# Patient Record
Sex: MALE | Race: WHITE | NOT HISPANIC OR LATINO | Employment: FULL TIME | URBAN - METROPOLITAN AREA
[De-identification: names, ages, dates, MRNs, and addresses within clinical notes are randomized per-mention and may not be internally consistent; named-entity substitution may affect disease eponyms.]

---

## 2017-04-06 ENCOUNTER — ALLSCRIPTS OFFICE VISIT (OUTPATIENT)
Dept: OTHER | Facility: OTHER | Age: 57
End: 2017-04-06

## 2017-04-06 DIAGNOSIS — Z00.00 ENCOUNTER FOR GENERAL ADULT MEDICAL EXAMINATION WITHOUT ABNORMAL FINDINGS: ICD-10-CM

## 2017-04-06 DIAGNOSIS — R53.83 OTHER FATIGUE: ICD-10-CM

## 2017-04-06 DIAGNOSIS — Z12.5 ENCOUNTER FOR SCREENING FOR MALIGNANT NEOPLASM OF PROSTATE: ICD-10-CM

## 2018-01-14 VITALS
DIASTOLIC BLOOD PRESSURE: 80 MMHG | BODY MASS INDEX: 27.99 KG/M2 | OXYGEN SATURATION: 99 % | RESPIRATION RATE: 16 BRPM | HEIGHT: 70 IN | TEMPERATURE: 99.9 F | SYSTOLIC BLOOD PRESSURE: 122 MMHG | WEIGHT: 195.5 LBS | HEART RATE: 100 BPM

## 2018-06-23 ENCOUNTER — TRANSCRIBE ORDERS (OUTPATIENT)
Dept: LAB | Facility: CLINIC | Age: 58
End: 2018-06-23

## 2018-06-26 ENCOUNTER — OFFICE VISIT (OUTPATIENT)
Dept: INTERNAL MEDICINE CLINIC | Facility: CLINIC | Age: 58
End: 2018-06-26
Payer: COMMERCIAL

## 2018-06-26 VITALS
HEART RATE: 79 BPM | BODY MASS INDEX: 27.58 KG/M2 | SYSTOLIC BLOOD PRESSURE: 102 MMHG | TEMPERATURE: 97.9 F | WEIGHT: 197 LBS | OXYGEN SATURATION: 98 % | HEIGHT: 71 IN | DIASTOLIC BLOOD PRESSURE: 70 MMHG

## 2018-06-26 DIAGNOSIS — E78.01 FAMILIAL HYPERCHOLESTEROLEMIA: ICD-10-CM

## 2018-06-26 DIAGNOSIS — R53.83 FATIGUE, UNSPECIFIED TYPE: ICD-10-CM

## 2018-06-26 DIAGNOSIS — Z12.11 COLON CANCER SCREENING: Primary | ICD-10-CM

## 2018-06-26 DIAGNOSIS — Z12.5 PROSTATE CANCER SCREENING: ICD-10-CM

## 2018-06-26 PROBLEM — Z00.00 HEALTHCARE MAINTENANCE: Status: ACTIVE | Noted: 2018-06-26

## 2018-06-26 PROBLEM — R19.09 LEFT GROIN MASS: Status: ACTIVE | Noted: 2018-06-26

## 2018-06-26 PROCEDURE — 3008F BODY MASS INDEX DOCD: CPT | Performed by: INTERNAL MEDICINE

## 2018-06-26 PROCEDURE — 1036F TOBACCO NON-USER: CPT | Performed by: INTERNAL MEDICINE

## 2018-06-26 PROCEDURE — 99212 OFFICE O/P EST SF 10 MIN: CPT | Performed by: INTERNAL MEDICINE

## 2018-06-26 NOTE — ASSESSMENT & PLAN NOTE
Healthcare maintenance  Patient is not up-to-date with any parameters  He has not had cholesterol checked, colon rectal screening, prostate screening and routine labs performed  I did give the patient another slip to have these performed hopefully in the near future and he was told to make an appointment for physical when that is completed  Patient understands and hopefully is agreeable    He states 1 of the limiting factors is his insurance is extremely expensive and he does have a high deductible

## 2018-06-26 NOTE — ASSESSMENT & PLAN NOTE
Left groin mass  Patient is here today for evaluation of swelling to the left groin area  He states he has just noticed this over the past few months  He states the area for is not painful and there is no accident or injury  On evaluation he is found to have some fatty tissue but no solid mass and no evidence of hernia  Patient was reassured that this seems to be benign and that he should call the office if there is any changes  I told the patient he does not need any further workup or evaluation

## 2018-06-26 NOTE — PROGRESS NOTES
Assessment/Plan:      Diagnoses and all orders for this visit:    Colon cancer screening  -     Occult Bloood,Fecal Immunochemical; Future    Familial hypercholesterolemia  -     Comprehensive metabolic panel; Future  -     CBC and differential; Future  -     Lipid panel; Future  -     TSH, 3rd generation with Free T4 reflex; Future    Fatigue, unspecified type  -     Comprehensive metabolic panel; Future  -     CBC and differential; Future  -     Lipid panel; Future  -     TSH, 3rd generation with Free T4 reflex; Future    Prostate cancer screening  -     PSA, Total Screen; Future          Subjective:     Patient ID: Roland Rico is a 65 y o  male  Patient is a 56-year-old male with a history of no major medical problems who is here today for evaluation of the development of the swelling to the left groin area  Patient denies any pain and no tenderness to palpation  He denies any pain with movement or with cough  He has had no accident or injury  Review of Systems   Constitutional: Negative  HENT: Negative  Eyes: Negative  Respiratory: Negative  Cardiovascular: Negative  Gastrointestinal: Negative for abdominal distention, abdominal pain, anal bleeding, blood in stool, constipation, diarrhea, nausea, rectal pain and vomiting  Swelling to the left groin area   Endocrine: Negative  Genitourinary: Negative  Skin: Negative  Allergic/Immunologic: Negative  Neurological: Negative  Hematological: Negative  Psychiatric/Behavioral: Negative  Objective:     Physical Exam   Constitutional: He is oriented to person, place, and time  He appears well-developed and well-nourished  No distress  Pleasant, healthy-appearing slightly anxious 56-year-old male who is awake alert no acute distress   HENT:   Head: Normocephalic and atraumatic     Right Ear: External ear normal    Left Ear: External ear normal    Nose: Nose normal    Mouth/Throat: Oropharynx is clear and moist    Eyes: Conjunctivae and EOM are normal    Neck: Normal range of motion  Neck supple  Cardiovascular: Normal rate, regular rhythm, normal heart sounds and intact distal pulses  Pulmonary/Chest: Effort normal and breath sounds normal    Abdominal: Soft  Bowel sounds are normal  He exhibits no distension and no mass  There is no tenderness  There is no rebound and no guarding  On evaluation today patient has no gross abnormalities evident with inspection to the abdomen  Patient does have some fatty tissue and development in the left lower and right lower abdomen but no tenderness to palpation and no masses were felt  Patient has no evidence of hernia or bulging  Musculoskeletal: Normal range of motion  Neurological: He is alert and oriented to person, place, and time  He has normal reflexes  Skin: Skin is warm and dry  He is not diaphoretic  Erythema: reassured  Psychiatric: His behavior is normal  Judgment and thought content normal    Mildly anxious   Nursing note and vitals reviewed

## 2018-07-17 LAB
ALBUMIN SERPL-MCNC: 4.3 G/DL (ref 3.6–5.1)
ALBUMIN/GLOB SERPL: 2 (CALC) (ref 1–2.5)
ALP SERPL-CCNC: 55 U/L (ref 40–115)
ALT SERPL-CCNC: 20 U/L (ref 9–46)
AST SERPL-CCNC: 18 U/L (ref 10–35)
BASOPHILS # BLD AUTO: 38 CELLS/UL (ref 0–200)
BASOPHILS NFR BLD AUTO: 0.6 %
BILIRUB SERPL-MCNC: 0.3 MG/DL (ref 0.2–1.2)
BUN SERPL-MCNC: 18 MG/DL (ref 7–25)
BUN/CREAT SERPL: NORMAL (CALC) (ref 6–22)
CALCIUM SERPL-MCNC: 9.4 MG/DL (ref 8.6–10.3)
CHLORIDE SERPL-SCNC: 103 MMOL/L (ref 98–110)
CHOLEST SERPL-MCNC: 252 MG/DL
CHOLEST/HDLC SERPL: 5.1 (CALC)
CO2 SERPL-SCNC: 29 MMOL/L (ref 20–31)
CREAT SERPL-MCNC: 0.93 MG/DL (ref 0.7–1.33)
EOSINOPHIL # BLD AUTO: 189 CELLS/UL (ref 15–500)
EOSINOPHIL NFR BLD AUTO: 3 %
ERYTHROCYTE [DISTWIDTH] IN BLOOD BY AUTOMATED COUNT: 12.7 % (ref 11–15)
GLOBULIN SER CALC-MCNC: 2.2 G/DL (CALC) (ref 1.9–3.7)
GLUCOSE SERPL-MCNC: 88 MG/DL (ref 65–99)
HCT VFR BLD AUTO: 43.6 % (ref 38.5–50)
HDLC SERPL-MCNC: 49 MG/DL
HGB BLD-MCNC: 14.4 G/DL (ref 13.2–17.1)
LDLC SERPL CALC-MCNC: 179 MG/DL (CALC)
LYMPHOCYTES # BLD AUTO: 1279 CELLS/UL (ref 850–3900)
LYMPHOCYTES NFR BLD AUTO: 20.3 %
MCH RBC QN AUTO: 29.6 PG (ref 27–33)
MCHC RBC AUTO-ENTMCNC: 33 G/DL (ref 32–36)
MCV RBC AUTO: 89.7 FL (ref 80–100)
MONOCYTES # BLD AUTO: 410 CELLS/UL (ref 200–950)
MONOCYTES NFR BLD AUTO: 6.5 %
NEUTROPHILS # BLD AUTO: 4385 CELLS/UL (ref 1500–7800)
NEUTROPHILS NFR BLD AUTO: 69.6 %
NONHDLC SERPL-MCNC: 203 MG/DL (CALC)
PLATELET # BLD AUTO: 252 THOUSAND/UL (ref 140–400)
PMV BLD REES-ECKER: 10.1 FL (ref 7.5–12.5)
POTASSIUM SERPL-SCNC: 4.2 MMOL/L (ref 3.5–5.3)
PROT SERPL-MCNC: 6.5 G/DL (ref 6.1–8.1)
PSA SERPL-MCNC: 1.5 NG/ML
RBC # BLD AUTO: 4.86 MILLION/UL (ref 4.2–5.8)
SL AMB EGFR AFRICAN AMERICAN: 106 ML/MIN/1.73M2
SL AMB EGFR NON AFRICAN AMERICAN: 91 ML/MIN/1.73M2
SODIUM SERPL-SCNC: 139 MMOL/L (ref 135–146)
TRIGL SERPL-MCNC: 113 MG/DL
TSH SERPL-ACNC: 1.16 MIU/L (ref 0.4–4.5)
WBC # BLD AUTO: 6.3 THOUSAND/UL (ref 3.8–10.8)

## 2018-07-24 ENCOUNTER — TELEPHONE (OUTPATIENT)
Dept: INTERNAL MEDICINE CLINIC | Facility: CLINIC | Age: 58
End: 2018-07-24

## 2018-07-30 DIAGNOSIS — E78.01 FAMILIAL HYPERCHOLESTEROLEMIA: Primary | ICD-10-CM

## 2018-07-30 DIAGNOSIS — R53.83 FATIGUE, UNSPECIFIED TYPE: ICD-10-CM

## 2018-07-30 DIAGNOSIS — G47.30 SLEEP APNEA, UNSPECIFIED TYPE: ICD-10-CM

## 2018-07-30 DIAGNOSIS — R40.0 DAYTIME SLEEPINESS: ICD-10-CM

## 2018-07-30 DIAGNOSIS — R06.83 SNORING: ICD-10-CM

## 2018-07-30 NOTE — PROGRESS NOTES
Discuss the results of recent lab testing with the patient  The only abnormality is his cholesterol profile  Patient admits the fact that he is not watching his diet and he was told he has 6 months but to make some changes with his diet  Most importantly we told him to try to reduce his intake of fats and cholesterol  As an aside patient states he is having problems with fatigue  He states this is variable  He admits the fact that he is having problems with loud snoring and sleeping this a daytime  Patient needs sleep study a performed and we will help to arrange this as an in-home study    Patient is to make an appointment after the sleep study is performed

## 2018-08-14 ENCOUNTER — TELEPHONE (OUTPATIENT)
Dept: SLEEP CENTER | Facility: CLINIC | Age: 58
End: 2018-08-14

## 2018-08-14 NOTE — TELEPHONE ENCOUNTER
----- Message from Odette Godinez MD sent at 8/9/2018  4:03 PM EDT -----  Regarding: RE: sleep study approval  Approved  ----- Message -----  From: Zonia Fraga: 8/9/2018   2:09 PM  To:  Odette Godinez MD  Subject: RE: sleep study approval                         Yes per note patient does snore loudly  ----- Message -----  From: Odette Godinez MD  Sent: 8/9/2018   1:53 PM  To: Jules Lewis  Subject: RE: sleep study approval                         Snoring?  ----- Message -----  From: Jules Lewis  Sent: 8/9/2018  10:01 AM  To: Sleep Medicine THE [facility] [place], #  Subject: sleep study approval                             Please review for approval or denial  Dr Amari Mosquera 7/30 note and order

## 2018-10-12 ENCOUNTER — HOSPITAL ENCOUNTER (OUTPATIENT)
Dept: SLEEP CENTER | Facility: CLINIC | Age: 58
Discharge: HOME | End: 2018-10-12
Payer: COMMERCIAL

## 2018-10-12 DIAGNOSIS — R06.83 SNORING: ICD-10-CM

## 2018-10-12 DIAGNOSIS — G47.30 SLEEP APNEA, UNSPECIFIED TYPE: ICD-10-CM

## 2018-10-12 DIAGNOSIS — R53.83 FATIGUE, UNSPECIFIED TYPE: ICD-10-CM

## 2018-10-12 DIAGNOSIS — R40.0 DAYTIME SLEEPINESS: ICD-10-CM

## 2018-10-12 PROCEDURE — G0399 HOME SLEEP TEST/TYPE 3 PORTA: HCPCS

## 2018-10-13 ENCOUNTER — IMMUNIZATION (OUTPATIENT)
Dept: INTERNAL MEDICINE CLINIC | Facility: CLINIC | Age: 58
End: 2018-10-13
Payer: COMMERCIAL

## 2018-10-13 DIAGNOSIS — Z23 ENCOUNTER FOR IMMUNIZATION: ICD-10-CM

## 2018-10-13 PROCEDURE — 90471 IMMUNIZATION ADMIN: CPT

## 2018-10-13 PROCEDURE — 90682 RIV4 VACC RECOMBINANT DNA IM: CPT

## 2018-10-22 ENCOUNTER — TELEPHONE (OUTPATIENT)
Dept: INTERNAL MEDICINE CLINIC | Facility: CLINIC | Age: 58
End: 2018-10-22

## 2018-10-22 ENCOUNTER — TELEPHONE (OUTPATIENT)
Dept: SLEEP CENTER | Facility: CLINIC | Age: 58
End: 2018-10-22

## 2018-10-22 NOTE — TELEPHONE ENCOUNTER
Patient would like a call back regarding test results  Patient understood you were out but is okay with waiting till you got back

## 2018-10-22 NOTE — TELEPHONE ENCOUNTER
I spoke with pt, advised sleep study did not show any evidence of sleep apnea  Offer CON to discuss with Dr Chakraborty Many  Scheduled 12/5/18 at 830 am in the [place] office

## 2018-12-05 ENCOUNTER — OFFICE VISIT (OUTPATIENT)
Dept: SLEEP CENTER | Facility: CLINIC | Age: 58
End: 2018-12-05
Payer: COMMERCIAL

## 2018-12-05 VITALS
BODY MASS INDEX: 28.7 KG/M2 | HEIGHT: 71 IN | SYSTOLIC BLOOD PRESSURE: 118 MMHG | WEIGHT: 205 LBS | DIASTOLIC BLOOD PRESSURE: 60 MMHG | HEART RATE: 62 BPM

## 2018-12-05 DIAGNOSIS — R06.83 SNORING: Primary | ICD-10-CM

## 2018-12-05 DIAGNOSIS — R09.82 POSTNASAL DRIP: ICD-10-CM

## 2018-12-05 DIAGNOSIS — R45.86 MOOD DISTURBANCE: ICD-10-CM

## 2018-12-05 DIAGNOSIS — E66.3 OVERWEIGHT (BMI 25.0-29.9): ICD-10-CM

## 2018-12-05 DIAGNOSIS — R40.0 DAYTIME SLEEPINESS: ICD-10-CM

## 2018-12-05 DIAGNOSIS — R53.83 FATIGUE, UNSPECIFIED TYPE: ICD-10-CM

## 2018-12-05 PROCEDURE — 99244 OFF/OP CNSLTJ NEW/EST MOD 40: CPT | Performed by: INTERNAL MEDICINE

## 2018-12-05 NOTE — PROGRESS NOTES
Consultation - [facility] 63  Marco  63 y o  male  TQI:7/11/1947  IOI:[id number]    Physician Requesting Consult:[facility],       Reason for Consult : At your kind request I saw this patient for initial sleep evaluation today  A home sleep study was undertaken to evaluate for sleep disordered breathing and   patient is here to review results and further options  The study demonstrated a HERMES (respiratory event index of) 1 4 /hour  Minimum oxygen saturation was 91%  The snore index was 3 6%  PFSH, Problem List, Medications & Allergies were reviewed in EMR  He  has no past medical history on file  He currently has no medications in their medication list       HPI:  Study was undertaken for a complaint of constant fatigue of several years duration that has gotten worse over time  He is tired and drowsy irrespective of sleep  He has been snoring my whole life  Snoring is loud and disturbs his wife to the extent that they have to sleep in separate rooms  Wife has noted at times he appears to be gasping for breath On occasion he has a woken himself with snoring or gasping  Restless Leg Syndrome: reports no suggestive symptoms  Parasomnia activity: no features reported   Sleep Routine: Typical Bedtime: 10:00 p m  Gets OOB:  At 4:30 a m  TIB:6 5 hrs Estimated TST@:  5 hrs  Sleep latency:<  30 minutes Sleep Interruptions: 2-3 x/night because of nocturia and is able to fall back asleep  Awakens: with the aid of an alarm feeling not always refreshed but is tired by around around 3:00 p m  He has Excessive Daytime Sleepiness , feels like napping and naps on weekends when he has the opportunity for 2-3 hours  [place] Sleepiness Scale rated at Total score: 12 /24  Habits: reports that he has never smoked  He has never used smokeless tobacco , reports that he does not drink alcohol ,  reports that he does not use drugs  ,Caffeine use: moderate , Exercise routine: regular until around a month ago because of fatigue         Family History: Negative for sleep disturbance  ROS: reviewed & as attached  Significant for weight has been stable  He has postnasal drip  At times he awakens with palpitations  He reports some feelings of depression and has difficulty with memory and concentration  EXAM:    Vitals /60   Pulse 62   Ht 5' 10 5" (1 791 m)   Wt 93 kg (205 lb)   BMI 29 00 kg/m²     General  Well groomed male, appears stated age, in no apparent distress  Psychiatric  Alert and cooperative  Mental state appears normal  Judgement & Insight  good   Head   Craniofacial anatomy:normal Sinuses: non- tender  TMJ: Normal     Eyes   EOM's intact, conjunctiva/corneas clear         Nasal Airway  is patent Septum:central, Mucous membranes:appear normal     Turbinates:  are normal  There is no rhinorrhea; No PND     Oral   Airway   crowded Tongue:Modified Mallampati class IV (only hard palate visible)  Palate:  redundant soft palateTonsils: no hypertrophy  Teeth: normal       Neck    appears thick; Neck Circumference: 42cm; Supple; no abnormal masses; Thyroid:normal  Trachea:central      Lymph    No Cervical or Submandibular Lymhadenopathy   Heart:    RRR; S1,S2 normal; no gallop; nomurmurs     Lungs   Respiratory Effort:normal  Air entry good bilaterally  No wheezes  No rales   Abdomen   Obese, Soft & non-tender     Extremities   No pedal edema  No clubbing or cyanosis  Skin   Skin is warm and dry; Color& Hydration good; no facial rashes or lesions    Neurologic  Speech is clear and coherent  CNII-XII intact  Rombergs Negative  Muscskeltl    Muscle bulk, tone and power WNL Gait:normal          IMPRESSION: Primary Sleep/Secondary(to Medical or Psych conditions) & comorbidities   1  Snoring     2  Daytime sleepiness     3  Fatigue, unspecified type     4  Postnasal drip     5  Overweight (BMI 25 0-29 9)     6  Mood disturbance        PLAN:   1   I reviewed results of the Sleep study with the patient  2  With respect to above conditions, I counseled on pathophysiology, diagnosis, treatment options, risks and benefits; inter-relationship and effects on symptoms and comorbidities; risks of no treatment; costs and insurance aspects  3  For nasal symptoms I advised regular nasal saline rinse followed by topical nasal steroid if necessary  4  I also advised some weight reduction,allowing sufficient opportunity for sleep and re-commencing his exercise routine  5  If symptoms persist in spite of the above strategies, and in-lab diagnostic study and/or further evaluation for mood disturbance would be warranted  6  Follow-up to be scheduled in 3 months to monitor progress and further options           Sincerely,     Authenticated electronically by Elizabeth Armstrong MD   on 06/88/38   Board Certified Specialist

## 2018-12-05 NOTE — PROGRESS NOTES
Review of Systems      Genitourinary need to urinate more than twice a night   Cardiology none   Gastrointestinal none   Neurology forgetfulness and poor concentration or confusion,    Constitutional fatigue   Integumentary none   Psychiatry depression   Musculoskeletal none   Pulmonary snoring   ENT none   Endocrine frequent urination   Hematological none

## 2019-03-18 ENCOUNTER — OFFICE VISIT (OUTPATIENT)
Dept: SLEEP CENTER | Facility: CLINIC | Age: 59
End: 2019-03-18
Payer: COMMERCIAL

## 2019-03-18 VITALS
DIASTOLIC BLOOD PRESSURE: 80 MMHG | SYSTOLIC BLOOD PRESSURE: 132 MMHG | BODY MASS INDEX: 30.06 KG/M2 | HEART RATE: 70 BPM | WEIGHT: 210 LBS | HEIGHT: 70 IN

## 2019-03-18 DIAGNOSIS — R40.0 DAYTIME SLEEPINESS: ICD-10-CM

## 2019-03-18 DIAGNOSIS — R53.83 FATIGUE, UNSPECIFIED TYPE: ICD-10-CM

## 2019-03-18 DIAGNOSIS — E66.3 OVERWEIGHT (BMI 25.0-29.9): ICD-10-CM

## 2019-03-18 DIAGNOSIS — F51.12 INSUFFICIENT SLEEP SYNDROME: ICD-10-CM

## 2019-03-18 DIAGNOSIS — R09.82 POSTNASAL DRIP: ICD-10-CM

## 2019-03-18 DIAGNOSIS — R45.86 MOOD DISTURBANCE: ICD-10-CM

## 2019-03-18 DIAGNOSIS — R06.83 SNORING: Primary | ICD-10-CM

## 2019-03-18 PROCEDURE — 99214 OFFICE O/P EST MOD 30 MIN: CPT | Performed by: INTERNAL MEDICINE

## 2019-03-18 NOTE — PROGRESS NOTES
Review of Systems      Genitourinary need to urinate more than twice a night   Cardiology none   Gastrointestinal none   Neurology none   Constitutional fatigue   Integumentary none   Psychiatry none   Musculoskeletal none   Pulmonary snoring   ENT none   Endocrine frequent urination   Hematological none

## 2019-03-18 NOTE — PROGRESS NOTES
Follow-Up Note - Sleep Center   Pablo Bolivar  63 y o  male  XUB:[birth year]  CMQ:[id number]    CC: I saw this patient for follow-up in clinic today for Sleep and Medical Problems  PFSH, Problem List, Medications & Allergies were reviewed in EMR  Interval changes: none reported  He  has no past medical history on file  He currently has no medications in their medication list     ROS: reviewed (see attached)  Significant for he continues to report nasal symptoms that persists in spite of regular nasal states saline rinse and topical steroid  He also notes clicking in his ear  In the past few months, he reports less feelings of anxiety and depression  HPI:  He has ongoing snoring that is loud and disturbs his wife  She also notes he appears to be gasping for breath  He is not aware of breathing difficulties during sleep but has frequent interruptions  Sleep Routine: He reports getting 6 hours sleep and attributes this to his work schedule ; he has no difficulty initiating or maintaining sleep   He awakens spontaneously feeling is not always refreshed  He tired as the day progresses and has excessive drowsiness  He rated himself at Total score: 12 /24 on the [place] sleepiness scale  He feels like napping and does so on weekends when he has the opportunity  Habits: reports that he has never smoked  He has never used smokeless tobacco ,  reports that he does not drink alcohol ,  reports that he does not use drugs  , Caffeine use: limited  , Exercise routine: regular    EXAM: /80   Pulse 70   Ht 5' 10" (1 778 m)   Wt 95 3 kg (210 lb)   BMI 30 13 kg/m²     Patient is well groomed; well appearing  Skin/Extrem: warm & dry; col & hydration normal; no edema  Psych: cooperativeand in no distress  Mental state appears normal   CNS: Alert, orientated, clear & coherent speech  H&N: EOMI; NC/AT:no facial pressure marks, no rashes  Neck Circumference: 42 cm     ENMT Mucus membranes appear normal Nasal airway:patent  Oral airway:  crowded  Resp:effort is normal CVS: RRR ABD:truncal obesity MSK:Gait normal     IMPRESSION: Primary Sleep/Secondary(to Medical or Psych conditions) & comorbidities   1  Snoring     2  Daytime sleepiness     3  Insufficient sleep syndrome     4  Postnasal drip     5  Overweight (BMI 25 0-29 9)     6  Fatigue, unspecified type     7  Mood disturbance         PLAN:  1  I reviewed results of the Sleep study with the patient  2  With respect to above conditions, I    counseled on pathophysiology, diagnosis, treatment options, risks and benefits; inter-relationship and effects on symptoms and comorbidities; risks of no treatment; costs and insurance aspects  3  For his Nasal symptoms I advised continuing regular nasal saline rinse followed by topical nasal steroid if necessary  I also advised ENT evaluation  4  The alternate would be positive airway pressure therapy  To justify use, a repeat diagnostic study would be warranted and in view of the negative home sleep study, he will need an in-lab diagnostic study  5  Recommended weight reduction,  positional therapy and allowing sufficient opportunity for sleep  6  He wanted to consider his options: follow up with ENT, the dental appliance together with the other strategies as outlined above or repeat an in-lab diagnostic study  He will call for follow-up as needed      Sincerely,    Authenticated electronically by Norris Pedroza MD on 12/39/51   Board Certified Specialist

## 2019-12-03 ENCOUNTER — TELEPHONE (OUTPATIENT)
Dept: INTERNAL MEDICINE CLINIC | Facility: CLINIC | Age: 59
End: 2019-12-03

## 2019-12-03 NOTE — TELEPHONE ENCOUNTER
30+ years ago he had lower back pain   Mri showed a herniated disc  He had an epidural and everything was fine  The last month or so he has been experiencing the same pain  He decided to call a neuro surgeon to discuss possible surgery  They will not see him until he has an Mri and he needs you to order it  He was referred to Dr Jhonathan Ferrell at [facility] AT THE [facility] but he also was told about Dr Ciara Murphy at [facility]  Do you have a preference? If ok with you, please place order for Mri and I will send it to him

## 2019-12-05 ENCOUNTER — OFFICE VISIT (OUTPATIENT)
Dept: INTERNAL MEDICINE CLINIC | Facility: CLINIC | Age: 59
End: 2019-12-05
Payer: COMMERCIAL

## 2019-12-05 VITALS
OXYGEN SATURATION: 95 % | SYSTOLIC BLOOD PRESSURE: 118 MMHG | TEMPERATURE: 97.8 F | HEART RATE: 77 BPM | HEIGHT: 70 IN | DIASTOLIC BLOOD PRESSURE: 64 MMHG | BODY MASS INDEX: 29.46 KG/M2 | WEIGHT: 205.8 LBS

## 2019-12-05 DIAGNOSIS — Z00.00 HEALTHCARE MAINTENANCE: ICD-10-CM

## 2019-12-05 DIAGNOSIS — M54.50 CHRONIC BILATERAL LOW BACK PAIN WITHOUT SCIATICA: Primary | ICD-10-CM

## 2019-12-05 DIAGNOSIS — G89.29 CHRONIC BILATERAL LOW BACK PAIN WITHOUT SCIATICA: Primary | ICD-10-CM

## 2019-12-05 PROCEDURE — 99214 OFFICE O/P EST MOD 30 MIN: CPT | Performed by: INTERNAL MEDICINE

## 2019-12-05 RX ORDER — METHYLPREDNISOLONE 4 MG/1
TABLET ORAL
Qty: 21 EACH | Refills: 0 | Status: SHIPPED | OUTPATIENT
Start: 2019-12-05 | End: 2019-12-20 | Stop reason: ALTCHOICE

## 2019-12-05 NOTE — PATIENT INSTRUCTIONS
Calorie Counting Diet   WHAT YOU NEED TO KNOW:   What is a calorie counting diet? It is a meal plan based on counting calories each day to reach a healthy body weight  You will need to eat fewer calories if you are trying to lose weight  Weight loss may decrease your risk for certain health problems or improve your health if you have health problems  Some of these health problems include heart disease, high blood pressure, and diabetes  What foods should I avoid? Your dietitian will tell you if you need to avoid certain foods based on your body weight and health condition  You may need to avoid high-fat foods if you are at risk for or have heart disease  You may need to eat fewer foods from the breads and starches food group if you have diabetes  How many calories are in foods? The following is a list of foods and drinks with the approximate number of calories in each  Check the food label to find the exact number of calories  A dietitian can tell you how many calories you should have from each food group each day    · Carbohydrate:      ¨ ½ of a 3-inch bagel, 1 slice of bread, or ½ of a hamburger bun or hot dog bun (80)    ¨ 1 (8-inch) flour tortilla or ½ cup of cooked rice (100)    ¨ 1 (6-inch) corn tortilla (80)    ¨ 1 (6-inch) pancake or 1 cup of bran flakes cereal (110)    ¨ ½ cup of cooked cereal (80)    ¨ ½ cup of cooked pasta (85)    ¨ 1 ounce of pretzels (100)    ¨ 3 cups of air-popped popcorn without butter or oil (80)    · Dairy:      ¨ 1 cup of skim or 1% milk (90)    ¨ 1 cup of 2% milk (120)    ¨ 1 cup of whole milk (160)    ¨ 1 cup of 2% chocolate milk (220)    ¨ 1 ounce of low-fat cheese with 3 grams of fat per ounce (70)    ¨ 1 ounce of cheddar cheese (114)    ¨ ½ cup of 1% fat cottage cheese (80)    ¨ 1 cup of plain or sugar-free, fat-free yogurt (90)    · Protein foods:      ¨ 3 ounces of fish (not breaded or fried) (95)    ¨ 3 ounces of breaded, fried fish (195)    ¨ ¾ cup of tuna canned in water (105)    ¨ 3 ounces of chicken breast without skin (105)    ¨ 1 fried chicken breast with skin (350)    ¨ ¼ cup of fat free egg substitute (40)    ¨ 1 large egg (75)    ¨ 3 ounces of lean beef or pork (165)    ¨ 3 ounces of fried pork chop or ham (185)    ¨ ½ cup of cooked dried beans, such as kidney, salcido, lentils, or navy (115)    ¨ 3 ounces of bologna or lunch meat (225)    ¨ 2 links of breakfast sausage (140)    · Vegetables:      ¨ ½ cup of sliced mushrooms (10)    ¨ 1 cup of salad greens, such as lettuce, spinach, or alma (15)    ¨ ½ cup of steamed asparagus (20)    ¨ ½ cup of cooked summer squash, zucchini squash, or green or wax beans (25)    ¨ 1 cup of broccoli or cauliflower florets, or 1 medium tomato (25)    ¨ 1 large raw carrot or ½ cup of cooked carrots (40)    ¨ ? of a medium cucumber or 1 stalk of celery (5)    ¨ 1 small baked potato (160)    ¨ 1 cup of breaded, fried vegetables (230)    · Fruit:      ¨ 1 (6-inch) banana (55)     ¨ ½ of a 4-inch grapefruit (55)    ¨ 15 grapes (60)    ¨ 1 medium orange or apple (70)    ¨ 1 large peach (65)    ¨ 1 cup of fresh pineapple chunks (75)    ¨ 1 cup of melon cubes (50)    ¨ 1¼ cups of whole strawberries (45)    ¨ ½ cup of fruit canned in juice (55)    ¨ ½ cup of fruit canned in heavy syrup (110)    ¨ ?  cup of raisins (130)    ¨ ½ cup of unsweetened fruit juice (60)    ¨ ½ cup of grape, cranberry, or prune juice (90)    · Fat:      ¨ 10 peanuts or 2 teaspoons of peanut butter (55)    ¨ 2 tablespoons of avocado or 1 tablespoon of regular salad dressing (45)    ¨ 2 slices of bejarano (90)    ¨ 1 teaspoon of oil, such as safflower, canola, corn, or olive oil (45)    ¨ 2 teaspoons of low-fat margarine, or 1 tablespoon of low-fat mayonnaise (50)    ¨ 1 teaspoon of regular margarine (40)    ¨ 1 tablespoon of regular mayonnaise (135)    ¨ 1 tablespoon of cream cheese or 2 tablespoons of low-fat cream cheese (45)    ¨ 2 tablespoons of vegetable shortening (215)    · Dessert and sweets:      ¨ 8 animal crackers or 5 vanilla wafers (80)    ¨ 1 frozen fruit juice bar (80)    ¨ ½ cup of ice milk or low-fat frozen yogurt (90)    ¨ ½ cup of sherbet or sorbet (125)    ¨ ½ cup of sugar-free pudding or custard (60)    ¨ ½ cup of ice cream (140)    ¨ ½ cup of pudding or custard (175)    ¨ 1 (2-inch) square chocolate brownie (185)    · Combination foods:      ¨ Bean burrito made with an 8-inch tortilla, without cheese (275)    ¨ Chicken breast sandwich with lettuce and tomato (325)    ¨ 1 cup of chicken noodle soup (60)    ¨ 1 beef taco (175)    ¨ Regular hamburger with lettuce and tomato (310)    ¨ Regular cheeseburger with lettuce and tomato (410)     ¨ ¼ of a 12-inch cheese pizza (280)    ¨ Fried fish sandwich with lettuce and tomato (425)    ¨ Hot dog and bun (275)    ¨ 1½ cups of macaroni and cheese (310)    ¨ Taco salad with a fried tortilla shell (870)    · Low-calorie foods:      ¨ 1 tablespoon of ketchup or 1 tablespoon of fat free sour cream (15)    ¨ 1 teaspoon of mustard (5)    ¨ ¼ cup of salsa (20)    ¨ 1 large dill pickle (15)    ¨ 1 tablespoon of fat free salad dressing (10)    ¨ 2 teaspoons of low-sugar, light jam or jelly, or 1 tablespoon of sugar-free syrup (15)    ¨ 1 sugar-free popsicle (15)    ¨ 1 cup of club soda, seltzer water, or diet soda (0)  CARE AGREEMENT:   You have the right to help plan your care  Discuss treatment options with your caregivers to decide what care you want to receive  You always have the right to refuse treatment  The above information is an  only  It is not intended as medical advice for individual conditions or treatments  Talk to your doctor, nurse or pharmacist before following any medical regimen to see if it is safe and effective for you  © 2017 [employer] Information is for End User's use only and may not be sold, redistributed or otherwise used for commercial purposes   All illustrations and images included in CareNotes® are the copyrighted property of [employer]  or [employer]

## 2019-12-05 NOTE — PROGRESS NOTES
Assessment/Plan:    Chronic bilateral low back pain without sciatica  Patient is here today complaining of low back pain  Has a history of herniated disc and he is unsure which side in the past   Patient states no accident or injury and the discomfort is chronic  No weakness in the arms or legs no change in urination bowel habits  Patient on examination had no discomfort with maneuvers  Negative straight leg raising no weakness in lower extremities  Patient was given a diagnosis of lumbago without sciatica  Placed on Medrol Dosepak and was told if worsening of symptoms before next visit he needs to be seen immediately  If any change in bowel or bladder habits, evidence of weakness in lower extremities again immediate evaluation  He will call the office on Monday with an update and see us in the office in 1 week  Healthcare maintenance  Patient is not been seen in greater than 1 year  Patient is due for routine lab testing and this will be given to the patient with his next visit  Patient then is to make an appointment for routine physical    Overweight (BMI 25 0-29  9)  With the patient's BMI he is considered overweight  Patient has had no significant weight loss since his last visit  We did discuss again with the patient the importance of diet and exercise  Diagnoses and all orders for this visit:    Chronic bilateral low back pain without sciatica  -     methylPREDNISolone 4 MG tablet therapy pack; Use as directed on package    Healthcare maintenance          Subjective:      Patient ID: Marcie Chino is a 63 y o  male  Patient is here today for evaluation  Complaining of acute and chronic low back pain over the past few months  Patient states his symptoms have been getting worse and they usually wax and wane  Relates that he did have a injury to his back when he was 23years old, possible herniated disc and was treated with epidural injections which were successful    The patient denies any weakness in the lower extremities  No change in bowel or bladder habits  No history of recent injury      The following portions of the patient's history were reviewed and updated as appropriate: He  has no past medical history on file  He   Patient Active Problem List    Diagnosis Date Noted    Chronic bilateral low back pain without sciatica 12/05/2019    Fatigue 12/05/2018    Overweight (BMI 25 0-29 9) 12/05/2018    Postnasal drip 12/05/2018    Mood disturbance 12/05/2018    KEVIN (obstructive sleep apnea)     Snoring     Daytime sleepiness     Left groin mass 06/26/2018    Healthcare maintenance 06/26/2018     He  has no past surgical history on file  His family history is not on file  He  reports that he has never smoked  He has never used smokeless tobacco  He reports that he does not drink alcohol or use drugs  Current Outpatient Medications   Medication Sig Dispense Refill    methylPREDNISolone 4 MG tablet therapy pack Use as directed on package 21 each 0     No current facility-administered medications for this visit  No current outpatient medications on file prior to visit  No current facility-administered medications on file prior to visit  He has No Known Allergies       Review of Systems   Constitutional: Positive for activity change (States that he has had to limit his activity level somewhat secondary to his discomfort) and fatigue ( does have a history of some fatigue, untreated obstructive sleep apnea)  Negative for appetite change, chills, diaphoresis, fever and unexpected weight change  HENT: Negative  Eyes: Negative  Respiratory: Negative  Cardiovascular: Negative  Gastrointestinal: Negative  Endocrine: Negative  Genitourinary: Negative  Musculoskeletal: Positive for back pain  Negative for arthralgias, gait problem, joint swelling, myalgias, neck pain and neck stiffness  Skin: Negative  Allergic/Immunologic: Negative      Neurological: Negative  Hematological: Negative  Psychiatric/Behavioral: Negative  Objective:      /64   Pulse 77   Temp 97 8 °F (36 6 °C)   Ht 5' 10" (1 778 m)   Wt 93 4 kg (205 lb 12 8 oz)   SpO2 95%   BMI 29 53 kg/m²          Physical Exam   Constitutional: He is oriented to person, place, and time  He appears well-developed and well-nourished  No distress  Pleasant, mildly overweight 58ear-old male who is awake alert,   HENT:   Head: Normocephalic and atraumatic  Right Ear: External ear normal    Left Ear: External ear normal    Nose: Nose normal    Mouth/Throat: Oropharynx is clear and moist  No oropharyngeal exudate  Eyes: Pupils are equal, round, and reactive to light  Conjunctivae and EOM are normal    Neck: Normal range of motion  Neck supple  Cardiovascular: Normal rate, regular rhythm, normal heart sounds and intact distal pulses  Pulmonary/Chest: Effort normal and breath sounds normal    Abdominal: Soft  Bowel sounds are normal  He exhibits no distension and no mass  There is no tenderness  There is no rebound and no guarding  No hernia  Overweight   Musculoskeletal: Normal range of motion  He exhibits deformity  He exhibits no edema or tenderness  On evaluation the patient spine patient does have some flattening to his lumbar curve but no other gross abnormalities  Patient has no tenderness to palpation and no paravertebral muscle spasm or noted on exam   With maneuvers patient has no pain with rotation to out lumbar spine no discomfort with straight leg raising no weakness to lower extremities  Peripheral pulses are intact  Neurological: He is alert and oriented to person, place, and time  He displays normal reflexes  No cranial nerve deficit or sensory deficit  He exhibits normal muscle tone  Coordination normal    Skin: Skin is warm and dry  No rash noted  He is not diaphoretic  No erythema  No pallor  Psychiatric: He has a normal mood and affect   His behavior is normal  Judgment and thought content normal    Nursing note and vitals reviewed  BMI Counseling: Body mass index is 29 53 kg/m²  The BMI is above normal  Nutrition recommendations include reducing portion sizes, decreasing overall calorie intake, 3-5 servings of fruits/vegetables daily, consuming healthier snacks, increasing intake of lean protein, reducing intake of saturated fat and trans fat and reducing intake of cholesterol  Exercise recommendations include moderate aerobic physical activity for 150 minutes/week

## 2019-12-05 NOTE — ASSESSMENT & PLAN NOTE
With the patient's BMI he is considered overweight  Patient has had no significant weight loss since his last visit  We did discuss again with the patient the importance of diet and exercise

## 2019-12-05 NOTE — ASSESSMENT & PLAN NOTE
Patient is here today complaining of low back pain  Has a history of herniated disc and he is unsure which side in the past   Patient states no accident or injury and the discomfort is chronic  No weakness in the arms or legs no change in urination bowel habits  Patient on examination had no discomfort with maneuvers  Negative straight leg raising no weakness in lower extremities  Patient was given a diagnosis of lumbago without sciatica  Placed on Medrol Dosepak and was told if worsening of symptoms before next visit he needs to be seen immediately  If any change in bowel or bladder habits, evidence of weakness in lower extremities again immediate evaluation  He will call the office on Monday with an update and see us in the office in 1 week

## 2019-12-05 NOTE — ASSESSMENT & PLAN NOTE
Patient is not been seen in greater than 1 year  Patient is due for routine lab testing and this will be given to the patient with his next visit    Patient then is to make an appointment for routine physical

## 2019-12-09 ENCOUNTER — TELEPHONE (OUTPATIENT)
Dept: INTERNAL MEDICINE CLINIC | Facility: CLINIC | Age: 59
End: 2019-12-09

## 2019-12-09 NOTE — TELEPHONE ENCOUNTER
Patient called to update you  Patient is doing better  Still has pain but has improved and is tolerating medication  Patient said you can call him to go farther into detail about his improvements

## 2019-12-10 ENCOUNTER — TELEPHONE (OUTPATIENT)
Dept: INTERNAL MEDICINE CLINIC | Facility: CLINIC | Age: 59
End: 2019-12-10

## 2019-12-10 NOTE — TELEPHONE ENCOUNTER
Pt would like a call back to discuss how he is feeling per Dr Valery St  He can be reached at [phone]

## 2019-12-10 NOTE — TELEPHONE ENCOUNTER
Patient is feeling improved but not 100%  He has not finished the Medrol Dosepak as of yet  He does have a follow-up appointment to be seen in approximately a week and half in the office    He was told if worsening of symptoms after he has finished the steroids would consider starting physical therapy program

## 2019-12-20 ENCOUNTER — OFFICE VISIT (OUTPATIENT)
Dept: INTERNAL MEDICINE CLINIC | Facility: CLINIC | Age: 59
End: 2019-12-20
Payer: COMMERCIAL

## 2019-12-20 VITALS
TEMPERATURE: 97.7 F | HEIGHT: 70 IN | BODY MASS INDEX: 29.06 KG/M2 | HEART RATE: 83 BPM | DIASTOLIC BLOOD PRESSURE: 72 MMHG | WEIGHT: 203 LBS | SYSTOLIC BLOOD PRESSURE: 110 MMHG | OXYGEN SATURATION: 98 %

## 2019-12-20 DIAGNOSIS — M54.50 CHRONIC BILATERAL LOW BACK PAIN WITHOUT SCIATICA: Primary | ICD-10-CM

## 2019-12-20 DIAGNOSIS — G89.29 CHRONIC BILATERAL LOW BACK PAIN WITHOUT SCIATICA: Primary | ICD-10-CM

## 2019-12-20 PROCEDURE — 99213 OFFICE O/P EST LOW 20 MIN: CPT | Performed by: INTERNAL MEDICINE

## 2019-12-20 NOTE — ASSESSMENT & PLAN NOTE
Patient is here today for re-evaluation  With the patient having problems with low back pain bilaterally without sciatica we did start conservative treatment with placing the patient on a Medrol Dosepak which she states has helped somewhat but not 100% he is feeling much more comfortable  On exam today patient only has minimal discomfort with maneuvers today, no decreased range of motion, no weakness to lower extremities, denies any bowel or bladder dysfunction  Because of his chronic low back problems we did initiate consult and evaluation by physical therapy which we know will be helpful for the patient in the near future  If these conservative measures do not work with than consider evaluation by Spine and Pain Management  Patient understands and agrees    Be seen in the office in approximately 4-6 weeks and knows to call if worsening symptoms or difficulty with physical therapy

## 2019-12-20 NOTE — PROGRESS NOTES
Assessment/Plan:    Chronic bilateral low back pain without sciatica  Patient is here today for re-evaluation  With the patient having problems with low back pain bilaterally without sciatica we did start conservative treatment with placing the patient on a Medrol Dosepak which she states has helped somewhat but not 100% he is feeling much more comfortable  On exam today patient only has minimal discomfort with maneuvers today, no decreased range of motion, no weakness to lower extremities, denies any bowel or bladder dysfunction  Because of his chronic low back problems we did initiate consult and evaluation by physical therapy which we know will be helpful for the patient in the near future  If these conservative measures do not work with than consider evaluation by Spine and Pain Management  Patient understands and agrees  Be seen in the office in approximately 4-6 weeks and knows to call if worsening symptoms or difficulty with physical therapy       Diagnoses and all orders for this visit:    Chronic bilateral low back pain without sciatica  -     Ambulatory referral to Physical Therapy; Future          Subjective:      Patient ID: Ritika Anguiano is a 63 y o  male  Patient is a 62-year-old male with medical problems as outlined previously  Patient had recent acute injury to his low back in the midline with some problems with pain and discomfort  He relates that he did have an injury in the distant past to low back and had received therapeutic injections with epidural steroids which is been helpful  Patient was evaluated in the office and we did start conservative treatment with placing the patient on a Medrol Dosepak  He is here today for re-evaluation stating that he has improved but not 100%  No new symptoms no red flags      The following portions of the patient's history were reviewed and updated as appropriate: He  has no past medical history on file    He   Patient Active Problem List Diagnosis Date Noted    Chronic bilateral low back pain without sciatica 12/05/2019    Fatigue 12/05/2018    Overweight (BMI 25 0-29 9) 12/05/2018    Postnasal drip 12/05/2018    Mood disturbance 12/05/2018    KEVIN (obstructive sleep apnea)     Snoring     Daytime sleepiness     Left groin mass 06/26/2018    Healthcare maintenance 06/26/2018     He  has no past surgical history on file  His family history is not on file  He  reports that he has never smoked  He has never used smokeless tobacco  He reports that he does not drink alcohol or use drugs  No current outpatient medications on file  No current facility-administered medications for this visit  Current Outpatient Medications on File Prior to Visit   Medication Sig    [DISCONTINUED] methylPREDNISolone 4 MG tablet therapy pack Use as directed on package     No current facility-administered medications on file prior to visit  He has No Known Allergies       Review of Systems   Constitutional: Positive for activity change (States has slowly increased his activity level)  Negative for appetite change, chills, diaphoresis, fatigue, fever and unexpected weight change  HENT: Negative  Eyes: Negative  Respiratory: Negative  Cardiovascular: Negative  Genitourinary: Negative  Musculoskeletal: Positive for back pain  Negative for arthralgias, gait problem, joint swelling, myalgias, neck pain and neck stiffness  Skin: Negative  Allergic/Immunologic: Negative  Neurological: Negative  Hematological: Negative  Psychiatric/Behavioral: Negative  Objective:      /72   Pulse 83   Temp 97 7 °F (36 5 °C)   Ht 5' 10" (1 778 m)   Wt 92 1 kg (203 lb)   SpO2 98%   BMI 29 13 kg/m²          Physical Exam   Constitutional: He is oriented to person, place, and time  He appears well-developed and well-nourished  No distress     Pleasant, cheerful 55-year-old male who is awake alert no acute distress and oriented x3   HENT:   Head: Normocephalic and atraumatic  Right Ear: External ear normal    Left Ear: External ear normal    Nose: Nose normal    Mouth/Throat: Oropharynx is clear and moist  No oropharyngeal exudate  Eyes: Pupils are equal, round, and reactive to light  Conjunctivae and EOM are normal    Neck: Normal range of motion  Neck supple  Cardiovascular: Normal rate, regular rhythm, normal heart sounds and intact distal pulses  Pulmonary/Chest: Effort normal and breath sounds normal    Abdominal: Soft  Bowel sounds are normal    Musculoskeletal: He exhibits deformity  He exhibits no edema or tenderness  Patient does have very slight decreased range of motion both actively and passively to lumbar spine with maneuvers today  Some slight discomfort with forward, backward bending and rotation both the left and right  Negative straight leg raising care  No weakness to lower extremities  Neurological: He is alert and oriented to person, place, and time  He displays normal reflexes  No cranial nerve deficit or sensory deficit  He exhibits normal muscle tone  Coordination normal    Skin: Skin is warm and dry  Capillary refill takes less than 2 seconds  He is not diaphoretic  Psychiatric: He has a normal mood and affect  His behavior is normal  Judgment and thought content normal    Nursing note and vitals reviewed

## 2020-01-03 ENCOUNTER — TELEPHONE (OUTPATIENT)
Dept: INTERNAL MEDICINE CLINIC | Facility: CLINIC | Age: 60
End: 2020-01-03

## 2020-01-03 DIAGNOSIS — G89.29 CHRONIC BILATERAL LOW BACK PAIN WITHOUT SCIATICA: Primary | ICD-10-CM

## 2020-01-03 DIAGNOSIS — M54.50 CHRONIC BILATERAL LOW BACK PAIN WITHOUT SCIATICA: Primary | ICD-10-CM

## 2020-01-03 NOTE — TELEPHONE ENCOUNTER
Pt checked with his insurance company as per Dr Townsend's request and found out that his insurance company covers 80% of PT charges  Pt is requesting a physician to physician referral for PT  Pt requests a call back at [phone] once order has been generated  Thank you!

## 2020-01-14 ENCOUNTER — EVALUATION (OUTPATIENT)
Dept: PHYSICAL THERAPY | Facility: CLINIC | Age: 60
End: 2020-01-14
Payer: COMMERCIAL

## 2020-01-14 DIAGNOSIS — G89.29 CHRONIC BILATERAL LOW BACK PAIN WITHOUT SCIATICA: Primary | ICD-10-CM

## 2020-01-14 DIAGNOSIS — M54.50 CHRONIC BILATERAL LOW BACK PAIN WITHOUT SCIATICA: Primary | ICD-10-CM

## 2020-01-14 PROCEDURE — 97161 PT EVAL LOW COMPLEX 20 MIN: CPT | Performed by: PHYSICAL THERAPIST

## 2020-01-14 NOTE — PROGRESS NOTES
PT Evaluation     Today's date: 2020  Patient name: Robina Mason  : 1960  MRN: [id number]  Referring provider: Maryln Hamman, DO  Dx:   Encounter Diagnosis     ICD-10-CM    1  Chronic bilateral low back pain without sciatica M54 5 Ambulatory referral to Physical Therapy    G89 [id number]                   Assessment  Assessment details: The patient presents with s/s consistent with chronic LBP with a stabilization preference and possible piriformis syndrome  The patient demonstrates impairments including limited lumbar ROM, mild hypomobility in the lumbar vertebrae, moderate core weakness, and pain with ADLs  The patient would benefit from skilled PT to address his deficits and meet his goals  Impairments: abnormal muscle tone, abnormal or restricted ROM, impaired physical strength and pain with function  Understanding of Dx/Px/POC: good   Prognosis: good    Goals  STG: To be completed in 4 weeks  1  The patient will report no more than 5/10 pain during all adls  2  The patient is compliant with his HEP  3  The patient will increase core/LE strength to 4+/5 MMT when ascending/descending stairs  LTG: To be completed in 8 weeks    1  The patient will report no more than 1/10 pain during all adls  2  The patient will increase core strength to 5/5 MMT when ambulating more than 30 minutes         Plan  Patient would benefit from: skilled physical therapy  Planned modality interventions: low level laser therapy  Planned therapy interventions: joint mobilization, manual therapy, abdominal trunk stabilization, neuromuscular re-education, patient education, postural training, self care, strengthening, stretching and home exercise program  Frequency: 2x week  Duration in visits: 16  Plan of Care beginning date: 2020  Treatment plan discussed with: patient        Subjective Evaluation    History of Present Illness  Date of onset: 10/27/2019  Mechanism of injury: Robina Mason is a 63 y o  male who presents with chronic LBP that recently started radiating down the right posterior thigh occassionly  The patient reports mild occasional parasthesias but denies trouble sleeping at night  Patient does have a hx of herniated discs 30 years ago  He underwent therapy and several epidurals before his symptoms subsided  The patient is a  but does not do heavy lifting  The patient currently struggles with running and bending over  PMH includes KEVIN and overweight  Recurrent probem    Pain  Current pain ratin  At best pain ratin  At worst pain ratin  Quality: throbbing and radiating  Relieving factors: rest and medications  Aggravating factors: running  Progression: improved    Social Support    Employment status: working ()    Diagnostic Tests  Abnormal MRI: MRI from 30 years ago showed herniated disc improved with PT and injections    Treatments  Previous treatment: physical therapy and injection treatment  Current treatment: physical therapy  Patient Goals  Patient goals for therapy: decreased pain          Objective     Neurological Testing     Sensation     Lumbar   Left   Intact: light touch    Right   Intact: light touch    Active Range of Motion     Lumbar   Extension: 10 degrees  Restriction level: minimal    Additional Active Range of Motion Details  Lumbar:   Flexion- fingertips 5 cm from the floor (pain noted when returning to neutral)  R Lateral flexion- fingertips 50 cm from the floor*  L Lateral Flexion- fingertips 55 cm from floor*    *pain noted    Joint Play   Joints within functional limits: T12, L1, L2 and L3     Hypomobile: L4, L5 and S1     Pain: L4 and L5     Strength/Myotome Testing     Left Hip   Planes of Motion   Flexion: 4  Abduction: 4  Adduction: 4    Right Hip   Planes of Motion   Flexion: 4  Abduction: 4  Adduction: 4    Left Knee   Flexion: 4+  Extension: 4+    Right Knee   Flexion: 4+  Extension: 4+    Left Ankle/Foot Dorsiflexion: 4+  Plantar flexion: 4    Right Ankle/Foot   Dorsiflexion: 4+  Plantar flexion: 4    Muscle Activation     Additional Muscle Activation Details  Abd: 4/5 MMT  Bridge 40 seconds    Tests     Lumbar     Left   Negative crossed SLR, passive SLR and slump test      Right   Negative crossed SLR, passive SLR and slump test      Left Hip   Negative YUNG and FADIR  Right Hip   Negative YUNG and FADIR                Precautions: KEVIN, overweight      Manual  1/14            Laser: Lumbar             IASTM: R piriformis                                                        Exercise Diary  1/14            TA Set 5"x10            TA Set c Marches             Bridges 2x10            R Piriformis St   20"x4            R SKC 20'x3            Seated Sciatic N [place]             Ricardo             L LTR                                                                                                                                                                             Modalities

## 2020-01-23 ENCOUNTER — APPOINTMENT (OUTPATIENT)
Dept: PHYSICAL THERAPY | Facility: CLINIC | Age: 60
End: 2020-01-23
Payer: COMMERCIAL

## 2020-01-24 ENCOUNTER — ANESTHESIA EVENT (OUTPATIENT)
Dept: GASTROENTEROLOGY | Facility: AMBULARY SURGERY CENTER | Age: 60
End: 2020-01-24

## 2020-01-30 ENCOUNTER — OFFICE VISIT (OUTPATIENT)
Dept: PHYSICAL THERAPY | Facility: CLINIC | Age: 60
End: 2020-01-30
Payer: COMMERCIAL

## 2020-01-30 DIAGNOSIS — G89.29 CHRONIC BILATERAL LOW BACK PAIN WITHOUT SCIATICA: Primary | ICD-10-CM

## 2020-01-30 DIAGNOSIS — M54.50 CHRONIC BILATERAL LOW BACK PAIN WITHOUT SCIATICA: Primary | ICD-10-CM

## 2020-01-30 PROCEDURE — 97110 THERAPEUTIC EXERCISES: CPT | Performed by: PHYSICAL THERAPIST

## 2020-01-30 PROCEDURE — 97112 NEUROMUSCULAR REEDUCATION: CPT | Performed by: PHYSICAL THERAPIST

## 2020-01-30 NOTE — PROGRESS NOTES
Daily Note     Today's date: 2020  Patient name: Berenice Gibson  : 1960  MRN: [id number]  Referring provider: Jose Lucero DO  Dx: No diagnosis found  Subjective: The patient returns after IE noting fair compliance with his HEP and some improvement in LBP  Objective: See treatment diary below      Assessment: The patient demonstrated good tolerance to exercises  Good tolerance to laser  Plan: Continue per plan of care        Precautions: KEVIN, overweight      Manual              Laser: Lumbar             IASTM: R piriformis                                                        Exercise Diary             TA Set 5"x10 5"x20           TA Set c Marches  2x10           Bridges 2x10 3x10           R Piriformis St   20"x4 20"x3 ea           R SKC 20'x3 20"x3 ea           Seated Sciatic N [place]  1x15 ea           Clamshells             LTR  15"x3                                                                                                                                                                           Modalities

## 2020-02-04 ENCOUNTER — OFFICE VISIT (OUTPATIENT)
Dept: INTERNAL MEDICINE CLINIC | Facility: CLINIC | Age: 60
End: 2020-02-04
Payer: COMMERCIAL

## 2020-02-04 ENCOUNTER — OFFICE VISIT (OUTPATIENT)
Dept: PHYSICAL THERAPY | Facility: CLINIC | Age: 60
End: 2020-02-04
Payer: COMMERCIAL

## 2020-02-04 VITALS
OXYGEN SATURATION: 98 % | RESPIRATION RATE: 14 BRPM | BODY MASS INDEX: 29.71 KG/M2 | HEIGHT: 70 IN | WEIGHT: 207.5 LBS | TEMPERATURE: 97.7 F | DIASTOLIC BLOOD PRESSURE: 74 MMHG | SYSTOLIC BLOOD PRESSURE: 112 MMHG | HEART RATE: 74 BPM

## 2020-02-04 DIAGNOSIS — M54.50 CHRONIC BILATERAL LOW BACK PAIN WITHOUT SCIATICA: Primary | ICD-10-CM

## 2020-02-04 DIAGNOSIS — Z00.00 HEALTHCARE MAINTENANCE: ICD-10-CM

## 2020-02-04 DIAGNOSIS — Z12.5 PROSTATE CANCER SCREENING: ICD-10-CM

## 2020-02-04 DIAGNOSIS — E66.3 OVERWEIGHT (BMI 25.0-29.9): ICD-10-CM

## 2020-02-04 DIAGNOSIS — G89.29 CHRONIC BILATERAL LOW BACK PAIN WITHOUT SCIATICA: Primary | ICD-10-CM

## 2020-02-04 PROCEDURE — 97112 NEUROMUSCULAR REEDUCATION: CPT | Performed by: PHYSICAL THERAPIST

## 2020-02-04 PROCEDURE — 1036F TOBACCO NON-USER: CPT | Performed by: INTERNAL MEDICINE

## 2020-02-04 PROCEDURE — 99213 OFFICE O/P EST LOW 20 MIN: CPT | Performed by: INTERNAL MEDICINE

## 2020-02-04 PROCEDURE — 97110 THERAPEUTIC EXERCISES: CPT | Performed by: PHYSICAL THERAPIST

## 2020-02-04 PROCEDURE — 3008F BODY MASS INDEX DOCD: CPT | Performed by: INTERNAL MEDICINE

## 2020-02-04 NOTE — ASSESSMENT & PLAN NOTE
Patient will be coming in in July for routine physical   He was given a slip for complete labs to be performed prior to that visit  Again we discussed with the patient the importance of keeping us in touch as to his general medical status and whether not he would like to see pain management if his back pain is not improving

## 2020-02-04 NOTE — ASSESSMENT & PLAN NOTE
We did discuss again with the patient the importance of a routine weight loss program, decreasing his caloric intake, starting on some type of regular exercise program in order to help lose weight  Patient understands and agrees

## 2020-02-04 NOTE — ASSESSMENT & PLAN NOTE
Patient states that his back pain has shown no specific improvement  He also relates that he has only gone for 2 sessions with physical therapy and is not compliant with the exercise program that they initiated with him  On evaluation patient does have no significant changes from previously  Some paravertebral muscle spasm to lumbar region  Some decreased range of motion both actively and passively and no weakness to lower extremities  Patient was urged to continue with the physical therapy program and if he has completed this and is still having some discomfort or if worsening of symptoms would consider evaluation by pain management  Patient understands and agrees

## 2020-02-04 NOTE — PROGRESS NOTES
Assessment/Plan:    Chronic bilateral low back pain without sciatica  Patient states that his back pain has shown no specific improvement  He also relates that he has only gone for 2 sessions with physical therapy and is not compliant with the exercise program that they initiated with him  On evaluation patient does have no significant changes from previously  Some paravertebral muscle spasm to lumbar region  Some decreased range of motion both actively and passively and no weakness to lower extremities  Patient was urged to continue with the physical therapy program and if he has completed this and is still having some discomfort or if worsening of symptoms would consider evaluation by pain management  Patient understands and agrees  Healthcare maintenance  Patient will be coming in in July for routine physical   He was given a slip for complete labs to be performed prior to that visit  Again we discussed with the patient the importance of keeping us in touch as to his general medical status and whether not he would like to see pain management if his back pain is not improving  Overweight (BMI 25 0-29  9)  We did discuss again with the patient the importance of a routine weight loss program, decreasing his caloric intake, starting on some type of regular exercise program in order to help lose weight  Patient understands and agrees  Diagnoses and all orders for this visit:    Chronic bilateral low back pain without sciatica  -     Comprehensive metabolic panel; Future    Overweight (BMI 25 0-29 9)  -     TSH, 3rd generation with Free T4 reflex; Future    Healthcare maintenance  -     CBC and differential; Future  -     Lipid panel; Future  -     UA (URINE) with reflex to Scope; Future  -     TSH, 3rd generation with Free T4 reflex; Future    Prostate cancer screening  -     PSA, Total Screen; Future          Subjective:      Patient ID: Kya Jones is a 63 y o  male      67-year-old male with a history of no major medical problems in the past who is here today for re-evaluation  Patient has been going to physical therapy for his low back pain which is acute at this point time but also has a chronic component to it  Patient states he has had no significant change improvement in his low back pain and discomfort  On a scale of 1-10 he states at worst is approximately a 5 and on a daily basis approximately 2-3  He takes nonsteroidal anti-inflammatories including Advil 600 mg twice a day occasionally and Tylenol for his discomfort which she states does help  He has not been compliant with his exercise program outlined by physical therapy      The following portions of the patient's history were reviewed and updated as appropriate: He  has no past medical history on file  He   Patient Active Problem List    Diagnosis Date Noted    Chronic bilateral low back pain without sciatica 12/05/2019    Fatigue 12/05/2018    Overweight (BMI 25 0-29 9) 12/05/2018    Postnasal drip 12/05/2018    Mood disturbance 12/05/2018    KEVIN (obstructive sleep apnea)     Snoring     Daytime sleepiness     Left groin mass 06/26/2018    Healthcare maintenance 06/26/2018     He  has no past surgical history on file  His family history is not on file  He  reports that he has never smoked  He has never used smokeless tobacco  He reports that he does not drink alcohol or use drugs  No current outpatient medications on file  No current facility-administered medications for this visit  No current outpatient medications on file prior to visit  No current facility-administered medications on file prior to visit  He has No Known Allergies       Review of Systems   Constitutional: Negative  HENT: Negative  Eyes: Negative  Respiratory: Negative  Cardiovascular: Negative  Gastrointestinal: Negative  Genitourinary: Negative  Musculoskeletal: Positive for back pain   Negative for arthralgias, gait problem, joint swelling, myalgias, neck pain and neck stiffness  Skin: Negative  Allergic/Immunologic: Negative  Neurological: Negative  Hematological: Negative  Psychiatric/Behavioral: Negative  Objective:      /74   Pulse 74   Temp 97 7 °F (36 5 °C)   Resp 14   Ht 5' 10" (1 778 m)   Wt 94 1 kg (207 lb 8 oz)   SpO2 98%   BMI 29 77 kg/m²          Physical Exam   Constitutional: He is oriented to person, place, and time  He appears well-developed and well-nourished  Pleasant, overweight 60-year-old male who is awake alert no acute distress and oriented x3   HENT:   Head: Normocephalic and atraumatic  Eyes: Pupils are equal, round, and reactive to light  EOM are normal    Neck: Normal range of motion  Neck supple  Cardiovascular: Normal rate, regular rhythm and normal heart sounds  Pulmonary/Chest: Effort normal and breath sounds normal    Abdominal: Soft  Mildly overweight   Musculoskeletal: He exhibits deformity  He exhibits no edema or tenderness  Patient does have flattening to lumbar curve, some paravertebral muscle spasm notable to the lumbar spine bilaterally worse on the right than the left  No tenderness to palpation  Decreased range of motion both actively and passively  Some slight decrease straight leg raising bilaterally but no aggravation of pain and no weakness to the lower extremities   Neurological: He is alert and oriented to person, place, and time  He displays normal reflexes  No cranial nerve deficit or sensory deficit  He exhibits normal muscle tone  Coordination normal    Skin: Skin is warm and dry  He is not diaphoretic  Psychiatric: He has a normal mood and affect  His behavior is normal  Judgment and thought content normal    Nursing note and vitals reviewed

## 2020-02-04 NOTE — PROGRESS NOTES
Daily Note     Today's date: 2020  Patient name: Berenice Gibson  : 1960  MRN: [id number]  Referring provider: Jose Lucero DO  Dx: No diagnosis found  Subjective: The patient reports no change in symptoms since last visit  He notes poor compliance with his exercises  Objective: See treatment diary below      Assessment: The patient demonstrated mild pain with marches and bridges improved with shortening the range  Plan: Continue per plan of care  Add clamshells to HEP NV if pain-free        Precautions: KEVIN, overweight      Manual            Laser: Lumbar  4500J 4500J          IASTM: R piriformis  5 min np                                                     Exercise Diary            TA Set 5"x10 5"x20 5"x15          TA Set c Marches  2x10 3x10          Bridges 2x10 3x10 3x10          R Piriformis St   20"x4 20"x3 ea 20"x3 ea          R SKC 20'x3 20"x3 ea 20"x3 ea          Seated Sciatic N Glide  1x15 ea 2x10 ea          Clamshells   GTB 3x10          LTR  15"x3 15"x3                                                                                                                                                                          Modalities

## 2020-02-07 ENCOUNTER — ANESTHESIA (OUTPATIENT)
Dept: GASTROENTEROLOGY | Facility: AMBULARY SURGERY CENTER | Age: 60
End: 2020-02-07

## 2020-02-07 ENCOUNTER — HOSPITAL ENCOUNTER (OUTPATIENT)
Dept: GASTROENTEROLOGY | Facility: AMBULARY SURGERY CENTER | Age: 60
Setting detail: OUTPATIENT SURGERY
Discharge: HOME | End: 2020-02-07
Attending: COLON & RECTAL SURGERY | Admitting: COLON & RECTAL SURGERY
Payer: COMMERCIAL

## 2020-02-07 VITALS
BODY MASS INDEX: 29.06 KG/M2 | HEART RATE: 76 BPM | OXYGEN SATURATION: 99 % | RESPIRATION RATE: 16 BRPM | SYSTOLIC BLOOD PRESSURE: 133 MMHG | WEIGHT: 203 LBS | HEIGHT: 70 IN | TEMPERATURE: 97.1 F | DIASTOLIC BLOOD PRESSURE: 58 MMHG

## 2020-02-07 DIAGNOSIS — Z12.11 COLON CANCER SCREENING: ICD-10-CM

## 2020-02-07 PROCEDURE — 88305 TISSUE EXAM BY PATHOLOGIST: CPT | Performed by: PATHOLOGY

## 2020-02-07 PROCEDURE — 99243 OFF/OP CNSLTJ NEW/EST LOW 30: CPT | Performed by: COLON & RECTAL SURGERY

## 2020-02-07 PROCEDURE — 45380 COLONOSCOPY AND BIOPSY: CPT | Performed by: COLON & RECTAL SURGERY

## 2020-02-07 PROCEDURE — 45385 COLONOSCOPY W/LESION REMOVAL: CPT | Performed by: COLON & RECTAL SURGERY

## 2020-02-07 RX ORDER — PROPOFOL 10 MG/ML
INJECTION, EMULSION INTRAVENOUS AS NEEDED
Status: DISCONTINUED | OUTPATIENT
Start: 2020-02-07 | End: 2020-02-07 | Stop reason: SURG

## 2020-02-07 RX ORDER — SODIUM CHLORIDE, SODIUM LACTATE, POTASSIUM CHLORIDE, CALCIUM CHLORIDE 600; 310; 30; 20 MG/100ML; MG/100ML; MG/100ML; MG/100ML
125 INJECTION, SOLUTION INTRAVENOUS CONTINUOUS
Status: DISCONTINUED | OUTPATIENT
Start: 2020-02-07 | End: 2020-02-11 | Stop reason: HOSPADM

## 2020-02-07 RX ORDER — PROPOFOL 10 MG/ML
INJECTION, EMULSION INTRAVENOUS CONTINUOUS PRN
Status: DISCONTINUED | OUTPATIENT
Start: 2020-02-07 | End: 2020-02-07 | Stop reason: SURG

## 2020-02-07 RX ORDER — LIDOCAINE HYDROCHLORIDE 10 MG/ML
INJECTION, SOLUTION EPIDURAL; INFILTRATION; INTRACAUDAL; PERINEURAL AS NEEDED
Status: DISCONTINUED | OUTPATIENT
Start: 2020-02-07 | End: 2020-02-07 | Stop reason: SURG

## 2020-02-07 RX ADMIN — PROPOFOL 100 MG: 10 INJECTION, EMULSION INTRAVENOUS at 08:51

## 2020-02-07 RX ADMIN — PROPOFOL 50 MG: 10 INJECTION, EMULSION INTRAVENOUS at 08:53

## 2020-02-07 RX ADMIN — PROPOFOL 50 MG: 10 INJECTION, EMULSION INTRAVENOUS at 08:56

## 2020-02-07 RX ADMIN — SODIUM CHLORIDE, SODIUM LACTATE, POTASSIUM CHLORIDE, AND CALCIUM CHLORIDE: .6; .31; .03; .02 INJECTION, SOLUTION INTRAVENOUS at 08:32

## 2020-02-07 RX ADMIN — LIDOCAINE HYDROCHLORIDE 50 MG: 10 INJECTION, SOLUTION EPIDURAL; INFILTRATION; INTRACAUDAL; PERINEURAL at 08:51

## 2020-02-07 RX ADMIN — PROPOFOL 130 MCG/KG/MIN: 10 INJECTION, EMULSION INTRAVENOUS at 08:57

## 2020-02-07 NOTE — ANESTHESIA POSTPROCEDURE EVALUATION
Post-Op Assessment Note    CV Status:  Stable    Pain management: adequate     Mental Status:  Sleepy   Hydration Status:  Euvolemic   PONV Controlled:  Controlled   Airway Patency:  Patent   Post Op Vitals Reviewed: Yes      Staff: CRNA           BP   103/62   Temp   97 1   Pulse  72   Resp   16   SpO2   97 room air

## 2020-02-07 NOTE — ANESTHESIA PREPROCEDURE EVALUATION
Review of Systems/Medical History  Patient summary reviewed  Chart reviewed      Cardiovascular  Negative cardio ROS Exercise tolerance (METS): >4,     Pulmonary  Sleep apnea ,        GI/Hepatic    No GERD , Bowel prep       Negative  ROS        Endo/Other    Obesity    GYN       Hematology  Negative hematology ROS      Musculoskeletal  Negative musculoskeletal ROS        Neurology  Negative neurology ROS      Psychology   Negative psychology ROS              Physical Exam    Airway    Mallampati score: II  TM Distance: >3 FB  Neck ROM: full     Dental   No notable dental hx     Cardiovascular  Comment: Negative ROS, Cardiovascular exam normal    Pulmonary  Pulmonary exam normal     Other Findings        Anesthesia Plan  ASA Score- 2     Anesthesia Type- IV sedation with anesthesia with ASA Monitors  Additional Monitors:   Airway Plan:         Plan Factors-  Patient did not smoke on day of surgery  Induction- intravenous  Postoperative Plan-     Informed Consent- Anesthetic plan and risks discussed with patient  I personally reviewed this patient with the CRNA  Discussed and agreed on the Anesthesia Plan with the CRNA  Heather Toribio

## 2020-02-07 NOTE — H&P
History and Physical   Colon and Rectal Surgery   Robina Mason 63 y o  male MRN: [id number]  Unit/Bed#:  Encounter: [id number]  02/07/20   8:48 AM      CC: Screening    History of Present Illness   HPI:  Robina Mason is a 63 y o  male with no GI symtpoms  Historical Information   History reviewed  No pertinent past medical history  History reviewed  No pertinent surgical history  Meds/Allergies       (Not in a hospital admission)    No current outpatient medications on file  Current Facility-Administered Medications:     lactated ringers infusion, 125 mL/hr, Intravenous, Continuous, Shalonda Hernandez MD    No Known Allergies      Social History   Social History     Substance and Sexual Activity   Alcohol Use Yes    Frequency: 2-4 times a month     Social History     Substance and Sexual Activity   Drug Use No     Social History     Tobacco Use   Smoking Status Never Smoker   Smokeless Tobacco Never Used         Family History: History reviewed  No pertinent family history  Objective     Current Vitals:   Blood Pressure: 136/88 (02/07/20 0828)  Pulse: 80 (02/07/20 0828)  Temperature: 98 2 °F (36 8 °C) (02/07/20 0828)  Temp Source: Temporal (02/07/20 0828)  Respirations: 14 (02/07/20 0828)  Height: 5' 10" (177 8 cm) (02/07/20 7384)  Weight - Scale: 92 1 kg (203 lb) (02/07/20 0828)  SpO2: 100 % (02/07/20 0828)  No intake or output data in the 24 hours ending 02/07/20 0848    Physical Exam:  General:  Well nourished, no distress  Neuro: Alert and oriented  Eyes:Sclera anicteric, conjunctiva pink  Pulm: Clear to auscultation bilaterally  No respiratory Distress  CV:  Regular rate and rhythm  No murmurs  Abdomen:  Soft, flat, non-tender, without masses or hepatosplenomegaly  Lab Results:       ASSESSMENT:  Robina Mason is a 63 y o  male for for screening  PLAN:  Colonoscopy    Risks , including, but not limited to, bleeding, perforation, missed lesions, and potential need for surgery, were reviewed  Alternatives to colonoscopy were discussed    Tiffanie Sandoval MD

## 2020-02-10 ENCOUNTER — OFFICE VISIT (OUTPATIENT)
Dept: PHYSICAL THERAPY | Facility: CLINIC | Age: 60
End: 2020-02-10
Payer: COMMERCIAL

## 2020-02-10 DIAGNOSIS — M54.50 CHRONIC BILATERAL LOW BACK PAIN WITHOUT SCIATICA: Primary | ICD-10-CM

## 2020-02-10 DIAGNOSIS — G89.29 CHRONIC BILATERAL LOW BACK PAIN WITHOUT SCIATICA: Primary | ICD-10-CM

## 2020-02-10 PROCEDURE — 97112 NEUROMUSCULAR REEDUCATION: CPT | Performed by: PHYSICAL THERAPIST

## 2020-02-10 PROCEDURE — 97140 MANUAL THERAPY 1/> REGIONS: CPT | Performed by: PHYSICAL THERAPIST

## 2020-02-10 PROCEDURE — 97110 THERAPEUTIC EXERCISES: CPT | Performed by: PHYSICAL THERAPIST

## 2020-02-10 NOTE — PROGRESS NOTES
Daily Note     Today's date: 2/10/2020  Patient name: Jose Raul Duvall  : 1960  MRN: [id number]  Referring provider: Sho Collins DO  Dx:   Encounter Diagnosis     ICD-10-CM    1  Chronic bilateral low back pain without sciatica M54 5     G89 29                   Subjective: The patient reports minimal pain over the past two days  He continues to report poor compliance with his HEP  Objective: See treatment diary below      Assessment: The patient still notes mild pain with marches but no pain with bridges today  Plan: Continue per plan of care  Add clamshells to HEP if patient improves compliance        Precautions: KEVIN, overweight      Manual  1/14 1/30 2/4 2/10         Laser: Lumbar  4500J 4500J 5400J         IASTM: R piriformis  5 min np np                                                    Exercise Diary  1/14 1/30 2/4 2/10         TA Set 5"x10 5"x20 5"x15 5"x15         TA Set c Marches  2x10 3x10 3x10         Bridges 2x10 3x10 3x10 3x10         R Piriformis St   20"x4 20"x3 ea 20"x3 ea 20"x3 ea         R SKC 20'x3 20"x3 ea 20"x3 ea 20"x3 ea         Seated Sciatic N Glide  1x15 ea 2x10 ea 2x10 ea         Clamshells   GTB 3x10 GTB 3x10         LTR  15"x3 15"x3 15"x3         Reverse Pball Rollout    2x10                                                                                                                                                            Modalities

## 2020-02-13 ENCOUNTER — OFFICE VISIT (OUTPATIENT)
Dept: PHYSICAL THERAPY | Facility: CLINIC | Age: 60
End: 2020-02-13
Payer: COMMERCIAL

## 2020-02-13 DIAGNOSIS — G89.29 CHRONIC BILATERAL LOW BACK PAIN WITHOUT SCIATICA: Primary | ICD-10-CM

## 2020-02-13 DIAGNOSIS — M54.50 CHRONIC BILATERAL LOW BACK PAIN WITHOUT SCIATICA: Primary | ICD-10-CM

## 2020-02-13 PROCEDURE — 97110 THERAPEUTIC EXERCISES: CPT | Performed by: PHYSICAL THERAPIST

## 2020-02-13 PROCEDURE — 97112 NEUROMUSCULAR REEDUCATION: CPT | Performed by: PHYSICAL THERAPIST

## 2020-02-13 NOTE — PROGRESS NOTES
Daily Note     Today's date: 2020  Patient name: Mario Street  : 1960  MRN: [id number]  Referring provider: Kishore Harrell DO  Dx:   Encounter Diagnosis     ICD-10-CM    1  Chronic bilateral low back pain without sciatica M54 5     G89 29                   Subjective: The patient reports no change in symptoms over the past week  He notes slightly improved compliance with his HEP but still states he does not perform the exercises as frequently as prescribed  Objective: See treatment diary below  Repeated/sustained flexion and extension: negative      Assessment: The patient still demonstrates stabilization preference but exhibits little improvement in part due to poor HEP compliance  Plan: Continue per plan of care  Add clamshells to HEP if patient improves compliance        Precautions: KEVIN, overweight      Manual  1/14 1/30 2/4 2/10 2/13        Laser: Lumbar  4500J 4500J 5400J 5400J        IASTM: R piriformis  5 min np np         IASTM: B QL     5 min        Lumbar mobs     1x20 ea                         Exercise Diary  1/14 1/30 2/4 2/10 2/13        TA Set 5"x10 5"x20 5"x15 5"x15 5"x15        TA Set c Marches  2x10 3x10 3x10 2x10*        Bridges 2x10 3x10 3x10 3x10 3x10        R Piriformis St   20"x4 20"x3 ea 20"x3 ea 20"x3 ea 20"x3 ea        R SKC 20'x3 20"x3 ea 20"x3 ea 20"x3 ea 20"x3 ea        Seated Sciatic N Glide  1x15 ea 2x10 ea 2x10 ea np        Clamshells   GTB 3x10 GTB 3x10 GTB 3x12        LTR  15"x3 15"x3 15"x3 np        Reverse Pball Rollout    2x10 3x10        Standing Lumbar Extensions     2x10 D/C       DKTC     20"x4 D/C       PRIYA     5 min D/C                                                                                                                   Modalities

## 2020-02-18 ENCOUNTER — OFFICE VISIT (OUTPATIENT)
Dept: PHYSICAL THERAPY | Facility: CLINIC | Age: 60
End: 2020-02-18
Payer: COMMERCIAL

## 2020-02-18 DIAGNOSIS — G89.29 CHRONIC BILATERAL LOW BACK PAIN WITHOUT SCIATICA: Primary | ICD-10-CM

## 2020-02-18 DIAGNOSIS — M54.50 CHRONIC BILATERAL LOW BACK PAIN WITHOUT SCIATICA: Primary | ICD-10-CM

## 2020-02-18 PROCEDURE — 97112 NEUROMUSCULAR REEDUCATION: CPT

## 2020-02-18 PROCEDURE — 97140 MANUAL THERAPY 1/> REGIONS: CPT

## 2020-02-18 NOTE — PROGRESS NOTES
Daily Note     Today's date: 2020  Patient name: Sanna Sanchez  : 1960  MRN: [id number]  Referring provider: Camden Greene DO  Dx:   Encounter Diagnosis     ICD-10-CM    1  Chronic bilateral low back pain without sciatica M54 5     G89 29                   Subjective: Pt reports today for the first time he did not feel the nagging pain in LB  Pt admits he has been more faithful w/ HEP  Objective: See treatment diary below  Assessment: More tightness noted on R side of LB today  Good form noted w/ all TE  Plan: Continue per plan of care        Precautions: KEVIN, overweight      Manual  1/14 1/30 2/4 2/10 2/13 2x18       Laser: Lumbar  4500J 4500J 5400J 5400J 5400J       IASTM: R piriformis  5 min np np         IASTM: B QL     5 min 5 min       Lumbar mobs     1x20 ea np                        Exercise Diary  1/14 1/30 2/4 2/10 2/13 2/18       TA Set 5"x10 5"x20 5"x15 5"x15 5"x15 5"x15       TA Set c Marches  2x10 3x10 3x10 2x10* 2x10       Bridges 2x10 3x10 3x10 3x10 3x10 3x10       R Piriformis St   20"x4 20"x3 ea 20"x3 ea 20"x3 ea 20"x3 ea 20"x3 ea       R SKC 20'x3 20"x3 ea 20"x3 ea 20"x3 ea 20"x3 ea 20"x3 ea       Seated Sciatic N Glide  1x15 ea 2x10 ea 2x10 ea np        Clamshells   GTB 3x10 GTB 3x10 GTB 3x12 GTB 3x12       LTR  15"x3 15"x3 15"x3 np        Reverse Pball Rollout    2x10 3x10 3x10       Standing Lumbar Extensions     2x10 D/C       DKTC     20"x4 D/C       PRIYA     5 min D/C                                                                                                                   Modalities

## 2020-02-20 ENCOUNTER — OFFICE VISIT (OUTPATIENT)
Dept: PHYSICAL THERAPY | Facility: CLINIC | Age: 60
End: 2020-02-20
Payer: COMMERCIAL

## 2020-02-20 DIAGNOSIS — G89.29 CHRONIC BILATERAL LOW BACK PAIN WITHOUT SCIATICA: Primary | ICD-10-CM

## 2020-02-20 DIAGNOSIS — M54.50 CHRONIC BILATERAL LOW BACK PAIN WITHOUT SCIATICA: Primary | ICD-10-CM

## 2020-02-20 PROCEDURE — 97110 THERAPEUTIC EXERCISES: CPT | Performed by: PHYSICAL THERAPIST

## 2020-02-20 PROCEDURE — 97112 NEUROMUSCULAR REEDUCATION: CPT | Performed by: PHYSICAL THERAPIST

## 2020-02-25 ENCOUNTER — APPOINTMENT (OUTPATIENT)
Dept: PHYSICAL THERAPY | Facility: CLINIC | Age: 60
End: 2020-02-25
Payer: COMMERCIAL

## 2020-02-27 ENCOUNTER — APPOINTMENT (OUTPATIENT)
Dept: PHYSICAL THERAPY | Facility: CLINIC | Age: 60
End: 2020-02-27
Payer: COMMERCIAL

## 2020-03-03 ENCOUNTER — OFFICE VISIT (OUTPATIENT)
Dept: PHYSICAL THERAPY | Facility: CLINIC | Age: 60
End: 2020-03-03
Payer: COMMERCIAL

## 2020-03-03 DIAGNOSIS — G89.29 CHRONIC BILATERAL LOW BACK PAIN WITHOUT SCIATICA: Primary | ICD-10-CM

## 2020-03-03 DIAGNOSIS — M54.50 CHRONIC BILATERAL LOW BACK PAIN WITHOUT SCIATICA: Primary | ICD-10-CM

## 2020-03-03 PROCEDURE — 97140 MANUAL THERAPY 1/> REGIONS: CPT

## 2020-03-03 PROCEDURE — 97110 THERAPEUTIC EXERCISES: CPT

## 2020-03-03 NOTE — PROGRESS NOTES
Daily Note     Today's date: 3/3/2020  Patient name: Clarissa Matias  : 1960  MRN: [id number]  Referring provider: Claudetta Orchard, DO  Dx:   Encounter Diagnosis     ICD-10-CM    1  Chronic bilateral low back pain without sciatica M54 5     G89 29                   Subjective:Pt denies pain in LB today  He states overall he has had decreased pain levels and only notices increased pain with jarring movements like landing heavily on his feet  Objective: See treatment diary below  Assessment: Moderate muscle tone noted today in b/l QL  Plan: Continue per plan of care        Precautions: KEVIN, overweight      Manual  1/14 1/30 2/4 2/10 2/13 2x18 2/20 3/3     Laser: Lumbar  4500J 4500J 5400J 5400J 5400J 5400J      IASTM: R piriformis  5 min np np         IASTM: B QL     5 min 5 min 5 min      Lumbar mobs     1x20 ea np 1x20 ea np                      Exercise Diary  1/14 1/30 2/4 2/10 2/13 2/18 2/20 3/3     TA Set 5"x10 5"x20 5"x15 5"x15 5"x15 5"x15 5"x15 5"x15     TA Set c Marches  2x10 3x10 3x10 2x10* 2x10 1x20 1x30     Bridges 2x10 3x10 3x10 3x10 3x10 3x10 3x12 3x12     R Piriformis St   20"x4 20"x3 ea 20"x3 ea 20"x3 ea 20"x3 ea 20"x3 ea 20"x3 ea 20"x3 ea     SKC 20'x3 20"x3 ea 20"x3 ea 20"x3 ea 20"x3 ea 20"x3 ea 20"x3 ea 20"x3 ea     Seated Sciatic N Glide  1x15 ea 2x10 ea 2x10 ea np        Clamshells   GTB 3x10 GTB 3x10 GTB 3x12 GTB 3x12 G/ 3x12 G/  3x15 B/ NV    LTR  15"x3 15"x3 15"x3 np   np     Reverse Pball Rollout    2x10 3x10 3x10 3x10 3x10     Standing Lumbar Extensions     2x10 D/C       DKTC     20"x4 D/C       PRIYA     5 min D/C                                                                                                                   Modalities

## 2020-03-05 ENCOUNTER — APPOINTMENT (OUTPATIENT)
Dept: PHYSICAL THERAPY | Facility: CLINIC | Age: 60
End: 2020-03-05
Payer: COMMERCIAL

## 2020-03-10 ENCOUNTER — OFFICE VISIT (OUTPATIENT)
Dept: PHYSICAL THERAPY | Facility: CLINIC | Age: 60
End: 2020-03-10
Payer: COMMERCIAL

## 2020-03-10 DIAGNOSIS — M54.50 CHRONIC BILATERAL LOW BACK PAIN WITHOUT SCIATICA: Primary | ICD-10-CM

## 2020-03-10 DIAGNOSIS — G89.29 CHRONIC BILATERAL LOW BACK PAIN WITHOUT SCIATICA: Primary | ICD-10-CM

## 2020-03-10 PROCEDURE — 97112 NEUROMUSCULAR REEDUCATION: CPT | Performed by: PHYSICAL THERAPIST

## 2020-03-10 NOTE — PROGRESS NOTES
Daily Note     Today's date: 3/10/2020  Patient name: Trudy Franco  : 1960  MRN: [id number]  Referring provider: Anais Matthews DO  Dx:   No diagnosis found  Subjective:Pt reports slowly improving pain levels denying any pain currently  Objective: See treatment diary below  Assessment: Patient demonstrated good tolerance core strengthening progressions  Exercises modified due to a time constraint  Resume NV  Plan: Continue per plan of care        Precautions: KEVIN, overweight      Manual  1/14 1/30 2/4 2/10 2/13 2x18 2/20 3/3 3/10    Laser: Lumbar  4500J 4500J 5400J 5400J 5400J 5400J  5400J    IASTM: R piriformis  5 min np np         IASTM: B QL     5 min 5 min 5 min  5 min    Lumbar mobs     1x20 ea np 1x20 ea np np                     Exercise Diary  1/14 1/30 2/4 2/10 2/13 2/18 2/20 3/3 3/10    TA Set 5"x10 5"x20 5"x15 5"x15 5"x15 5"x15 5"x15 5"x15 5"x15    TA Set c Marches  2x10 3x10 3x10 2x10* 2x10 1x20 1x30 1x30    Bridges 2x10 3x10 3x10 3x10 3x10 3x10 3x12 3x12 3x12    R Piriformis St   20"x4 20"x3 ea 20"x3 ea 20"x3 ea 20"x3 ea 20"x3 ea 20"x3 ea 20"x3 ea np    SKC 20'x3 20"x3 ea 20"x3 ea 20"x3 ea 20"x3 ea 20"x3 ea 20"x3 ea 20"x3 ea np    Seated Sciatic N Glide  1x15 ea 2x10 ea 2x10 ea np        Clamshells   GTB 3x10 GTB 3x10 GTB 3x12 GTB 3x12 G/ 3x12 G/  3x15 B/  3x12    LTR  15"x3 15"x3 15"x3 np   np     Reverse Pball Rollout    2x10 3x10 3x10 3x10 3x10 3x10    Standing Lumbar Extensions     2x10 D/C       DKTC     20"x4 D/C       PRIYA     5 min D/C                                                                                                                   Modalities

## 2020-03-12 ENCOUNTER — OFFICE VISIT (OUTPATIENT)
Dept: PHYSICAL THERAPY | Facility: CLINIC | Age: 60
End: 2020-03-12
Payer: COMMERCIAL

## 2020-03-12 DIAGNOSIS — M54.50 CHRONIC BILATERAL LOW BACK PAIN WITHOUT SCIATICA: Primary | ICD-10-CM

## 2020-03-12 DIAGNOSIS — G89.29 CHRONIC BILATERAL LOW BACK PAIN WITHOUT SCIATICA: Primary | ICD-10-CM

## 2020-03-12 PROCEDURE — 97140 MANUAL THERAPY 1/> REGIONS: CPT

## 2020-03-12 PROCEDURE — 97110 THERAPEUTIC EXERCISES: CPT

## 2020-03-12 NOTE — PROGRESS NOTES
Daily Note     Today's date: 3/12/2020  Patient name: Vinicio Rivera  : 1960  MRN: [id number]  Referring provider: Ashwin Thomas DO  Dx:   Encounter Diagnosis     ICD-10-CM    1  Chronic bilateral low back pain without sciatica M54 5     G89 29                   Subjective:Pt reports no low back pain currently, mild twinges with jogging  Objective: See treatment diary below  Assessment: Patient demonstrated increased tolerance to hip and core strengthening, low pain levels with ADLs  Plan: Continue poc as per PT        Precautions: KEVIN, overweight      Manual  1/14 1/30 2/4 2/10 2/13 2x18 2/20 3/3 3/10 3/12   Laser: Lumbar  4500J 4500J 5400J 5400J 5400J 5400J  5400J 5400J   IASTM: R piriformis  5 min np np         IASTM: B QL     5 min 5 min 5 min  5 min 5 min   Lumbar mobs     1x20 ea np 1x20 ea np np np                    Exercise Diary  1/14 1/30 2/4 2/10 2/13 2/18 2/20 3/3 3/10 3/12   TA Set 5"x10 5"x20 5"x15 5"x15 5"x15 5"x15 5"x15 5"x15 5"x15 5"x15   TA Set c Marches  2x10 3x10 3x10 2x10* 2x10 1x20 1x30 1x30 2x20   Bridges 2x10 3x10 3x10 3x10 3x10 3x10 3x12 3x12 3x12 3x15   R Piriformis St   20"x4 20"x3 ea 20"x3 ea 20"x3 ea 20"x3 ea 20"x3 ea 20"x3 ea 20"x3 ea np 20"x3   SKC 20'x3 20"x3 ea 20"x3 ea 20"x3 ea 20"x3 ea 20"x3 ea 20"x3 ea 20"x3 ea np 20"x3   Seated Sciatic N Glide  1x15 ea 2x10 ea 2x10 ea np        Clamshells   GTB 3x10 GTB 3x10 GTB 3x12 GTB 3x12 G/ 3x12 G/  3x15 B/  3x12 B/  3x12   LTR  15"x3 15"x3 15"x3 np   np     Reverse Pball Rollout    2x10 3x10 3x10 3x10 3x10 3x10 3x10   Standing Lumbar Extensions     2x10 D/C       DKTC     20"x4 D/C       PRIYA     5 min D/C                                                                                                                   Modalities

## 2020-03-16 ENCOUNTER — OFFICE VISIT (OUTPATIENT)
Dept: PHYSICAL THERAPY | Facility: CLINIC | Age: 60
End: 2020-03-16
Payer: COMMERCIAL

## 2020-03-16 DIAGNOSIS — G89.29 CHRONIC BILATERAL LOW BACK PAIN WITHOUT SCIATICA: Primary | ICD-10-CM

## 2020-03-16 DIAGNOSIS — M54.50 CHRONIC BILATERAL LOW BACK PAIN WITHOUT SCIATICA: Primary | ICD-10-CM

## 2020-03-16 PROCEDURE — 97110 THERAPEUTIC EXERCISES: CPT | Performed by: PHYSICAL THERAPIST

## 2020-03-16 PROCEDURE — 97112 NEUROMUSCULAR REEDUCATION: CPT | Performed by: PHYSICAL THERAPIST

## 2020-03-17 NOTE — PROGRESS NOTES
Daily Note     Today's date: 3/16/2020  Patient name: Lilian Velasquez  : 1960  MRN: [id number]  Referring provider: Frank Lee DO  Dx:   Encounter Diagnosis     ICD-10-CM    1  Chronic bilateral low back pain without sciatica M54 5     G89 29        Start Time:   Stop Time:   Total time in clinic (min): 40 minutes      Subjective:Pt reports low levels of LBP today and occasional increases in pain over the weekend  Objective: See treatment diary below  Assessment: Patient demonstrated good tolerance to core strengthening and improved QL tone  Plan: Continue poc        Precautions: KEVIN, overweight      Manual  3/16            Laser: Lumbar 5400J            IASTM: R piriformis             IASTM: B QL 5 min            Lumbar mobs                              Exercise Diary  3/16        3/10 3/12   TA Set 5"x15        5"x15 5"x15   TA Set c Marches 2x20        1x30 2x20   Bridges 3x15        3x12 3x15   R Piriformis St   20"x3        np 20"x3   SKC 20"x3        np 20"x3                Clamshells B/ 3x12        B/  3x12 B/  3x12   LTR             Reverse Pball Rollout 3x15        3x10 3x10                                                                                                                                                      Modalities

## 2020-03-19 ENCOUNTER — APPOINTMENT (OUTPATIENT)
Dept: PHYSICAL THERAPY | Facility: CLINIC | Age: 60
End: 2020-03-19
Payer: COMMERCIAL

## 2020-07-26 LAB
ALBUMIN SERPL-MCNC: 4.4 G/DL (ref 3.6–5.1)
ALBUMIN/GLOB SERPL: 1.8 (CALC) (ref 1–2.5)
ALP SERPL-CCNC: 63 U/L (ref 35–144)
ALT SERPL-CCNC: 19 U/L (ref 9–46)
APPEARANCE UR: CLEAR
AST SERPL-CCNC: 16 U/L (ref 10–35)
BASOPHILS # BLD AUTO: 37 CELLS/UL (ref 0–200)
BASOPHILS NFR BLD AUTO: 0.5 %
BILIRUB SERPL-MCNC: 0.5 MG/DL (ref 0.2–1.2)
BILIRUB UR QL STRIP: NEGATIVE
BUN SERPL-MCNC: 18 MG/DL (ref 7–25)
BUN/CREAT SERPL: NORMAL (CALC) (ref 6–22)
CALCIUM SERPL-MCNC: 9.8 MG/DL (ref 8.6–10.3)
CHLORIDE SERPL-SCNC: 103 MMOL/L (ref 98–110)
CHOLEST SERPL-MCNC: 294 MG/DL
CHOLEST/HDLC SERPL: 6.1 (CALC)
CO2 SERPL-SCNC: 29 MMOL/L (ref 20–32)
COLOR UR: YELLOW
CREAT SERPL-MCNC: 1.07 MG/DL (ref 0.7–1.33)
EOSINOPHIL # BLD AUTO: 161 CELLS/UL (ref 15–500)
EOSINOPHIL NFR BLD AUTO: 2.2 %
ERYTHROCYTE [DISTWIDTH] IN BLOOD BY AUTOMATED COUNT: 13.1 % (ref 11–15)
GLOBULIN SER CALC-MCNC: 2.4 G/DL (CALC) (ref 1.9–3.7)
GLUCOSE SERPL-MCNC: 93 MG/DL (ref 65–99)
GLUCOSE UR QL STRIP: NEGATIVE
HCT VFR BLD AUTO: 45.3 % (ref 38.5–50)
HDLC SERPL-MCNC: 48 MG/DL
HGB BLD-MCNC: 15.4 G/DL (ref 13.2–17.1)
HGB UR QL STRIP: NEGATIVE
KETONES UR QL STRIP: NEGATIVE
LDLC SERPL CALC-MCNC: 208 MG/DL (CALC)
LEUKOCYTE ESTERASE UR QL STRIP: NEGATIVE
LYMPHOCYTES # BLD AUTO: 1256 CELLS/UL (ref 850–3900)
LYMPHOCYTES NFR BLD AUTO: 17.2 %
MCH RBC QN AUTO: 29.9 PG (ref 27–33)
MCHC RBC AUTO-ENTMCNC: 34 G/DL (ref 32–36)
MCV RBC AUTO: 88 FL (ref 80–100)
MONOCYTES # BLD AUTO: 475 CELLS/UL (ref 200–950)
MONOCYTES NFR BLD AUTO: 6.5 %
NEUTROPHILS # BLD AUTO: 5373 CELLS/UL (ref 1500–7800)
NEUTROPHILS NFR BLD AUTO: 73.6 %
NITRITE UR QL STRIP: NEGATIVE
NONHDLC SERPL-MCNC: 246 MG/DL (CALC)
PH UR STRIP: 5.5 [PH] (ref 5–8)
PLATELET # BLD AUTO: 294 THOUSAND/UL (ref 140–400)
PMV BLD REES-ECKER: 10.1 FL (ref 7.5–12.5)
POTASSIUM SERPL-SCNC: 4.4 MMOL/L (ref 3.5–5.3)
PROT SERPL-MCNC: 6.8 G/DL (ref 6.1–8.1)
PROT UR QL STRIP: NEGATIVE
PSA SERPL-MCNC: 1.7 NG/ML
RBC # BLD AUTO: 5.15 MILLION/UL (ref 4.2–5.8)
SL AMB EGFR AFRICAN AMERICAN: 88 ML/MIN/1.73M2
SL AMB EGFR NON AFRICAN AMERICAN: 76 ML/MIN/1.73M2
SODIUM SERPL-SCNC: 139 MMOL/L (ref 135–146)
SP GR UR STRIP: 1.01 (ref 1–1.03)
TRIGL SERPL-MCNC: 204 MG/DL
TSH SERPL-ACNC: 1.7 MIU/L (ref 0.4–4.5)
WBC # BLD AUTO: 7.3 THOUSAND/UL (ref 3.8–10.8)

## 2020-08-14 ENCOUNTER — OFFICE VISIT (OUTPATIENT)
Dept: INTERNAL MEDICINE CLINIC | Facility: CLINIC | Age: 60
End: 2020-08-14
Payer: COMMERCIAL

## 2020-08-14 VITALS
WEIGHT: 203 LBS | DIASTOLIC BLOOD PRESSURE: 68 MMHG | HEART RATE: 92 BPM | BODY MASS INDEX: 29.06 KG/M2 | TEMPERATURE: 98.6 F | SYSTOLIC BLOOD PRESSURE: 114 MMHG | HEIGHT: 70 IN | OXYGEN SATURATION: 98 %

## 2020-08-14 DIAGNOSIS — E66.3 OVERWEIGHT: ICD-10-CM

## 2020-08-14 DIAGNOSIS — R53.83 FATIGUE, UNSPECIFIED TYPE: ICD-10-CM

## 2020-08-14 DIAGNOSIS — Z00.00 HEALTHCARE MAINTENANCE: ICD-10-CM

## 2020-08-14 DIAGNOSIS — R19.09 LEFT GROIN MASS: ICD-10-CM

## 2020-08-14 DIAGNOSIS — E78.01 FAMILIAL HYPERCHOLESTEROLEMIA: Primary | ICD-10-CM

## 2020-08-14 DIAGNOSIS — M54.50 CHRONIC BILATERAL LOW BACK PAIN WITHOUT SCIATICA: ICD-10-CM

## 2020-08-14 DIAGNOSIS — G89.29 CHRONIC BILATERAL LOW BACK PAIN WITHOUT SCIATICA: ICD-10-CM

## 2020-08-14 DIAGNOSIS — H91.93 DECREASED HEARING OF BOTH EARS: ICD-10-CM

## 2020-08-14 PROCEDURE — 3008F BODY MASS INDEX DOCD: CPT | Performed by: INTERNAL MEDICINE

## 2020-08-14 PROCEDURE — 1036F TOBACCO NON-USER: CPT | Performed by: INTERNAL MEDICINE

## 2020-08-14 PROCEDURE — 99396 PREV VISIT EST AGE 40-64: CPT | Performed by: INTERNAL MEDICINE

## 2020-08-14 PROCEDURE — 3725F SCREEN DEPRESSION PERFORMED: CPT | Performed by: INTERNAL MEDICINE

## 2020-08-14 RX ORDER — ROSUVASTATIN CALCIUM 5 MG/1
5 TABLET, COATED ORAL DAILY
Qty: 30 TABLET | Refills: 5 | Status: SHIPPED | OUTPATIENT
Start: 2020-08-14 | End: 2021-01-23

## 2020-08-14 NOTE — ASSESSMENT & PLAN NOTE
Patient has been watching to make sure there is no changes in the left groin area  On evaluation patient does have some fatty tissue  No masses no evidence of herniation    Again patient is urged to call if any significant changes but on physical findings today it seems to be benign

## 2020-08-14 NOTE — ASSESSMENT & PLAN NOTE
Patient was told again with his BMI is considered overweight  The importance of watching his caloric intake in order to help lose weight  Does relate that he is on a very standard diet without any significant changes  We did discuss with the patient trying to reduce his portion size in order to help lose weight, starting some type of routine exercise program in order to help with weight loss  Patient understands and hopefully we will see some weight loss with his next visit

## 2020-08-14 NOTE — ASSESSMENT & PLAN NOTE
As mentioned patient is having difficulties with hearing loss  He states this is become progressively worse over the years  We did discuss with the patient having hearing evaluation to formally determine the extent of his hearing loss  Patient understands and agrees  A referral was made to an ears nose and throat physician    No abnormalities were found on physical exam

## 2020-08-14 NOTE — ASSESSMENT & PLAN NOTE
Patient is here today for a full physical   He did have extensive labs completed prior to the visit today we did discuss the results  The only outlying lab test that was noted was an increase in his cholesterol reading to almost 300  Otherwise labs were essentially normal   Patient states in general doing well his back is doing better with less pain  He continues to do exercise on a daily basis, stretching  States that occasionally he does have some weakness in the right leg which does inhibit ambulation for short period of time but this is transient  Patient has no significant pain  He denies any chest pain or pressure and no increasing shortness of breath with exertion  States his appetite is adequate he is having no bowel or bladder difficulties  He did undergo a routine colonoscopy and it showed 1 small polyp which was benign  Patient states he has having no difficulties with his appetite no headaches, no lightheadedness, no dizziness  Does relate that he does have decreased auditory acuity bilaterally which is getting worse  Patient did undergo full physical exam today in the office  Of than him being overweight no significant medical problems on physical exam were found  Patient will have a referral be seen by audiology for hearing testing  Was placed on a statin drug and we will check liver function tests and cholesterol in approximately 4 months    We did discuss possible side effects of the medication a was told to call if any problems with the medication

## 2020-08-14 NOTE — ASSESSMENT & PLAN NOTE
Patient states that he has functional   No significant chronic pain or difficulties related to his low back pain discomfort  He is following the exercise as prescribed by physical therapy  He was told if any change as far as symptomatology is concerned, any increasing weakness to lower extremities, bowel or bladder difficulties to please call

## 2020-08-14 NOTE — ASSESSMENT & PLAN NOTE
Patient relates that there is a strong family history of hyperlipidemia  His cholesterol is elevated and we did discuss with the patient today the importance of watching his intake of fatty foods, working at weight loss  Patient's cholesterol is high enough that we feel it is appropriate to start medication  He was given a prescription for Crestor 5 mg to take daily  He was told that this medication does work in the liver and can cause some slight inflammation to liver tissue  Was told if any darkening of the urine, at jaundice to please call  Patient was also told about possible myalgias, muscle pain that can be a side effect from the medication  Patient was told if he develops any of these symptoms to please call immediately for evaluation    Again we will be checking patient's liver enzymes and cholesterol with his next visit

## 2020-12-08 PROBLEM — H90.3 SENSORINEURAL HEARING LOSS (SNHL) OF BOTH EARS: Status: ACTIVE | Noted: 2020-12-08

## 2021-01-08 LAB
ALBUMIN SERPL-MCNC: 4.4 G/DL (ref 3.6–5.1)
ALBUMIN/GLOB SERPL: 1.9 (CALC) (ref 1–2.5)
ALP SERPL-CCNC: 64 U/L (ref 35–144)
ALT SERPL-CCNC: 24 U/L (ref 9–46)
AST SERPL-CCNC: 16 U/L (ref 10–35)
BILIRUB DIRECT SERPL-MCNC: 0.1 MG/DL
BILIRUB INDIRECT SERPL-MCNC: 0.4 MG/DL (CALC) (ref 0.2–1.2)
BILIRUB SERPL-MCNC: 0.5 MG/DL (ref 0.2–1.2)
CHOLEST SERPL-MCNC: 203 MG/DL
CHOLEST/HDLC SERPL: 3.8 (CALC)
GLOBULIN SER CALC-MCNC: 2.3 G/DL (CALC) (ref 1.9–3.7)
HDLC SERPL-MCNC: 53 MG/DL
LDLC SERPL CALC-MCNC: 125 MG/DL (CALC)
NONHDLC SERPL-MCNC: 150 MG/DL (CALC)
PROT SERPL-MCNC: 6.7 G/DL (ref 6.1–8.1)
TRIGL SERPL-MCNC: 132 MG/DL

## 2021-01-15 ENCOUNTER — OFFICE VISIT (OUTPATIENT)
Dept: INTERNAL MEDICINE CLINIC | Facility: CLINIC | Age: 61
End: 2021-01-15
Payer: COMMERCIAL

## 2021-01-15 VITALS
WEIGHT: 206 LBS | SYSTOLIC BLOOD PRESSURE: 106 MMHG | HEIGHT: 70 IN | OXYGEN SATURATION: 98 % | BODY MASS INDEX: 29.49 KG/M2 | TEMPERATURE: 96.8 F | DIASTOLIC BLOOD PRESSURE: 74 MMHG | HEART RATE: 69 BPM

## 2021-01-15 DIAGNOSIS — E78.01 FAMILIAL HYPERCHOLESTEROLEMIA: Primary | ICD-10-CM

## 2021-01-15 DIAGNOSIS — M54.41 CHRONIC RIGHT-SIDED LOW BACK PAIN WITH RIGHT-SIDED SCIATICA: ICD-10-CM

## 2021-01-15 DIAGNOSIS — Z12.11 COLON CANCER SCREENING: ICD-10-CM

## 2021-01-15 DIAGNOSIS — Z12.5 PROSTATE CANCER SCREENING: ICD-10-CM

## 2021-01-15 DIAGNOSIS — E66.3 OVERWEIGHT: ICD-10-CM

## 2021-01-15 DIAGNOSIS — G89.29 CHRONIC RIGHT-SIDED LOW BACK PAIN WITH RIGHT-SIDED SCIATICA: ICD-10-CM

## 2021-01-15 DIAGNOSIS — Z00.00 HEALTHCARE MAINTENANCE: ICD-10-CM

## 2021-01-15 PROBLEM — M54.40 CHRONIC RIGHT-SIDED LOW BACK PAIN WITH SCIATICA: Status: ACTIVE | Noted: 2019-12-05

## 2021-01-15 PROCEDURE — 99214 OFFICE O/P EST MOD 30 MIN: CPT | Performed by: INTERNAL MEDICINE

## 2021-01-15 PROCEDURE — 3008F BODY MASS INDEX DOCD: CPT | Performed by: INTERNAL MEDICINE

## 2021-01-15 PROCEDURE — 1036F TOBACCO NON-USER: CPT | Performed by: INTERNAL MEDICINE

## 2021-01-15 NOTE — ASSESSMENT & PLAN NOTE
With the patient's BMI he is still considered overweight  No significant change in his weight since his last visit  We did discuss with the patient the importance of looking closely at his caloric intake in order to help lose weight, starting on some type of routine exercise program in order to burn off calories

## 2021-01-15 NOTE — PROGRESS NOTES
Assessment/Plan:    Familial hypercholesterolemia   Patient did have a lipid profile performed prior to the visit today and we did discuss the results of length with the patient  Patient has had a significant improvement in his total cholesterol level, dramatic decrease in his LDL and the HDL has been stable  Patient admits that he is not watching his diet and he still has a a slight elevation as non LDL cholesterol that puts him at higher risk heart disease  We have  Made a decision today to continue with present treatment and patient has had no side effects from the medication we will check another lipid profile in 6 months and make modification of treatment if needed depending on the results  We did reinforce with the patient the importance of watching his diet , limiting his intake fats and cholesterol with his diet, starting some type of routine exercise program which will be helpful to boost his HDL cholesterol  Healthcare maintenance    The patient will be returning to the office in 6 months for routine physical   He was given a slip for complete labs to be performed prior to the visit  He was told in the interim if any new medical  Concerns or issues to please call  Chronic right-sided low back pain with sciatica   We did have a discussion today about the patient's chronic low back pain  He states that it is manageable at this point time and the discomfort does come and go  He also relates occasionally he feels some weakness in the right leg but no difficulties with ambulation  On exam today the patient does have some some white weakness to the quadriceps on the right, weakness with dorsiflexion of his right foot  He does relate that he occasionally takes a nonsteroidal anti-inflammatory like Advil for any discomfort and he states this does help  We did reinforce the importance of exercise as instructed by physical therapy    He was told if any changes to please call    Overweight   With the patient's BMI he is still considered overweight  No significant change in his weight since his last visit  We did discuss with the patient the importance of looking closely at his caloric intake in order to help lose weight, starting on some type of routine exercise program in order to burn off calories  Diagnoses and all orders for this visit:    Familial hypercholesterolemia  -     Comprehensive metabolic panel; Future  -     CBC and differential; Future  -     Lipid panel; Future  -     UA (URINE) with reflex to Scope; Future    Healthcare maintenance  -     Comprehensive metabolic panel; Future  -     CBC and differential; Future  -     Lipid panel; Future  -     UA (URINE) with reflex to Scope; Future    Colon cancer screening  -     Occult Blood, Fecal Immunochemical; Future    Prostate cancer screening  -     PSA, Total Screen; Future    Chronic right-sided low back pain with right-sided sciatica    Overweight          Subjective:      Patient ID: Courtney Garcia is a 62 y o  male  Patient is a 64-year-old male history of medical problems as outlined previously  Patient is here today for follow-up after 6 month period of time  He did have labs performed prior to the visit today specifically looking at his cholesterol level and we did discuss the results the patient period patient states he is feeling well and presents with no new medical complaints or concerns      The following portions of the patient's history were reviewed and updated as appropriate: He  has no past medical history on file    He   Patient Active Problem List    Diagnosis Date Noted    Sensorineural hearing loss (SNHL) of both ears 12/08/2020    Familial hypercholesterolemia 08/14/2020    Decreased hearing of both ears 08/14/2020    Chronic right-sided low back pain with sciatica 12/05/2019    Fatigue 12/05/2018    Overweight 12/05/2018    Postnasal drip 12/05/2018    Mood disturbance 12/05/2018    KEVIN (obstructive sleep apnea)     Snoring     Daytime sleepiness     Left groin mass 06/26/2018    Healthcare maintenance 06/26/2018     He  has no past surgical history on file  His family history is not on file  He  reports that he has never smoked  He has never used smokeless tobacco  He reports current alcohol use  He reports that he does not use drugs  Current Outpatient Medications   Medication Sig Dispense Refill    rosuvastatin (CRESTOR) 5 mg tablet Take 1 tablet (5 mg total) by mouth daily 30 tablet 5     No current facility-administered medications for this visit  Current Outpatient Medications on File Prior to Visit   Medication Sig    rosuvastatin (CRESTOR) 5 mg tablet Take 1 tablet (5 mg total) by mouth daily     No current facility-administered medications on file prior to visit  He has No Known Allergies       Review of Systems   Constitutional: Negative  HENT: Negative  Eyes: Negative  Respiratory: Negative  Cardiovascular: Negative  Gastrointestinal: Negative  Endocrine: Negative  Genitourinary: Negative  Musculoskeletal: Positive for back pain  Negative for arthralgias, gait problem, joint swelling, myalgias, neck pain and neck stiffness  Skin: Negative  Allergic/Immunologic: Negative  Neurological: Negative  Hematological: Negative  Psychiatric/Behavioral: Negative  Objective:      /74   Pulse 69   Temp (!) 96 8 °F (36 °C) (Tympanic)   Ht 5' 10" (1 778 m)   Wt 93 4 kg (206 lb)   SpO2 98%   BMI 29 56 kg/m²          Physical Exam  Vitals signs and nursing note reviewed  Constitutional:       General: He is not in acute distress  Appearance: Normal appearance  He is not ill-appearing, toxic-appearing or diaphoretic  Comments:  Pleasant, mildly overweight 23-year-old male who is awake alert no acute distress and oriented x3   HENT:      Head: Normocephalic and atraumatic        Right Ear: Tympanic membrane, ear canal and external ear normal  There is no impacted cerumen  Left Ear: Tympanic membrane, ear canal and external ear normal  There is no impacted cerumen  Nose: Nose normal  No congestion or rhinorrhea  Mouth/Throat:      Mouth: Mucous membranes are moist       Pharynx: Oropharynx is clear  No oropharyngeal exudate or posterior oropharyngeal erythema  Eyes:      General: No scleral icterus  Right eye: No discharge  Left eye: No discharge  Extraocular Movements: Extraocular movements intact  Conjunctiva/sclera: Conjunctivae normal       Pupils: Pupils are equal, round, and reactive to light  Neck:      Musculoskeletal: Normal range of motion and neck supple  No neck rigidity or muscular tenderness  Vascular: No carotid bruit  Cardiovascular:      Rate and Rhythm: Normal rate and regular rhythm  Pulses: Normal pulses  Heart sounds: Normal heart sounds  No murmur  No friction rub  No gallop  Pulmonary:      Effort: Pulmonary effort is normal  No respiratory distress  Breath sounds: Normal breath sounds  No stridor  No wheezing, rhonchi or rales  Chest:      Chest wall: No tenderness  Abdominal:      General: Bowel sounds are normal  There is no distension  Palpations: Abdomen is soft  There is no mass  Tenderness: There is no abdominal tenderness  There is no right CVA tenderness, left CVA tenderness, guarding or rebound  Hernia: No hernia is present  Comments:  overweight   Musculoskeletal: Normal range of motion  General: No swelling, tenderness, deformity or signs of injury  Right lower leg: No edema  Left lower leg: No edema  Lymphadenopathy:      Cervical: No cervical adenopathy  Skin:     General: Skin is warm and dry  Capillary Refill: Capillary refill takes less than 2 seconds  Coloration: Skin is not jaundiced or pale  Findings: No bruising, erythema, lesion or rash     Neurological:      General: No focal deficit present  Mental Status: He is alert and oriented to person, place, and time  Mental status is at baseline  Cranial Nerves: No cranial nerve deficit  Sensory: No sensory deficit  Motor: Weakness present  Coordination: Coordination normal       Gait: Gait normal       Deep Tendon Reflexes: Reflexes normal    Psychiatric:         Mood and Affect: Mood normal          Behavior: Behavior normal          Thought Content:  Thought content normal          Judgment: Judgment normal

## 2021-01-15 NOTE — ASSESSMENT & PLAN NOTE
Patient did have a lipid profile performed prior to the visit today and we did discuss the results of length with the patient  Patient has had a significant improvement in his total cholesterol level, dramatic decrease in his LDL and the HDL has been stable  Patient admits that he is not watching his diet and he still has a a slight elevation as non LDL cholesterol that puts him at higher risk heart disease  We have  Made a decision today to continue with present treatment and patient has had no side effects from the medication we will check another lipid profile in 6 months and make modification of treatment if needed depending on the results  We did reinforce with the patient the importance of watching his diet , limiting his intake fats and cholesterol with his diet, starting some type of routine exercise program which will be helpful to boost his HDL cholesterol

## 2021-01-15 NOTE — ASSESSMENT & PLAN NOTE
The patient will be returning to the office in 6 months for routine physical   He was given a slip for complete labs to be performed prior to the visit  He was told in the interim if any new medical  Concerns or issues to please call

## 2021-01-15 NOTE — ASSESSMENT & PLAN NOTE
We did have a discussion today about the patient's chronic low back pain  He states that it is manageable at this point time and the discomfort does come and go  He also relates occasionally he feels some weakness in the right leg but no difficulties with ambulation  On exam today the patient does have some some white weakness to the quadriceps on the right, weakness with dorsiflexion of his right foot  He does relate that he occasionally takes a nonsteroidal anti-inflammatory like Advil for any discomfort and he states this does help  We did reinforce the importance of exercise as instructed by physical therapy    He was told if any changes to please call

## 2021-01-22 DIAGNOSIS — E78.01 FAMILIAL HYPERCHOLESTEROLEMIA: ICD-10-CM

## 2021-01-23 RX ORDER — ROSUVASTATIN CALCIUM 5 MG/1
TABLET, COATED ORAL
Qty: 30 TABLET | Refills: 5 | Status: SHIPPED | OUTPATIENT
Start: 2021-01-23 | End: 2021-03-01

## 2021-02-27 DIAGNOSIS — E78.01 FAMILIAL HYPERCHOLESTEROLEMIA: ICD-10-CM

## 2021-03-01 RX ORDER — ROSUVASTATIN CALCIUM 5 MG/1
TABLET, COATED ORAL
Qty: 30 TABLET | Refills: 5 | Status: SHIPPED | OUTPATIENT
Start: 2021-03-01 | End: 2021-08-17

## 2021-04-05 DIAGNOSIS — Z23 ENCOUNTER FOR IMMUNIZATION: ICD-10-CM

## 2021-04-14 ENCOUNTER — IMMUNIZATIONS (OUTPATIENT)
Dept: FAMILY MEDICINE CLINIC | Facility: HOSPITAL | Age: 61
End: 2021-04-14

## 2021-04-14 DIAGNOSIS — Z23 ENCOUNTER FOR IMMUNIZATION: Primary | ICD-10-CM

## 2021-04-14 PROCEDURE — 91300 SARS-COV-2 / COVID-19 MRNA VACCINE (PFIZER-BIONTECH) 30 MCG: CPT

## 2021-04-14 PROCEDURE — 0001A SARS-COV-2 / COVID-19 MRNA VACCINE (PFIZER-BIONTECH) 30 MCG: CPT

## 2021-05-05 ENCOUNTER — IMMUNIZATIONS (OUTPATIENT)
Dept: FAMILY MEDICINE CLINIC | Facility: HOSPITAL | Age: 61
End: 2021-05-05

## 2021-05-05 DIAGNOSIS — Z23 ENCOUNTER FOR IMMUNIZATION: Primary | ICD-10-CM

## 2021-05-05 PROCEDURE — 0002A SARS-COV-2 / COVID-19 MRNA VACCINE (PFIZER-BIONTECH) 30 MCG: CPT

## 2021-05-05 PROCEDURE — 91300 SARS-COV-2 / COVID-19 MRNA VACCINE (PFIZER-BIONTECH) 30 MCG: CPT

## 2021-08-14 ENCOUNTER — APPOINTMENT (OUTPATIENT)
Dept: LAB | Facility: CLINIC | Age: 61
End: 2021-08-14
Payer: COMMERCIAL

## 2021-08-14 DIAGNOSIS — Z12.5 PROSTATE CANCER SCREENING: ICD-10-CM

## 2021-08-14 DIAGNOSIS — Z00.00 HEALTHCARE MAINTENANCE: ICD-10-CM

## 2021-08-14 DIAGNOSIS — E78.01 FAMILIAL HYPERCHOLESTEROLEMIA: ICD-10-CM

## 2021-08-14 LAB
ALBUMIN SERPL BCP-MCNC: 3.8 G/DL (ref 3.5–5)
ALP SERPL-CCNC: 63 U/L (ref 46–116)
ALT SERPL W P-5'-P-CCNC: 29 U/L (ref 12–78)
ANION GAP SERPL CALCULATED.3IONS-SCNC: 7 MMOL/L (ref 4–13)
AST SERPL W P-5'-P-CCNC: 17 U/L (ref 5–45)
BASOPHILS # BLD AUTO: 0.03 THOUSANDS/ΜL (ref 0–0.1)
BASOPHILS NFR BLD AUTO: 1 % (ref 0–1)
BILIRUB SERPL-MCNC: 0.43 MG/DL (ref 0.2–1)
BILIRUB UR QL STRIP: NEGATIVE
BUN SERPL-MCNC: 24 MG/DL (ref 5–25)
CALCIUM SERPL-MCNC: 8.6 MG/DL (ref 8.3–10.1)
CHLORIDE SERPL-SCNC: 102 MMOL/L (ref 100–108)
CHOLEST SERPL-MCNC: 199 MG/DL (ref 50–200)
CLARITY UR: CLEAR
CO2 SERPL-SCNC: 30 MMOL/L (ref 21–32)
COLOR UR: YELLOW
CREAT SERPL-MCNC: 1.08 MG/DL (ref 0.6–1.3)
EOSINOPHIL # BLD AUTO: 0.16 THOUSAND/ΜL (ref 0–0.61)
EOSINOPHIL NFR BLD AUTO: 3 % (ref 0–6)
ERYTHROCYTE [DISTWIDTH] IN BLOOD BY AUTOMATED COUNT: 13.2 % (ref 11.6–15.1)
GFR SERPL CREATININE-BSD FRML MDRD: 75 ML/MIN/1.73SQ M
GLUCOSE P FAST SERPL-MCNC: 92 MG/DL (ref 65–99)
GLUCOSE UR STRIP-MCNC: NEGATIVE MG/DL
HCT VFR BLD AUTO: 46.2 % (ref 36.5–49.3)
HDLC SERPL-MCNC: 48 MG/DL
HGB BLD-MCNC: 15 G/DL (ref 12–17)
HGB UR QL STRIP.AUTO: NEGATIVE
IMM GRANULOCYTES # BLD AUTO: 0.02 THOUSAND/UL (ref 0–0.2)
IMM GRANULOCYTES NFR BLD AUTO: 0 % (ref 0–2)
KETONES UR STRIP-MCNC: NEGATIVE MG/DL
LDLC SERPL CALC-MCNC: 132 MG/DL (ref 0–100)
LEUKOCYTE ESTERASE UR QL STRIP: NEGATIVE
LYMPHOCYTES # BLD AUTO: 1.31 THOUSANDS/ΜL (ref 0.6–4.47)
LYMPHOCYTES NFR BLD AUTO: 21 % (ref 14–44)
MCH RBC QN AUTO: 29.3 PG (ref 26.8–34.3)
MCHC RBC AUTO-ENTMCNC: 32.5 G/DL (ref 31.4–37.4)
MCV RBC AUTO: 90 FL (ref 82–98)
MONOCYTES # BLD AUTO: 0.53 THOUSAND/ΜL (ref 0.17–1.22)
MONOCYTES NFR BLD AUTO: 8 % (ref 4–12)
NEUTROPHILS # BLD AUTO: 4.28 THOUSANDS/ΜL (ref 1.85–7.62)
NEUTS SEG NFR BLD AUTO: 67 % (ref 43–75)
NITRITE UR QL STRIP: NEGATIVE
NONHDLC SERPL-MCNC: 151 MG/DL
NRBC BLD AUTO-RTO: 0 /100 WBCS
PH UR STRIP.AUTO: 6 [PH]
PLATELET # BLD AUTO: 292 THOUSANDS/UL (ref 149–390)
PMV BLD AUTO: 9.7 FL (ref 8.9–12.7)
POTASSIUM SERPL-SCNC: 4 MMOL/L (ref 3.5–5.3)
PROT SERPL-MCNC: 6.9 G/DL (ref 6.4–8.2)
PROT UR STRIP-MCNC: NEGATIVE MG/DL
PSA SERPL-MCNC: 2.6 NG/ML (ref 0–4)
RBC # BLD AUTO: 5.12 MILLION/UL (ref 3.88–5.62)
SODIUM SERPL-SCNC: 139 MMOL/L (ref 136–145)
SP GR UR STRIP.AUTO: 1.02 (ref 1–1.03)
TRIGL SERPL-MCNC: 97 MG/DL
UROBILINOGEN UR QL STRIP.AUTO: 0.2 E.U./DL
WBC # BLD AUTO: 6.33 THOUSAND/UL (ref 4.31–10.16)

## 2021-08-14 PROCEDURE — 80061 LIPID PANEL: CPT

## 2021-08-14 PROCEDURE — G0103 PSA SCREENING: HCPCS

## 2021-08-14 PROCEDURE — 80053 COMPREHEN METABOLIC PANEL: CPT

## 2021-08-14 PROCEDURE — 81003 URINALYSIS AUTO W/O SCOPE: CPT

## 2021-08-14 PROCEDURE — 36415 COLL VENOUS BLD VENIPUNCTURE: CPT

## 2021-08-14 PROCEDURE — 85025 COMPLETE CBC W/AUTO DIFF WBC: CPT

## 2021-08-15 ENCOUNTER — APPOINTMENT (OUTPATIENT)
Dept: LAB | Facility: CLINIC | Age: 61
End: 2021-08-15
Payer: COMMERCIAL

## 2021-08-15 DIAGNOSIS — Z12.11 COLON CANCER SCREENING: ICD-10-CM

## 2021-08-15 LAB — HEMOCCULT STL QL IA: NEGATIVE

## 2021-08-15 PROCEDURE — G0328 FECAL BLOOD SCRN IMMUNOASSAY: HCPCS

## 2021-08-17 ENCOUNTER — OFFICE VISIT (OUTPATIENT)
Dept: INTERNAL MEDICINE CLINIC | Facility: CLINIC | Age: 61
End: 2021-08-17
Payer: COMMERCIAL

## 2021-08-17 VITALS
SYSTOLIC BLOOD PRESSURE: 120 MMHG | BODY MASS INDEX: 29.63 KG/M2 | RESPIRATION RATE: 16 BRPM | DIASTOLIC BLOOD PRESSURE: 82 MMHG | HEIGHT: 70 IN | HEART RATE: 72 BPM | OXYGEN SATURATION: 98 % | WEIGHT: 207 LBS

## 2021-08-17 DIAGNOSIS — M54.41 CHRONIC RIGHT-SIDED LOW BACK PAIN WITH RIGHT-SIDED SCIATICA: ICD-10-CM

## 2021-08-17 DIAGNOSIS — E66.3 OVERWEIGHT: ICD-10-CM

## 2021-08-17 DIAGNOSIS — E78.01 FAMILIAL HYPERCHOLESTEROLEMIA: Primary | ICD-10-CM

## 2021-08-17 DIAGNOSIS — Z00.00 HEALTHCARE MAINTENANCE: ICD-10-CM

## 2021-08-17 DIAGNOSIS — G89.29 CHRONIC RIGHT-SIDED LOW BACK PAIN WITH RIGHT-SIDED SCIATICA: ICD-10-CM

## 2021-08-17 DIAGNOSIS — G47.33 OSA (OBSTRUCTIVE SLEEP APNEA): ICD-10-CM

## 2021-08-17 DIAGNOSIS — H91.93 DECREASED HEARING OF BOTH EARS: ICD-10-CM

## 2021-08-17 PROCEDURE — 3725F SCREEN DEPRESSION PERFORMED: CPT | Performed by: INTERNAL MEDICINE

## 2021-08-17 PROCEDURE — 99396 PREV VISIT EST AGE 40-64: CPT | Performed by: INTERNAL MEDICINE

## 2021-08-17 PROCEDURE — 1036F TOBACCO NON-USER: CPT | Performed by: INTERNAL MEDICINE

## 2021-08-17 PROCEDURE — 3008F BODY MASS INDEX DOCD: CPT | Performed by: INTERNAL MEDICINE

## 2021-08-17 RX ORDER — ROSUVASTATIN CALCIUM 10 MG/1
10 TABLET, COATED ORAL DAILY
Qty: 90 TABLET | Refills: 3 | Status: SHIPPED | OUTPATIENT
Start: 2021-08-17 | End: 2022-06-20 | Stop reason: SDUPTHER

## 2021-08-17 NOTE — ASSESSMENT & PLAN NOTE
Patient has been diagnosed with mild obstructive sleep apnea  Was suggested that he go for in in lab further evaluation and treatment but he is for gone having this performed  He was told if any changes this would be of benefit in the long run with treatment

## 2021-08-17 NOTE — PROGRESS NOTES
Assessment/Plan:    Healthcare maintenance   Patient is here today for routine physical, history of hyperlipidemia, decreased auditory acuity bilaterally with hearing aids, chronic right-sided low back pain with sciatic the past now stable  Patient did have extensive lab testing performed prior to the visit today we did discuss the results  Patient states in general he is doing well  Has started her routine exercise program is walking almost on a daily basis with his wife  He states his back is doing well with no acute exacerbations of his discomfort  Patient did undergo full physical exam today in the office finding no new abnormalities of significance  Patient will continue with present treatment other than increasing his Crestor to 10 mg daily are elevated cholesterol reading  Patient be seen in the office approximately 6 months and we will repeat a lipid profile with that visit  He was told in the interim if any new medical problems or concerns to please call    Chronic right-sided low back pain with sciatica    States that he has been working on the exercises as per physical therapy  Trying to work on his core strength  No acute exacerbations recently his back pain  No significant weakness in lower extremities  Decreased hearing of both ears    Patient now has hearing aids bilaterally  States states this has made a significant difference    Familial hypercholesterolemia   Patient remains on Crestor 5 mg daily  Cholesterol is still not to goal   He states he is trying to watch his intake of fats and cholesterol with his diet  Decision today was to increase his Crestor to 10 mg daily  Check another lipid profile in 6 months  KEVIN (obstructive sleep apnea)    Patient has been diagnosed with mild obstructive sleep apnea  Was suggested that he go for in in lab further evaluation and treatment but he is for gone having this performed    He was told if any changes this would be of benefit in the long run with treatment  Overweight    With the patient's BMI he is considered overweight  He states he is trying to look closely at his diet and reduce his caloric intake in order to help with weight loss and both he and his wife have started on her routine exercise program walking almost on daily basis again to help with weight loss  Diagnoses and all orders for this visit:    Familial hypercholesterolemia  -     rosuvastatin (CRESTOR) 10 MG tablet; Take 1 tablet (10 mg total) by mouth daily  -     Lipid panel; Future    Healthcare maintenance    Chronic right-sided low back pain with right-sided sciatica    Decreased hearing of both ears    KEVIN (obstructive sleep apnea)    Overweight          Subjective:      Patient ID: Alma Tang is a 62 y o  male  60-year-old male history of hyperlipidemia, overweight,  Chronic low back pain with sciatica on the right  Patient is here today for physical   Did have extensive lab testing performed prior to the visit today we did discuss the results  Patient states in general he is doing well  Is now fitted with bilateral hearing aids which he states has been helpful  Presents with no new complaints or concerns      The following portions of the patient's history were reviewed and updated as appropriate:   He  has no past medical history on file  He   Patient Active Problem List    Diagnosis Date Noted    Sensorineural hearing loss (SNHL) of both ears 12/08/2020    Familial hypercholesterolemia 08/14/2020    Decreased hearing of both ears 08/14/2020    Chronic right-sided low back pain with sciatica 12/05/2019    Fatigue 12/05/2018    Overweight 12/05/2018    Postnasal drip 12/05/2018    Mood disturbance 12/05/2018    KEVIN (obstructive sleep apnea)     Snoring     Daytime sleepiness     Left groin mass 06/26/2018    Healthcare maintenance 06/26/2018     He  has no past surgical history on file  His family history is not on file    He  reports that he has never smoked  He has never used smokeless tobacco  He reports current alcohol use  He reports that he does not use drugs  Current Outpatient Medications   Medication Sig Dispense Refill    rosuvastatin (CRESTOR) 10 MG tablet Take 1 tablet (10 mg total) by mouth daily 90 tablet 3     No current facility-administered medications for this visit  Current Outpatient Medications on File Prior to Visit   Medication Sig    [DISCONTINUED] rosuvastatin (CRESTOR) 5 mg tablet TAKE ONE TABLET BY MOUTH EVERY DAY     No current facility-administered medications on file prior to visit  He has No Known Allergies       Review of Systems   Constitutional: Positive for activity change ( asPatient has been starting on a routine exercise program to promote weight loss)  Negative for appetite change, chills, diaphoresis, fatigue, fever and unexpected weight change  HENT: Negative  Eyes: Negative  Respiratory: Negative  Wife states that he is still snoring   Cardiovascular: Negative  Gastrointestinal: Negative  Endocrine: Negative  Genitourinary: Negative  Musculoskeletal: Negative  Skin: Negative  Allergic/Immunologic: Negative  Neurological: Negative  Hematological: Negative  Psychiatric/Behavioral: Negative  Objective:      /82   Pulse 72   Resp 16   Ht 5' 10" (1 778 m)   Wt 93 9 kg (207 lb)   SpO2 98%   BMI 29 70 kg/m²          Physical Exam  Vitals and nursing note reviewed  Constitutional:       General: He is not in acute distress  Appearance: Normal appearance  He is not ill-appearing, toxic-appearing or diaphoretic  Comments: Pleasant overweight 15-year-old male who is awake alert no acute distress and oriented x3   HENT:      Head: Normocephalic and atraumatic  Right Ear: Tympanic membrane, ear canal and external ear normal  There is no impacted cerumen        Left Ear: Tympanic membrane, ear canal and external ear normal  There is no impacted cerumen  Ears:      Comments: Bilateral hearing aids     Nose: Nose normal  No congestion or rhinorrhea  Mouth/Throat:      Mouth: Mucous membranes are moist       Pharynx: Oropharynx is clear  No oropharyngeal exudate or posterior oropharyngeal erythema  Eyes:      General: No scleral icterus  Right eye: No discharge  Left eye: No discharge  Extraocular Movements: Extraocular movements intact  Conjunctiva/sclera: Conjunctivae normal       Pupils: Pupils are equal, round, and reactive to light  Neck:      Vascular: No carotid bruit  Cardiovascular:      Rate and Rhythm: Normal rate and regular rhythm  Pulses: Normal pulses  Heart sounds: Normal heart sounds  No murmur heard  No friction rub  No gallop  Pulmonary:      Effort: Pulmonary effort is normal  No respiratory distress  Breath sounds: Normal breath sounds  No stridor  No wheezing, rhonchi or rales  Chest:      Chest wall: No tenderness  Abdominal:      General: Bowel sounds are normal  There is no distension  Palpations: Abdomen is soft  There is no mass  Tenderness: There is no abdominal tenderness  There is no right CVA tenderness, left CVA tenderness, guarding or rebound  Hernia: No hernia is present  Comments: Overweight   Genitourinary:     Penis: Normal        Testes: Normal       Rectum: Normal  Guaiac result negative  Comments: Mildly enlarged prostate, soft nontender and no nodules or masses  Musculoskeletal:         General: Deformity ( some flatteningTo lumbar curve slightly decreased range of motion both actively and passively to lumbar spine but no pain with movement) present  No swelling, tenderness or signs of injury  Cervical back: Normal range of motion and neck supple  No rigidity or tenderness  Right lower leg: No edema  Left lower leg: Edema ( trace edema to lower extremity) present     Lymphadenopathy:      Cervical: No cervical adenopathy  Skin:     General: Skin is warm and dry  Capillary Refill: Capillary refill takes less than 2 seconds  Coloration: Skin is not jaundiced or pale  Findings: No bruising, erythema, lesion or rash  Neurological:      General: No focal deficit present  Mental Status: He is alert and oriented to person, place, and time  Mental status is at baseline  Cranial Nerves: No cranial nerve deficit  Sensory: No sensory deficit  Motor: No weakness  Coordination: Coordination normal       Gait: Gait normal       Deep Tendon Reflexes: Reflexes normal    Psychiatric:         Mood and Affect: Mood normal          Behavior: Behavior normal          Thought Content: Thought content normal          Judgment: Judgment normal          BMI Counseling: Body mass index is 29 7 kg/m²  The BMI is above normal  Nutrition recommendations include reducing portion sizes, decreasing overall calorie intake, 3-5 servings of fruits/vegetables daily, consuming healthier snacks, moderation in carbohydrate intake, increasing intake of lean protein, reducing intake of saturated fat and trans fat and reducing intake of cholesterol  Exercise recommendations include exercising 3-5 times per week

## 2021-08-17 NOTE — ASSESSMENT & PLAN NOTE
States that he has been working on the exercises as per physical therapy  Trying to work on his core strength  No acute exacerbations recently his back pain  No significant weakness in lower extremities

## 2021-08-17 NOTE — ASSESSMENT & PLAN NOTE
Patient remains on Crestor 5 mg daily  Cholesterol is still not to goal   He states he is trying to watch his intake of fats and cholesterol with his diet  Decision today was to increase his Crestor to 10 mg daily  Check another lipid profile in 6 months

## 2021-08-17 NOTE — PATIENT INSTRUCTIONS
Heart Healthy Diet   AMBULATORY CARE:   A heart healthy diet  is an eating plan low in unhealthy fats and sodium (salt)  The plan is high in healthy fats and fiber  A heart healthy diet helps improve your cholesterol levels and lowers your risk for heart disease and stroke  A dietitian will teach you how to read and understand food labels  Heart healthy diet guidelines to follow:   · Choose foods that contain healthy fats  ? Unsaturated fats  include monounsaturated and polyunsaturated fats  Unsaturated fat is found in foods such as soybean, canola, olive, corn, and safflower oils  It is also found in soft tub margarine that is made with liquid vegetable oil  ? Omega-3 fat  is found in certain fish, such as salmon, tuna, and trout, and in walnuts and flaxseed  Eat fish high in omega-3 fats at least 2 times a week  · Get 20 to 30 grams of fiber each day  Fruits, vegetables, whole-grain foods, and legumes (cooked beans) are good sources of fiber  · Limit or do not have unhealthy fats  ? Cholesterol  is found in animal foods, such as eggs and lobster, and in dairy products made from whole milk  Limit cholesterol to less than 200 mg each day  ? Saturated fat  is found in meats, such as bejarano and hamburger  It is also found in chicken or turkey skin, whole milk, and butter  Limit saturated fat to less than 7% of your total daily calories  ? Trans fat  is found in packaged foods, such as potato chips and cookies  It is also in hard margarine, some fried foods, and shortening  Do not eat foods that contain trans fats  · Limit sodium as directed  You may be told to limit sodium to 2,000 to 2,300 mg each day  Choose low-sodium or no-salt-added foods  Add little or no salt to food you prepare  Use herbs and spices in place of salt         Include the following in your heart healthy plan:  Ask your dietitian or healthcare provider how many servings to have from each of the following food groups:  · Grains:      ? Whole-wheat breads, cereals, and pastas, and brown rice    ? Low-fat, low-sodium crackers and chips    · Vegetables:      ? Broccoli, green beans, green peas, and spinach    ? Collards, kale, and lima beans    ? Carrots, sweet potatoes, tomatoes, and peppers    ? Canned vegetables with no salt added    · Fruits:      ? Bananas, peaches, pears, and pineapple    ? Grapes, raisins, and dates    ? Oranges, tangerines, grapefruit, orange juice, and grapefruit juice    ? Apricots, mangoes, melons, and papaya    ? Raspberries and strawberries    ? Canned fruit with no added sugar    · Low-fat dairy:      ? Nonfat (skim) milk, 1% milk, and low-fat almond, cashew, or soy milks fortified with calcium    ? Low-fat cheese, regular or frozen yogurt, and cottage cheese    · Meats and proteins:      ? Lean cuts of beef and pork (loin, leg, round), skinless chicken and turkey    ? Legumes, soy products, egg whites, or nuts    Limit or do not include the following in your heart healthy plan:   · Unhealthy fats and oils:      ? Whole or 2% milk, cream cheese, sour cream, or cheese    ? High-fat cuts of beef (T-bone steaks, ribs), chicken or turkey with skin, and organ meats such as liver    ? Butter, stick margarine, shortening, and cooking oils such as coconut or palm oil    · Foods and liquids high in sodium:      ? Packaged foods, such as frozen dinners, cookies, macaroni and cheese, and cereals with more than 300 mg of sodium per serving    ? Vegetables with added sodium, such as instant potatoes, vegetables with added sauces, or regular canned vegetables    ? Cured or smoked meats, such as hot dogs, bejarano, and sausage    ? High-sodium ketchup, barbecue sauce, salad dressing, pickles, olives, soy sauce, or miso    · Foods and liquids high in sugar:      ? Candy, cake, cookies, pies, or doughnuts    ? Soft drinks (soda), sports drinks, or sweetened tea    ?  Canned or dry mixes for cakes, soups, sauces, or gravies    Other healthy heart guidelines:   · Do not smoke  Nicotine and other chemicals in cigarettes and cigars can cause lung and heart damage  Ask your healthcare provider for information if you currently smoke and need help to quit  E-cigarettes or smokeless tobacco still contain nicotine  Talk to your healthcare provider before you use these products  · Limit or do not drink alcohol as directed  Alcohol can damage your heart and raise your blood pressure  Your healthcare provider may give you specific daily and weekly limits  The general recommended limit is 1 drink a day for women 21 or older and for men 72 or older  Do not have more than 3 drinks in a day or 7 in a week  The recommended limit is 2 drinks a day for men 24to 59years of age  Do not have more than 4 drinks in a day or 14 in a week  A drink of alcohol is 12 ounces of beer, 5 ounces of wine, or 1½ ounces of liquor  · Exercise regularly  Exercise can help you maintain a healthy weight and improve your blood pressure and cholesterol levels  Regular exercise can also decrease your risk for heart problems  Ask your healthcare provider about the best exercise plan for you  Do not start an exercise program without asking your healthcare provider  Follow up with your doctor or cardiologist as directed:  Write down your questions so you remember to ask them during your visits  © [employer] 2021 Information is for End User's use only and may not be sold, redistributed or otherwise used for commercial purposes  All illustrations and images included in CareNotes® are the copyrighted property of A D A M , Inc  or Mary Jo Colon   The above information is an  only  It is not intended as medical advice for individual conditions or treatments  Talk to your doctor, nurse or pharmacist before following any medical regimen to see if it is safe and effective for you      Calorie Counting Diet   WHAT YOU NEED TO KNOW:   What is a calorie counting diet? It is a meal plan based on counting calories each day to reach a healthy body weight  You will need to eat fewer calories if you are trying to lose weight  Weight loss may decrease your risk for certain health problems or improve your health if you have health problems  Some of these health problems include heart disease, high blood pressure, and diabetes  What foods should I avoid? Your dietitian will tell you if you need to avoid certain foods based on your body weight and health condition  You may need to avoid high-fat foods if you are at risk for or have heart disease  You may need to eat fewer foods from the breads and starches food group if you have diabetes  How many calories are in foods? The following is a list of foods and drinks with the approximate number of calories in each  Check the food label to find the exact number of calories  A dietitian can tell you how many calories you should have from each food group each day  · Carbohydrate:      ? ½ of a 3-inch bagel, 1 slice of bread, or ½ of a hamburger bun or hot dog bun (80)    ? 1 (8-inch) flour tortilla or ½ cup of cooked rice (100)    ? 1 (6-inch) corn tortilla (80)    ? 1 (6-inch) pancake or 1 cup of bran flakes cereal (110)    ? ½ cup of cooked cereal (80)    ? ½ cup of cooked pasta (85)    ? 1 ounce of pretzels (100)    ? 3 cups of air-popped popcorn without butter or oil (80)    · Dairy:      ? 1 cup of skim or 1% milk (90)    ? 1 cup of 2% milk (120)    ? 1 cup of whole milk (160)    ? 1 cup of 2% chocolate milk (220)    ? 1 ounce of low-fat cheese with 3 grams of fat per ounce (70)    ? 1 ounce of cheddar cheese (114)    ? ½ cup of 1% fat cottage cheese (80)    ? 1 cup of plain or sugar-free, fat-free yogurt (90)    · Protein foods:      ? 3 ounces of fish (not breaded or fried) (95)    ? 3 ounces of breaded, fried fish (195)    ? ¾ cup of tuna canned in water (105)    ?  3 ounces of chicken breast without skin (105)    ? 1 fried chicken breast with skin (350)    ? ¼ cup of fat free egg substitute (40)    ? 1 large egg (75)    ? 3 ounces of lean beef or pork (165)    ? 3 ounces of fried pork chop or ham (185)    ? ½ cup of cooked dried beans, such as kidney, salcido, lentils, or navy (115)    ? 3 ounces of bologna or lunch meat (225)    ? 2 links of breakfast sausage (140)    · Vegetables:      ? ½ cup of sliced mushrooms (10)    ? 1 cup of salad greens, such as lettuce, spinach, or alma (15)    ? ½ cup of steamed asparagus (20)    ? ½ cup of cooked summer squash, zucchini squash, or green or wax beans (25)    ? 1 cup of broccoli or cauliflower florets, or 1 medium tomato (25)    ? 1 large raw carrot or ½ cup of cooked carrots (40)    ? ? of a medium cucumber or 1 stalk of celery (5)    ? 1 small baked potato (160)    ? 1 cup of breaded, fried vegetables (230)    · Fruit:      ? 1 (6-inch) banana (55)     ? ½ of a 4-inch grapefruit (55)    ? 15 grapes (60)    ? 1 medium orange or apple (70)    ? 1 large peach (65)    ? 1 cup of fresh pineapple chunks (75)    ? 1 cup of melon cubes (50)    ? 1¼ cups of whole strawberries (45)    ? ½ cup of fruit canned in juice (55)    ? ½ cup of fruit canned in heavy syrup (110)    ? ? cup of raisins (130)    ? ½ cup of unsweetened fruit juice (60)    ? ½ cup of grape, cranberry, or prune juice (90)    · Fat:      ? 10 peanuts or 2 teaspoons of peanut butter (55)    ? 2 tablespoons of avocado or 1 tablespoon of regular salad dressing (45)    ? 2 slices of bejarano (90)    ? 1 teaspoon of oil, such as safflower, canola, corn, or olive oil (45)    ? 2 teaspoons of low-fat margarine, or 1 tablespoon of low-fat mayonnaise (50)    ? 1 teaspoon of regular margarine (40)    ? 1 tablespoon of regular mayonnaise (135)    ? 1 tablespoon of cream cheese or 2 tablespoons of low-fat cream cheese (45)    ?  2 tablespoons of vegetable shortening (215)    · Dessert and sweets:      ? 8 animal crackers or 5 vanilla wafers (80)    ? 1 frozen fruit juice bar (80)    ? ½ cup of ice milk or low-fat frozen yogurt (90)    ? ½ cup of sherbet or sorbet (125)    ? ½ cup of sugar-free pudding or custard (60)    ? ½ cup of ice cream (140)    ? ½ cup of pudding or custard (175)    ? 1 (2-inch) square chocolate brownie (185)    · Combination foods:      ? Bean burrito made with an 8-inch tortilla, without cheese (275)    ? Chicken breast sandwich with lettuce and tomato (325)    ? 1 cup of chicken noodle soup (60)    ? 1 beef taco (175)    ? Regular hamburger with lettuce and tomato (310)    ? Regular cheeseburger with lettuce and tomato (410)     ? ¼ of a 12-inch cheese pizza (280)    ? Fried fish sandwich with lettuce and tomato (425)    ? Hot dog and bun (275)    ? 1½ cups of macaroni and cheese (310)    ? Taco salad with a fried tortilla shell (870)    · Low-calorie foods:      ? 1 tablespoon of ketchup or 1 tablespoon of fat free sour cream (15)    ? 1 teaspoon of mustard (5)    ? ¼ cup of salsa (20)    ? 1 large dill pickle (15)    ? 1 tablespoon of fat free salad dressing (10)    ? 2 teaspoons of low-sugar, light jam or jelly, or 1 tablespoon of sugar-free syrup (15)    ? 1 sugar-free popsicle (15)    ? 1 cup of club soda, seltzer water, or diet soda (0)    CARE AGREEMENT:   You have the right to help plan your care  Discuss treatment options with your healthcare provider to decide what care you want to receive  You always have the right to refuse treatment  The above information is an  only  It is not intended as medical advice for individual conditions or treatments  Talk to your doctor, nurse or pharmacist before following any medical regimen to see if it is safe and effective for you  © Copyright [employer] 2021 Information is for End User's use only and may not be sold, redistributed or otherwise used for commercial purposes   All illustrations and images included in [facility] are the copyrighted property of A D A M , Inc  or [address] [place], [place] [address] [facility] [address]

## 2021-08-17 NOTE — ASSESSMENT & PLAN NOTE
With the patient's BMI he is considered overweight  He states he is trying to look closely at his diet and reduce his caloric intake in order to help with weight loss and both he and his wife have started on her routine exercise program walking almost on daily basis again to help with weight loss

## 2021-08-17 NOTE — ASSESSMENT & PLAN NOTE
Patient is here today for routine physical, history of hyperlipidemia, decreased auditory acuity bilaterally with hearing aids, chronic right-sided low back pain with sciatic the past now stable  Patient did have extensive lab testing performed prior to the visit today we did discuss the results  Patient states in general he is doing well  Has started her routine exercise program is walking almost on a daily basis with his wife  He states his back is doing well with no acute exacerbations of his discomfort  Patient did undergo full physical exam today in the office finding no new abnormalities of significance  Patient will continue with present treatment other than increasing his Crestor to 10 mg daily are elevated cholesterol reading  Patient be seen in the office approximately 6 months and we will repeat a lipid profile with that visit    He was told in the interim if any new medical problems or concerns to please call

## 2022-01-24 ENCOUNTER — IMMUNIZATIONS (OUTPATIENT)
Dept: FAMILY MEDICINE CLINIC | Facility: HOSPITAL | Age: 62
End: 2022-01-24

## 2022-01-24 DIAGNOSIS — Z23 ENCOUNTER FOR IMMUNIZATION: Primary | ICD-10-CM

## 2022-01-24 PROCEDURE — 0001A COVID-19 PFIZER VACC 0.3 ML: CPT

## 2022-01-24 PROCEDURE — 91300 COVID-19 PFIZER VACC 0.3 ML: CPT

## 2022-02-13 ENCOUNTER — APPOINTMENT (OUTPATIENT)
Dept: LAB | Facility: CLINIC | Age: 62
End: 2022-02-13
Payer: COMMERCIAL

## 2022-02-13 DIAGNOSIS — E78.01 FAMILIAL HYPERCHOLESTEROLEMIA: ICD-10-CM

## 2022-02-13 LAB
CHOLEST SERPL-MCNC: 196 MG/DL
HDLC SERPL-MCNC: 56 MG/DL
LDLC SERPL CALC-MCNC: 123 MG/DL (ref 0–100)
NONHDLC SERPL-MCNC: 140 MG/DL
TRIGL SERPL-MCNC: 83 MG/DL

## 2022-02-13 PROCEDURE — 80061 LIPID PANEL: CPT

## 2022-02-13 PROCEDURE — 36415 COLL VENOUS BLD VENIPUNCTURE: CPT

## 2022-02-18 ENCOUNTER — OFFICE VISIT (OUTPATIENT)
Dept: INTERNAL MEDICINE CLINIC | Facility: CLINIC | Age: 62
End: 2022-02-18
Payer: COMMERCIAL

## 2022-02-18 VITALS
BODY MASS INDEX: 29.01 KG/M2 | HEART RATE: 80 BPM | RESPIRATION RATE: 16 BRPM | SYSTOLIC BLOOD PRESSURE: 114 MMHG | DIASTOLIC BLOOD PRESSURE: 62 MMHG | HEIGHT: 70 IN | OXYGEN SATURATION: 98 % | WEIGHT: 202.6 LBS | TEMPERATURE: 96.9 F

## 2022-02-18 DIAGNOSIS — G89.29 CHRONIC RIGHT-SIDED LOW BACK PAIN WITH RIGHT-SIDED SCIATICA: Primary | ICD-10-CM

## 2022-02-18 DIAGNOSIS — G47.33 OSA (OBSTRUCTIVE SLEEP APNEA): ICD-10-CM

## 2022-02-18 DIAGNOSIS — M54.41 CHRONIC RIGHT-SIDED LOW BACK PAIN WITH RIGHT-SIDED SCIATICA: Primary | ICD-10-CM

## 2022-02-18 DIAGNOSIS — E78.01 FAMILIAL HYPERCHOLESTEROLEMIA: ICD-10-CM

## 2022-02-18 DIAGNOSIS — Z00.00 HEALTHCARE MAINTENANCE: ICD-10-CM

## 2022-02-18 DIAGNOSIS — H90.3 SENSORINEURAL HEARING LOSS (SNHL) OF BOTH EARS: ICD-10-CM

## 2022-02-18 DIAGNOSIS — E66.3 OVERWEIGHT: ICD-10-CM

## 2022-02-18 PROCEDURE — 1036F TOBACCO NON-USER: CPT | Performed by: INTERNAL MEDICINE

## 2022-02-18 PROCEDURE — 99214 OFFICE O/P EST MOD 30 MIN: CPT | Performed by: INTERNAL MEDICINE

## 2022-02-18 NOTE — ASSESSMENT & PLAN NOTE
The patient is now fitted with hearing aids and he states that they have helped a dramatically with his hearing and ability to communicate  He is pleased with the results

## 2022-02-18 NOTE — ASSESSMENT & PLAN NOTE
Patient has not followed up with his sleep therapist   He does understand the importance of this especially with his problems with daytime fatigue about snoring  He has made some head road as far as weight is concerned losing 5 lb    The he understands he would need to have a in-lab sleep study

## 2022-02-18 NOTE — ASSESSMENT & PLAN NOTE
The with the patient's BMI he is considered overweight  Has lost approximately 5 lb since his last visit and he is commended on his ability to lose weight but he was told he has a way to go before he is back to a normal weight  The we did suggest decreasing his caloric intake and getting more aerobic activity

## 2022-02-18 NOTE — ASSESSMENT & PLAN NOTE
Patient still is having some intermittent back pain and discomfort  States he has not been doing the exercises as prescribed by physical therapy and he does understand the importance of this  If the patient has increasing pain discomfort would most likely send him back for physical therapy re-evaluation and treatment

## 2022-02-18 NOTE — PROGRESS NOTES
Assessment/Plan:    Familial hypercholesterolemia  Patient did have a lipid profile performed prior to the visit today  His total cholesterol is stable but he does have some increase in his LDL cholesterol any states he is not as physically active at this point time nor watching his diet as closely as he should  The again did discuss with the patient the importance of limiting fats and cholesterol as much as possible from the diet and trying to get back to some type of routine exercise which will help boost the HDL and hopefully lower the LDL  Check another lipid profile in 6 months  Chronic right-sided low back pain with sciatica  Patient still is having some intermittent back pain and discomfort  States he has not been doing the exercises as prescribed by physical therapy and he does understand the importance of this  If the patient has increasing pain discomfort would most likely send him back for physical therapy re-evaluation and treatment  Sensorineural hearing loss (SNHL) of both ears  The patient is now fitted with hearing aids and he states that they have helped a dramatically with his hearing and ability to communicate  He is pleased with the results  KEVIN (obstructive sleep apnea)  Patient has not followed up with his sleep therapist   He does understand the importance of this especially with his problems with daytime fatigue about snoring  He has made some head road as far as weight is concerned losing 5 lb  The he understands he would need to have a in-lab sleep study    Overweight  The with the patient's BMI he is considered overweight  Has lost approximately 5 lb since his last visit and he is commended on his ability to lose weight but he was told he has a way to go before he is back to a normal weight  The we did suggest decreasing his caloric intake and getting more aerobic activity         Diagnoses and all orders for this visit:    Chronic right-sided low back pain with right-sided sciatica    Familial hypercholesterolemia    Healthcare maintenance  -     Comprehensive metabolic panel; Future  -     CBC and differential; Future  -     Lipid panel; Future  -     UA (URINE) with reflex to Scope; Future  -     PSA, Total Screen; Future  -     Occult Blood, Fecal Immunochemical; Future    Sensorineural hearing loss (SNHL) of both ears    KEVIN (obstructive sleep apnea)    Overweight          Subjective:      Patient ID: Vera Watson is a 62 y o  male  Patient is a 70-year-old male history of medical problems as outlined previously who is here today for follow-up after 6 month period of time  He did have labs performed prior to the visit today specifically lipid profile and we did discuss the results  Patient states in general he is doing well but he still has problems with fatigue during the day and has not gone for complete sleep studies as of yet  He states he also is having his chronic low back pain and discomfort but not as severe as in the past       The following portions of the patient's history were reviewed and updated as appropriate:   He  has no past medical history on file  He   Patient Active Problem List    Diagnosis Date Noted    Sensorineural hearing loss (SNHL) of both ears 12/08/2020    Familial hypercholesterolemia 08/14/2020    Decreased hearing of both ears 08/14/2020    Chronic right-sided low back pain with sciatica 12/05/2019    Fatigue 12/05/2018    Overweight 12/05/2018    Postnasal drip 12/05/2018    Mood disturbance 12/05/2018    KEVIN (obstructive sleep apnea)     Snoring     Daytime sleepiness     Left groin mass 06/26/2018    Healthcare maintenance 06/26/2018     He  has no past surgical history on file  His family history is not on file  He  reports that he has never smoked  He has never used smokeless tobacco  He reports current alcohol use  He reports that he does not use drugs    Current Outpatient Medications   Medication Sig Dispense Refill    rosuvastatin (CRESTOR) 10 MG tablet Take 1 tablet (10 mg total) by mouth daily 90 tablet 3     No current facility-administered medications for this visit  Current Outpatient Medications on File Prior to Visit   Medication Sig    rosuvastatin (CRESTOR) 10 MG tablet Take 1 tablet (10 mg total) by mouth daily     No current facility-administered medications on file prior to visit  He has No Known Allergies       Review of Systems   Constitutional: Positive for fatigue  Negative for activity change, appetite change, chills, diaphoresis, fever and unexpected weight change  HENT: Negative  Eyes: Negative  Respiratory: Negative  Cardiovascular: Negative  Gastrointestinal: Negative  Endocrine: Negative  Genitourinary: Negative  Musculoskeletal: Positive for back pain  Negative for arthralgias, gait problem, joint swelling, myalgias, neck pain and neck stiffness  Skin: Negative  Allergic/Immunologic: Negative  Neurological: Negative  Hematological: Negative  Psychiatric/Behavioral: Negative  Objective:      /62   Pulse 80   Temp (!) 96 9 °F (36 1 °C)   Resp 16   Ht 5' 10" (1 778 m)   Wt 91 9 kg (202 lb 9 6 oz)   SpO2 98%   BMI 29 07 kg/m²          Physical Exam  Vitals and nursing note reviewed  Constitutional:       General: He is not in acute distress  Appearance: Normal appearance  He is not ill-appearing, toxic-appearing or diaphoretic  Comments: A pleasant, overweight 81-year-old male who is awake alert in no acute distress and oriented x3   HENT:      Head: Normocephalic and atraumatic  Right Ear: Tympanic membrane, ear canal and external ear normal  There is no impacted cerumen  Left Ear: Tympanic membrane, ear canal and external ear normal  There is no impacted cerumen  Ears:      Comments: Bilateral hearing aids     Nose: Nose normal  No congestion or rhinorrhea        Mouth/Throat:      Mouth: Mucous membranes are moist       Pharynx: Oropharynx is clear  No oropharyngeal exudate or posterior oropharyngeal erythema  Eyes:      General: No scleral icterus  Right eye: No discharge  Left eye: No discharge  Extraocular Movements: Extraocular movements intact  Conjunctiva/sclera: Conjunctivae normal       Pupils: Pupils are equal, round, and reactive to light  Neck:      Vascular: No carotid bruit  Cardiovascular:      Rate and Rhythm: Normal rate and regular rhythm  Pulses: Normal pulses  Heart sounds: Normal heart sounds  No murmur heard  No friction rub  No gallop  Pulmonary:      Effort: Pulmonary effort is normal  No respiratory distress  Breath sounds: Normal breath sounds  No stridor  No wheezing, rhonchi or rales  Chest:      Chest wall: No tenderness  Abdominal:      General: Bowel sounds are normal  There is no distension  Palpations: Abdomen is soft  There is no mass  Tenderness: There is no abdominal tenderness  There is no right CVA tenderness, left CVA tenderness, guarding or rebound  Hernia: No hernia is present  Musculoskeletal:         General: No swelling, tenderness, deformity or signs of injury  Normal range of motion  Cervical back: Normal range of motion and neck supple  No rigidity or tenderness  Right lower leg: No edema  Left lower leg: No edema  Lymphadenopathy:      Cervical: No cervical adenopathy  Skin:     General: Skin is warm and dry  Capillary Refill: Capillary refill takes less than 2 seconds  Coloration: Skin is not jaundiced or pale  Findings: No bruising, erythema, lesion or rash  Neurological:      General: No focal deficit present  Mental Status: He is alert and oriented to person, place, and time  Mental status is at baseline  Cranial Nerves: No cranial nerve deficit  Sensory: No sensory deficit  Motor: No weakness        Coordination: Coordination normal       Gait: Gait normal       Deep Tendon Reflexes: Reflexes normal    Psychiatric:         Mood and Affect: Mood normal          Behavior: Behavior normal          Thought Content: Thought content normal          Judgment: Judgment normal          BMI Counseling: Body mass index is 29 07 kg/m²  The BMI is above normal  Nutrition recommendations include reducing portion sizes, decreasing overall calorie intake, 3-5 servings of fruits/vegetables daily, consuming healthier snacks, moderation in carbohydrate intake, increasing intake of lean protein, reducing intake of saturated fat and trans fat and reducing intake of cholesterol  Exercise recommendations include exercising 3-5 times per week

## 2022-02-18 NOTE — ASSESSMENT & PLAN NOTE
Patient did have a lipid profile performed prior to the visit today  His total cholesterol is stable but he does have some increase in his LDL cholesterol any states he is not as physically active at this point time nor watching his diet as closely as he should  The again did discuss with the patient the importance of limiting fats and cholesterol as much as possible from the diet and trying to get back to some type of routine exercise which will help boost the HDL and hopefully lower the LDL  Check another lipid profile in 6 months

## 2022-04-21 ENCOUNTER — OFFICE VISIT (OUTPATIENT)
Dept: INTERNAL MEDICINE CLINIC | Facility: CLINIC | Age: 62
End: 2022-04-21
Payer: COMMERCIAL

## 2022-04-21 VITALS
WEIGHT: 200 LBS | HEIGHT: 70 IN | TEMPERATURE: 98.3 F | BODY MASS INDEX: 28.63 KG/M2 | OXYGEN SATURATION: 98 % | SYSTOLIC BLOOD PRESSURE: 130 MMHG | HEART RATE: 85 BPM | DIASTOLIC BLOOD PRESSURE: 76 MMHG

## 2022-04-21 DIAGNOSIS — J01.81 OTHER ACUTE RECURRENT SINUSITIS: Primary | ICD-10-CM

## 2022-04-21 PROCEDURE — 1036F TOBACCO NON-USER: CPT | Performed by: INTERNAL MEDICINE

## 2022-04-21 PROCEDURE — 3008F BODY MASS INDEX DOCD: CPT | Performed by: INTERNAL MEDICINE

## 2022-04-21 PROCEDURE — 99213 OFFICE O/P EST LOW 20 MIN: CPT | Performed by: INTERNAL MEDICINE

## 2022-04-21 RX ORDER — AMOXICILLIN 500 MG/1
500 CAPSULE ORAL EVERY 8 HOURS SCHEDULED
Qty: 30 CAPSULE | Refills: 0 | Status: SHIPPED | OUTPATIENT
Start: 2022-04-21 | End: 2022-05-01

## 2022-04-21 NOTE — ASSESSMENT & PLAN NOTE
Patient is being seen today for acute evaluation  States he has suffering from an upper respiratory tract infection for the past 3-4 days  Nasal congestion postnasal drip no sinus headache  Occasionally some epistaxis but not severe, coughing up green to yellow mucus  No shortness of breath  He did check for COVID virus and testing at home was negative  Denies any shortness of breath no GI symptoms no headaches  No fever or chills  Evaluation patient does have enlarged red nasal turbinates bilaterally a thick yellow to greenish nasal discharge, erythema to posterior airway with a thick yellow to green postnasal drip  Lungs are clear  Patient was placed on amoxicillin 500 milligrams 3 times a day for 10 days told to use some saline nose rinse to help with his nasal congestion    Call if not improving

## 2022-04-21 NOTE — PROGRESS NOTES
Assessment/Plan:    Other acute recurrent sinusitis  Patient is being seen today for acute evaluation  States he has suffering from an upper respiratory tract infection for the past 3-4 days  Nasal congestion postnasal drip no sinus headache  Occasionally some epistaxis but not severe, coughing up green to yellow mucus  No shortness of breath  He did check for COVID virus and testing at home was negative  Denies any shortness of breath no GI symptoms no headaches  No fever or chills  Evaluation patient does have enlarged red nasal turbinates bilaterally a thick yellow to greenish nasal discharge, erythema to posterior airway with a thick yellow to green postnasal drip  Lungs are clear  Patient was placed on amoxicillin 500 milligrams 3 times a day for 10 days told to use some saline nose rinse to help with his nasal congestion  Call if not improving       Diagnoses and all orders for this visit:    Other acute recurrent sinusitis  -     amoxicillin (AMOXIL) 500 mg capsule; Take 1 capsule (500 mg total) by mouth every 8 (eight) hours for 10 days          Subjective:      Patient ID: Kya Jones is a 62 y o  male  Patient is a 67-year-old male history of medical problems as outlined previously who is here today for evaluation acutely complaining of S respiratory tract symptoms the consistent with possible sinusitis over the past 3-4 days  Nasal congestion sore throat postnasal drip  No fever chills  Production of a green to yellow mucus to the nares with some epistaxis  Coughing up again some green to yellow mucus occasionally  No shortness of breath      The following portions of the patient's history were reviewed and updated as appropriate:   He  has no past medical history on file    He   Patient Active Problem List    Diagnosis Date Noted    Other acute recurrent sinusitis 04/21/2022    Sensorineural hearing loss (SNHL) of both ears 12/08/2020    Familial hypercholesterolemia 08/14/2020    Decreased hearing of both ears 08/14/2020    Chronic right-sided low back pain with sciatica 12/05/2019    Fatigue 12/05/2018    Overweight 12/05/2018    Postnasal drip 12/05/2018    Mood disturbance 12/05/2018    KEVIN (obstructive sleep apnea)     Snoring     Daytime sleepiness     Left groin mass 06/26/2018    Healthcare maintenance 06/26/2018     He  has no past surgical history on file  His family history is not on file  He  reports that he has never smoked  He has never used smokeless tobacco  He reports current alcohol use  He reports that he does not use drugs  Current Outpatient Medications   Medication Sig Dispense Refill    rosuvastatin (CRESTOR) 10 MG tablet Take 1 tablet (10 mg total) by mouth daily 90 tablet 3    amoxicillin (AMOXIL) 500 mg capsule Take 1 capsule (500 mg total) by mouth every 8 (eight) hours for 10 days 30 capsule 0     No current facility-administered medications for this visit  Current Outpatient Medications on File Prior to Visit   Medication Sig    rosuvastatin (CRESTOR) 10 MG tablet Take 1 tablet (10 mg total) by mouth daily     No current facility-administered medications on file prior to visit  He has No Known Allergies       Review of Systems   Constitutional: Negative  HENT: Positive for congestion, nosebleeds, postnasal drip, rhinorrhea and sore throat  Negative for dental problem, drooling, ear discharge, ear pain, facial swelling, hearing loss, mouth sores, sinus pressure, sinus pain, sneezing, tinnitus, trouble swallowing and voice change  Eyes: Negative  Respiratory: Positive for cough  Negative for apnea, choking, chest tightness, shortness of breath, wheezing and stridor  Cardiovascular: Negative  Gastrointestinal: Negative  Endocrine: Negative  Genitourinary: Negative  Musculoskeletal: Negative  Skin: Negative  Allergic/Immunologic: Negative  Neurological: Negative  Hematological: Negative  Psychiatric/Behavioral: Negative  Objective:      /76   Pulse 85   Temp 98 3 °F (36 8 °C)   Ht 5' 10" (1 778 m)   Wt 90 7 kg (200 lb)   SpO2 98%   BMI 28 70 kg/m²          Physical Exam  Vitals and nursing note reviewed  Constitutional:       General: He is not in acute distress  Appearance: He is not ill-appearing, toxic-appearing or diaphoretic  Comments: Pleasant congested-sounding 62-year-old male who is awake alert in no acute distress oriented x3   HENT:      Head: Normocephalic and atraumatic  Right Ear: Tympanic membrane, ear canal and external ear normal  There is no impacted cerumen  Left Ear: Tympanic membrane, ear canal and external ear normal  There is no impacted cerumen  Nose: Congestion and rhinorrhea present  Comments: Patient has diffuse erythema to nasal mucosa with enlarged red turbinates bilaterally, thick green yellowish nasal discharge     Mouth/Throat:      Mouth: Mucous membranes are moist       Pharynx: Oropharyngeal exudate and posterior oropharyngeal erythema present  Comments: Patient does have erythema to posterior airway cobblestoning with a thick green to yellowish postnasal drip  Eyes:      General:         Right eye: No discharge  Left eye: No discharge  Extraocular Movements: Extraocular movements intact  Conjunctiva/sclera: Conjunctivae normal       Pupils: Pupils are equal, round, and reactive to light  Neck:      Vascular: No carotid bruit  Cardiovascular:      Rate and Rhythm: Normal rate and regular rhythm  Pulses: Normal pulses  Heart sounds: Normal heart sounds  No murmur heard  No friction rub  No gallop  Pulmonary:      Effort: Pulmonary effort is normal  No respiratory distress  Breath sounds: Normal breath sounds  No stridor  No wheezing, rhonchi or rales  Chest:      Chest wall: No tenderness     Abdominal:      General: Bowel sounds are normal       Palpations: Abdomen is soft  Musculoskeletal:      Cervical back: Normal range of motion and neck supple  No rigidity or tenderness  Lymphadenopathy:      Cervical: No cervical adenopathy  Skin:     General: Skin is warm and dry  Neurological:      General: No focal deficit present  Mental Status: He is alert and oriented to person, place, and time  Mental status is at baseline  Psychiatric:         Mood and Affect: Mood normal          Behavior: Behavior normal          Thought Content:  Thought content normal          Judgment: Judgment normal

## 2022-06-13 ENCOUNTER — TELEPHONE (OUTPATIENT)
Dept: INTERNAL MEDICINE CLINIC | Facility: CLINIC | Age: 62
End: 2022-06-13

## 2022-06-13 NOTE — TELEPHONE ENCOUNTER
Patient called and having back pain and would like advice and he can be reached at [phone]    Thank you

## 2022-06-14 DIAGNOSIS — G89.29 CHRONIC RIGHT-SIDED LOW BACK PAIN WITH RIGHT-SIDED SCIATICA: Primary | ICD-10-CM

## 2022-06-14 DIAGNOSIS — M54.41 CHRONIC RIGHT-SIDED LOW BACK PAIN WITH RIGHT-SIDED SCIATICA: Primary | ICD-10-CM

## 2022-06-14 RX ORDER — METHYLPREDNISOLONE 4 MG/1
TABLET ORAL
Qty: 21 EACH | Refills: 0 | Status: SHIPPED | OUTPATIENT
Start: 2022-06-14

## 2022-06-14 NOTE — TELEPHONE ENCOUNTER
Patient called back and asked if he can have a prescription for his back pain and he can be reached at [phone]    Thank you

## 2022-06-14 NOTE — ASSESSMENT & PLAN NOTE
Patient is calling today  Has had recurrence of low back pain on the right with some sciatica    I did discuss with the patient we could send in a refill for a Medrol Dosepak which had helped in the past but if he is having further problems he would need to be seen in the office for evaluation

## 2022-06-30 ENCOUNTER — TELEPHONE (OUTPATIENT)
Dept: INTERNAL MEDICINE CLINIC | Facility: CLINIC | Age: 62
End: 2022-06-30

## 2022-06-30 NOTE — TELEPHONE ENCOUNTER
Patient called and he sent a my chart message on 6/24/22 and he would like to schedule a sleep test the week of July 18  He said this can wait until you come back from vacation and he asked if you could please  call him back at [phone]    Thank you

## 2022-07-08 ENCOUNTER — TELEPHONE (OUTPATIENT)
Dept: INTERNAL MEDICINE CLINIC | Facility: CLINIC | Age: 62
End: 2022-07-08

## 2022-07-08 DIAGNOSIS — R45.86 MOOD DISTURBANCE: ICD-10-CM

## 2022-07-08 DIAGNOSIS — R53.83 FATIGUE, UNSPECIFIED TYPE: ICD-10-CM

## 2022-07-08 DIAGNOSIS — R06.83 SNORING: ICD-10-CM

## 2022-07-08 DIAGNOSIS — R40.0 DAYTIME SLEEPINESS: Primary | ICD-10-CM

## 2022-07-08 NOTE — TELEPHONE ENCOUNTER
Patient called to see if you received his message about a sleep study wanting to get done    Patient can be reached at [phone]

## 2022-07-11 ENCOUNTER — TELEPHONE (OUTPATIENT)
Dept: INTERNAL MEDICINE CLINIC | Facility: CLINIC | Age: 62
End: 2022-07-11

## 2022-07-11 DIAGNOSIS — G47.33 OSA (OBSTRUCTIVE SLEEP APNEA): Primary | ICD-10-CM

## 2022-07-11 NOTE — TELEPHONE ENCOUNTER
Patient called and asked for a sleep study that he can stay over night at  He said one was put it for home study but prefers to stay at a facility  He can be reached at [phone]   Thank you

## 2022-07-18 ENCOUNTER — TELEPHONE (OUTPATIENT)
Dept: SLEEP CENTER | Facility: CLINIC | Age: 62
End: 2022-07-18

## 2022-07-18 NOTE — TELEPHONE ENCOUNTER
Returned a call from patient  He is asking for the price of his study   Gave him the number for the pre encounter staff

## 2022-07-19 ENCOUNTER — TELEPHONE (OUTPATIENT)
Dept: SLEEP CENTER | Facility: CLINIC | Age: 62
End: 2022-07-19

## 2022-07-19 NOTE — TELEPHONE ENCOUNTER
----- Message from Sabra Mcdaniels MD sent at 7/16/2022 12:51 PM EDT -----  Approved    ----- Message -----  From: Frank Boogie: 7/13/2022   9:29 AM EDT  To: [address] [place] [address] Provider    This extended diagnostic sleep study needs approval      If approved please sign and return to clerical pool  If denied please include reasons why  Also provide alternative testing if warranted  Please sign and return to clerical pool

## 2022-07-21 ENCOUNTER — TELEPHONE (OUTPATIENT)
Dept: SLEEP CENTER | Facility: CLINIC | Age: 62
End: 2022-07-21

## 2022-07-21 NOTE — TELEPHONE ENCOUNTER
----- Message from Gigi Valentin MD sent at 7/16/2022 12:52 PM EDT -----  Approved    ----- Message -----  From: Melinda Rivas  Sent: 7/11/2022   9:03 AM EDT  To: [facility] Provider    This Home sleep study needs approval      If approved please sign and return to clerical pool  If denied please include reasons why  Also provide alternative testing if warranted  Please sign and return to clerical pool

## 2022-07-22 ENCOUNTER — TELEPHONE (OUTPATIENT)
Dept: INTERNAL MEDICINE CLINIC | Facility: CLINIC | Age: 62
End: 2022-07-22

## 2022-07-29 ENCOUNTER — OFFICE VISIT (OUTPATIENT)
Dept: INTERNAL MEDICINE CLINIC | Facility: CLINIC | Age: 62
End: 2022-07-29
Payer: COMMERCIAL

## 2022-07-29 ENCOUNTER — HOSPITAL ENCOUNTER (OUTPATIENT)
Dept: SLEEP CENTER | Facility: CLINIC | Age: 62
Discharge: HOME | End: 2022-07-29
Payer: COMMERCIAL

## 2022-07-29 VITALS
DIASTOLIC BLOOD PRESSURE: 72 MMHG | SYSTOLIC BLOOD PRESSURE: 128 MMHG | BODY MASS INDEX: 28.77 KG/M2 | HEART RATE: 79 BPM | TEMPERATURE: 98 F | WEIGHT: 201 LBS | HEIGHT: 70 IN | OXYGEN SATURATION: 97 % | RESPIRATION RATE: 18 BRPM

## 2022-07-29 DIAGNOSIS — M54.41 CHRONIC RIGHT-SIDED LOW BACK PAIN WITH RIGHT-SIDED SCIATICA: Primary | ICD-10-CM

## 2022-07-29 DIAGNOSIS — G89.29 CHRONIC RIGHT-SIDED LOW BACK PAIN WITH RIGHT-SIDED SCIATICA: Primary | ICD-10-CM

## 2022-07-29 DIAGNOSIS — G47.33 OSA (OBSTRUCTIVE SLEEP APNEA): ICD-10-CM

## 2022-07-29 PROCEDURE — 99213 OFFICE O/P EST LOW 20 MIN: CPT | Performed by: INTERNAL MEDICINE

## 2022-07-29 PROCEDURE — 95810 POLYSOM 6/> YRS 4/> PARAM: CPT

## 2022-07-29 PROCEDURE — 95808 POLYSOM ANY AGE 1-3> PARAM: CPT | Performed by: INTERNAL MEDICINE

## 2022-07-29 PROCEDURE — 95810 POLYSOM 6/> YRS 4/> PARAM: CPT | Performed by: INTERNAL MEDICINE

## 2022-07-29 RX ORDER — MELOXICAM 15 MG/1
15 TABLET ORAL DAILY
Qty: 30 TABLET | Refills: 5 | Status: SHIPPED | OUTPATIENT
Start: 2022-07-29

## 2022-07-29 NOTE — ASSESSMENT & PLAN NOTE
Patient is here today for evaluation  States that after working around the house over the weekend doing some painting he began to have and experience some discomfort low back pain again  Denies any weakness down leg  Patient states now the pain seems to be midline but he does have some sciatica on the right  On evaluation as previously patient has a flattening to the lumbar curve  With maneuvers patient has some exacerbation discomfort especially backward bending  No weakness to lower extremities but with persistent symptoms we have concerns with the patient having conservative treatment and recurrence problems  Patient will be going for an MRI of the spine and we will see the patient after this is been completed in make decisions as to whether further evaluation and or treatment is necessary

## 2022-07-29 NOTE — PROGRESS NOTES
Assessment/Plan:    Chronic right-sided low back pain with sciatica  Patient is here today for evaluation  States that after working around the house over the weekend doing some painting he began to have and experience some discomfort low back pain again  Denies any weakness down leg  Patient states now the pain seems to be midline but he does have some sciatica on the right  On evaluation as previously patient has a flattening to the lumbar curve  With maneuvers patient has some exacerbation discomfort especially backward bending  No weakness to lower extremities but with persistent symptoms we have concerns with the patient having conservative treatment and recurrence problems  Patient will be going for an MRI of the spine and we will see the patient after this is been completed in make decisions as to whether further evaluation and or treatment is necessary  Diagnoses and all orders for this visit:    Chronic right-sided low back pain with right-sided sciatica  -     meloxicam (Mobic) 15 mg tablet; Take 1 tablet (15 mg total) by mouth daily  -     MRI lumbar spine w contrast; Future          Subjective:      Patient ID: Natividad Mejia is a 62 y o  male  62-year-old male history of medical problems as outlined previously who is being seen today for acute evaluation  Has had recurrence of her low back pain discomfort after working at home doing some painting  Patient states that the pain is persistent  Now mostly centered low back in the midline  Some sciatica and sciatic symptoms on the right  No increasing weakness  Difficulty with positioning both sitting and standing but is comfortable laying down at night and is sleeping comfortably  The following portions of the patient's history were reviewed and updated as appropriate:   He  has no past medical history on file    He   Patient Active Problem List    Diagnosis Date Noted    Other acute recurrent sinusitis 04/21/2022    Sensorineural hearing loss (SNHL) of both ears 12/08/2020    Familial hypercholesterolemia 08/14/2020    Decreased hearing of both ears 08/14/2020    Chronic right-sided low back pain with sciatica 12/05/2019    Fatigue 12/05/2018    Overweight 12/05/2018    Postnasal drip 12/05/2018    Mood disturbance 12/05/2018    KEVIN (obstructive sleep apnea)     Snoring     Daytime sleepiness     Left groin mass 06/26/2018    Healthcare maintenance 06/26/2018     He  has no past surgical history on file  His family history is not on file  He  reports that he has never smoked  He has never used smokeless tobacco  He reports current alcohol use  He reports that he does not use drugs  Current Outpatient Medications   Medication Sig Dispense Refill    meloxicam (Mobic) 15 mg tablet Take 1 tablet (15 mg total) by mouth daily 30 tablet 5    methylPREDNISolone 4 MG tablet therapy pack Use as directed on package 21 each 0    rosuvastatin (CRESTOR) 10 MG tablet Take 1 tablet (10 mg total) by mouth daily 90 tablet 3     No current facility-administered medications for this visit  Current Outpatient Medications on File Prior to Visit   Medication Sig    methylPREDNISolone 4 MG tablet therapy pack Use as directed on package    rosuvastatin (CRESTOR) 10 MG tablet Take 1 tablet (10 mg total) by mouth daily     No current facility-administered medications on file prior to visit  He has No Known Allergies       Review of Systems   Constitutional: Positive for activity change (Has had some decreased activity level secondary back pain)  Negative for appetite change, chills, diaphoresis, fatigue, fever and unexpected weight change  HENT: Negative  Eyes: Negative  Respiratory: Negative  Cardiovascular: Negative  Gastrointestinal: Negative  Endocrine: Negative  Genitourinary: Negative  Musculoskeletal: Positive for back pain   Negative for arthralgias, gait problem, joint swelling, myalgias, neck pain and neck stiffness  Skin: Negative  Allergic/Immunologic: Negative  Neurological: Negative  Hematological: Negative  Psychiatric/Behavioral: Negative  Objective:      /72 (BP Location: Left arm, Patient Position: Sitting, Cuff Size: Adult)   Pulse 79   Temp 98 °F (36 7 °C) (Tympanic)   Resp 18   Ht 5' 10" (1 778 m)   Wt 91 2 kg (201 lb)   SpO2 97%   BMI 28 84 kg/m²          Physical Exam  Vitals and nursing note reviewed  Constitutional:       General: He is not in acute distress  Appearance: Normal appearance  He is not ill-appearing, toxic-appearing or diaphoretic  Comments: Pleasant 28-year-old male who is awake alert  In no acute distress and oriented x3  HENT:      Head: Normocephalic and atraumatic  Cardiovascular:      Rate and Rhythm: Normal rate and regular rhythm  Pulses: Normal pulses  Heart sounds: Normal heart sounds  No murmur heard  No friction rub  No gallop  Pulmonary:      Effort: Pulmonary effort is normal       Breath sounds: Normal breath sounds  Abdominal:      General: Bowel sounds are normal       Palpations: Abdomen is soft  Musculoskeletal:         General: Tenderness and deformity present  No signs of injury  Cervical back: Normal range of motion and neck supple  Right lower leg: No edema  Left lower leg: No edema  Comments: Patient on evaluation has flattening to his lumbar curve as noted previously  Patient on evaluation does have a slight tenderness about patient to the area of the SI joint on the right  Patient has decreased straight leg raising bilaterally  Maneuvers range of motion some exacerbation of the pain with back bending midline  No weakness of significance to lower extremities  Skin:     General: Skin is warm and dry  Neurological:      Mental Status: He is alert and oriented to person, place, and time  Mental status is at baseline     Psychiatric:         Mood and Affect: Mood normal  Behavior: Behavior normal          Thought Content:  Thought content normal          Judgment: Judgment normal

## 2022-07-30 NOTE — PROGRESS NOTES
Sleep Study Documentation    Pre-Sleep Study       Sleep testing procedure explained to patient:YES    Patient napped prior to study:YES- less than 30 minutes  Napped after 2PM: yes    Caffeine:Dayshift worker after 12PM   Caffeine use:YES- coffee  6 to 18 ounces and energy drinks  1 serving    Alcohol:Dayshift workers after 5PM: Alcohol use:NO    Typical day for patient:YES       Study Documentation    Sleep Study Indications: KEVIN, snoring, excessive daytime sleepiness, witnessed apnea, impaired concentration/memory, unrefreshing sleep, fatigue, overweight, mood disturbance, low back pain, hearing loss,     Sleep Study: Diagnostic   Snore: Moderate  Supplemental O2: no    O2 flow rate (L/min) range   O2 flow rate (L/min) final   Minimum SaO2 88%  Baseline SaO2 95%        Mode of Therapy:    EKG abnormalities: no     EEG abnormalities: no    Sleep Study Recorded < 2 hours: N/A    Sleep Study Recorded > 2 hours but incomplete study: N/A    Sleep Study Recorded 6 hours but no sleep obtained: NO    Patient classification: employed       Post-Sleep Study    Medication used at bedtime or during sleep study:YES other prescription medications    Patient reports time it took to fall asleep:20 to 30 minutes    Patient reports waking up during study:3 or more times  Patient reports returning to sleep in 10 to 30 minutes  Patient reports sleeping 4 to 6 hours with dreaming  Patient reports sleep during study:typical    Patient rated sleepiness: Somewhat sleepy or tired    PAP treatment:no

## 2022-08-02 ENCOUNTER — TELEPHONE (OUTPATIENT)
Dept: INTERNAL MEDICINE CLINIC | Facility: CLINIC | Age: 62
End: 2022-08-02

## 2022-08-05 ENCOUNTER — TELEPHONE (OUTPATIENT)
Dept: SLEEP CENTER | Facility: CLINIC | Age: 62
End: 2022-08-05

## 2022-08-05 NOTE — TELEPHONE ENCOUNTER
Called patient and advised sleep study resulted and shows mild KEVIN and frequent PLM  No abnormalities on EEG  Per ordering physician Dr Jasson Stroud, patient to follow up with sleep medicine  Scheduled consult with Dr Rosita Chin in [place] 10/7/22 (Patient was last seen by Dr Rosita Chin 3/18/19)  none

## 2022-08-19 ENCOUNTER — OFFICE VISIT (OUTPATIENT)
Dept: SLEEP CENTER | Facility: CLINIC | Age: 62
End: 2022-08-19
Payer: COMMERCIAL

## 2022-08-19 VITALS
HEART RATE: 74 BPM | DIASTOLIC BLOOD PRESSURE: 78 MMHG | BODY MASS INDEX: 29.49 KG/M2 | WEIGHT: 206 LBS | HEIGHT: 70 IN | SYSTOLIC BLOOD PRESSURE: 135 MMHG

## 2022-08-19 DIAGNOSIS — G47.61 PLMD (PERIODIC LIMB MOVEMENT DISORDER): ICD-10-CM

## 2022-08-19 DIAGNOSIS — G47.33 OSA (OBSTRUCTIVE SLEEP APNEA): Primary | ICD-10-CM

## 2022-08-19 DIAGNOSIS — G47.19 EXCESSIVE DAYTIME SLEEPINESS: ICD-10-CM

## 2022-08-19 DIAGNOSIS — J34.2 DEVIATED NASAL SEPTUM: ICD-10-CM

## 2022-08-19 DIAGNOSIS — E66.3 OVERWEIGHT (BMI 25.0-29.9): ICD-10-CM

## 2022-08-19 PROCEDURE — 99204 OFFICE O/P NEW MOD 45 MIN: CPT | Performed by: INTERNAL MEDICINE

## 2022-08-19 NOTE — PATIENT INSTRUCTIONS
What is KEVIN? Obstructive sleep apnea is a common and serious sleep disorder that causes you to stop breathing during sleep  The airway repeatedly becomes blocked, limiting the amount of air that reaches your lungs  When this happens, you may snore loudly or making choking noises as you try to breathe  Your brain and body becomes oxygen deprived and you may wake up  This may happen a few times a night, or in more severe cases, several hundred times a night  Sleep apnea can make you wake up in the morning feeling tired or unrefreshed even though you have had a full night of sleep  During the day, you may feel fatigued, have difficulty concentrating or you may even unintentionally fall asleep  This is because your body is waking up numerous times throughout the night, even though you might not be conscious of each awakening  The lack of oxygen your body receives can have negative long-term consequences for your health  This includes:  High blood pressure  Heart disease  Irregular heart rhythms  Stroke  Pre-diabetes and diabetes  Depression    Testing  An objective evaluation of your sleep may be needed before your board certified sleep physician can make a diagnosis  Options include:   In-lab overnight sleep study  This type of sleep study requires you to stay overnight at a sleep center, in a bed that may resemble a hotel room  You will sleep with sensors hooked up to various parts of your body  These sensors record your brain waves, heartbeat, breathing and movement  An overnight sleep study also provides your doctor with the most complete information about your sleep  Learn more about an overnight sleep study  Home sleep apnea test  Some patients with high risk factors for obstructive sleep apnea and no other medical disorders may be candidates for a home sleep apnea test  The testing equipment differs in that it is less complicated than what is used in an overnight sleep study   As such, does not give all the data an in-lab will and if negative, may not mean you do not have the problem  Treatment for sleep apnea  includes using a continuous positive airway pressure (CPAP) machine to keep your airway open during sleep  A mask is placed over your nose and mouth, or just your nose  The mask is hooked to the CPAP machine that blows a gentle stream of air into the mask when you breathe  This helps keep your airway open so you can breathe more regularly  Extra oxygen may be given to you through the machine  You may be given a mouth device  It looks like a mouth guard or dental retainer and stops your tongue and mouth tissues from blocking your throat while you sleep  Surgery may be needed to remove extra tissues that block your mouth, throat, or nose  Manage sleep apnea:   Do not smoke  Nicotine and other chemicals in cigarettes and cigars can cause lung damage  Ask your healthcare provider for information if you currently smoke and need help to quit  E-cigarettes or smokeless tobacco still contain nicotine  Talk to your healthcare provider before you use these products  Do not drink alcohol or take sedative medicine before you go to sleep  Alcohol and sedatives can relax the muscles and tissues around your throat  This can block the airflow to your lungs  Maintain a healthy weight  Excess tissue around your throat may restrict your breathing  Ask your healthcare provider for information if you need to lose weight  Sleep on your side or use pillows designed to prevent sleep apnea  This prevents your tongue or other tissues from blocking your throat  You can also raise the head of your bed  Driving Safety  Refrain from driving when drowsy  Follow up with your healthcare provider as directed:  Write down your questions so you remember to ask them during your visits  Go to [employer] website for more information: Sleepeducation  org     What is KEVIN?    Obstructive sleep apnea is a common and serious sleep disorder that causes you to stop breathing during sleep  The airway repeatedly becomes blocked, limiting the amount of air that reaches your lungs  When this happens, you may snore loudly or making choking noises as you try to breathe  Your brain and body becomes oxygen deprived and you may wake up  This may happen a few times a night, or in more severe cases, several hundred times a night  Sleep apnea can make you wake up in the morning feeling tired or unrefreshed even though you have had a full night of sleep  During the day, you may feel fatigued, have difficulty concentrating or you may even unintentionally fall asleep  This is because your body is waking up numerous times throughout the night, even though you might not be conscious of each awakening  The lack of oxygen your body receives can have negative long-term consequences for your health  This includes:  High blood pressure  Heart disease  Irregular heart rhythms  Stroke  Pre-diabetes and diabetes  Depression    Testing  An objective evaluation of your sleep may be needed before your board certified sleep physician can make a diagnosis  Options include:   In-lab overnight sleep study  This type of sleep study requires you to stay overnight at a sleep center, in a bed that may resemble a hotel room  You will sleep with sensors hooked up to various parts of your body  These sensors record your brain waves, heartbeat, breathing and movement  An overnight sleep study also provides your doctor with the most complete information about your sleep  Learn more about an overnight sleep study  Home sleep apnea test  Some patients with high risk factors for obstructive sleep apnea and no other medical disorders may be candidates for a home sleep apnea test  The testing equipment differs in that it is less complicated than what is used in an overnight sleep study   As such, does not give all the data an in-lab will and if negative, may not mean you do not have the problem  Treatment for sleep apnea  includes using a continuous positive airway pressure (CPAP) machine to keep your airway open during sleep  A mask is placed over your nose and mouth, or just your nose  The mask is hooked to the CPAP machine that blows a gentle stream of air into the mask when you breathe  This helps keep your airway open so you can breathe more regularly  Extra oxygen may be given to you through the machine  You may be given a mouth device  It looks like a mouth guard or dental retainer and stops your tongue and mouth tissues from blocking your throat while you sleep  Surgery may be needed to remove extra tissues that block your mouth, throat, or nose  Manage sleep apnea:   Do not smoke  Nicotine and other chemicals in cigarettes and cigars can cause lung damage  Ask your healthcare provider for information if you currently smoke and need help to quit  E-cigarettes or smokeless tobacco still contain nicotine  Talk to your healthcare provider before you use these products  Do not drink alcohol or take sedative medicine before you go to sleep  Alcohol and sedatives can relax the muscles and tissues around your throat  This can block the airflow to your lungs  Maintain a healthy weight  Excess tissue around your throat may restrict your breathing  Ask your healthcare provider for information if you need to lose weight  Sleep on your side or use pillows designed to prevent sleep apnea  This prevents your tongue or other tissues from blocking your throat  You can also raise the head of your bed  Driving Safety  Refrain from driving when drowsy  Follow up with your healthcare provider as directed:  Write down your questions so you remember to ask them during your visits  Go to AAS website for more information: Sleepeducation  org             Nursing Support:  When: Monday through Friday 7A-5PM except holidays  Where: Our direct line is [phone]      If you are having a true emergency please call 911  In the event that the line is busy or it is after hours please leave a voice message and we will return your call  Please speak clearly, leaving your full name, birth date, best number to reach you and the reason for your call  Medication refills: We will need the name of the medication, the dosage, the ordering provider, whether you get a 30 or 90 day refill, and the pharmacy name and address  Medications will be ordered by the provider only  Nurses cannot call in prescriptions  Please allow 7 days for medication refills  Physician requested updates: If your provider requested that you call with an update after starting medication, please be ready to provide us the medication and dosage, what time you take your medication, the time you attempt to fall asleep, time you fall asleep, when you wake up, and what time you get out of bed  Sleep Study Results: We will contact you with sleep study results and/or next steps after the physician has reviewed your testing

## 2022-08-19 NOTE — PROGRESS NOTES
Consultation - Jeramy 49  62 y o  male  NSD:[birth year]  WJO:[id number]  [address] [place] [address]    Physician Requesting Consult: Omar Islas DO             Reason for Consult : At your kind request I saw Brenda Granda for initial sleep evaluation today  Patient recently had a diagnostic sleep study and is here to review results / treatment options  The study demonstrated   obstructive sleep apnea: AHI 6 7 /hour , higher while supine  Moderate intensity  snoring  was noted  Minimum oxygen saturation 88 %  and 0 1 minute of total sleep time was spent with saturations less than 90%  There were periodic limb movements of sleep for an index of 26 per hour but rarely associated with arousal      Home sleep testing in 2018 demonstrated a HERMES (respiratory event index of) 1 4 /hour  Minimum oxygen saturation was 91%  The snore index was 3 6%  PFSH, Problem List, Medications & Allergies were reviewed in EMR  Mar Hodge  has no past medical history on file  He has a current medication list which includes the following prescription(s): meloxicam, methylprednisolone, and rosuvastatin  HPI:  Study was undertaken for complaints of loud snoring, awakening with choking or gasping of several years duration  He had a prior home sleep study that did not demonstrate obstructive sleep apnea but symptoms have escalated since then  Other Complaints:  Tired irrespective of sleep  Restless Leg Syndrome: reports no suggestive symptoms  Parasomnia: no features reported    Sleep Routine (on average):   Typical Bedtime:  11:00 p m  Gets OOB:  6:00 a m  TIB:7 hrs  Sleep latency:<  30 minutes Sleep Interruptions:2-3/nite because of nocturia and able to fall back asleep  Awakens: needing an alarm  Upon awakening: is not always refreshed   Jose reports Excessive Daytime Sleepiness  takes planned naps for approximately 20 minutes during lunch breaks  He also dozes of when sedentary at home   He rated himself at Total score: 20 /24 on the [place] Sleepiness Scale  Habits:  reports that he has never smoked  He has never used smokeless tobacco  ;   E-Cigarette/Vaping    ;  reports no history of drug use ;  reports current alcohol use  ; Caffeine use:limited ; Exercise routine: irregular   Family History:  Father snored  ROS - reviewed and as attached  Significant for weight has been stable  He reported no nasal, respiratory or cardiac symptoms  Ping Rodriguez EXAM:  /78 (BP Location: Left arm, Patient Position: Sitting, Cuff Size: Adult)   Pulse 74   Ht 5' 10" (1 778 m)   Wt 93 4 kg (206 lb)   BMI 29 56 kg/m²    General: Well groomed male, well appearing, in no apparent distress  Neurological: Alert and orientated;  cooperative; Cranial nerves intact;    Psychiatric: Speech:clear and coherent;  Normal mood, affect & thought   Skin: warm and dry; Color& Hydration good; no facial rashes or lesions   HEENT:  Craniofacial anatomy: normal Sinuses: non- tender  TMJ: Normal    Eyes: EOM's intact;  conjunctiva/corneas clear   Ears: Externallyappear normal     Nasal Airway: is patent and assymetric nares Septum:intact; Mucosa: normal; Turbinates: normal; Rhinorrhea: None   Mouth: Lips: normal posture; Dentition: normal   Mucosa:moist  ; Hard Palate:narrow   Oropharryx: crowded and AP narrowing Tongue: Mallampati:Class IV, Mobile and ScallopedSoft Palate:  redundant  Tonsils: absent  Neck:is thick; Neck Circumference: 16 5 "; Supple; no abnormal masses; Thyroid:normal  Trachea:central     Lymph: No Cervical or Submandibular Lymhadenopathy  Heart: S1,S2 normal; RRR; no gallop; no murmurs   Lungs: Respiratory Effort:normal  Air entry good bilaterally  No wheezes  No rales  Abdomen: Obese, Soft & non-tender    Extremities: No pedal edema  No clubbing or cyanosis  Musculoskeletal:  Motor normal; Gait:normal        IMPRESSION: Primary/Secondary Sleep Diagnoses (to Medical or Psych conditions) & Comorbidities   1   KEVIN (obstructive sleep apnea)  Cpap DME   2  Excessive daytime sleepiness     3  PLMD (periodic limb movement disorder)     4  Deviated nasal septum     5  Overweight (BMI 25 0-29  9)          PLAN:   1  I reviewed results of the Sleep studies with the patient  2  With respect to above conditions, I counseled on pathophysiology, diagnosis, treatment options, risks and benefits; inter-relationship and effects on symptoms and comorbidities; risks of no treatment; costs and insurance aspects  3  Patient elected positive airway pressure therapy and care coordinated with the DME provider for set-up  4  Need for compliance with therapy and weight reduction were emphasized  5  If drowsiness persists, he may also warrant treatment for the periodic limb movements of sleep  6  Follow-up to be scheduled in 2 months to monitor compliance, progress and to adjust therapy  Thank you for allowing me to participate in the care of this patient  I will keep you apprised of developments  Sincerely,      Authenticated electronically on 27/37/61   Board Certified Specialist     Portions of the record may have been created with voice recognition software  Occasional wrong word or "sound a like" substitutions may have occurred due to the inherent limitations of voice recognition software  There may also be notations and random deletions of words or characters from malfunctioning software  Read the chart carefully and recognize, using context, where substitutions/deletions have occurred

## 2022-08-19 NOTE — PROGRESS NOTES
Review of Systems      Genitourinary need to urinate more than twice a night   Cardiology none   Gastrointestinal none   Neurology none   Constitutional fatigue   Integumentary none   Psychiatry none   Musculoskeletal back pain   Pulmonary snoring   ENT none   Endocrine none   Hematological none

## 2022-08-22 ENCOUNTER — TELEPHONE (OUTPATIENT)
Dept: SLEEP CENTER | Facility: CLINIC | Age: 62
End: 2022-08-22

## 2022-08-23 LAB

## 2022-09-02 LAB

## 2022-09-04 ENCOUNTER — APPOINTMENT (OUTPATIENT)
Dept: LAB | Facility: CLINIC | Age: 62
End: 2022-09-04
Payer: COMMERCIAL

## 2022-09-04 ENCOUNTER — HOSPITAL ENCOUNTER (OUTPATIENT)
Dept: MRI IMAGING | Facility: HOSPITAL | Age: 62
Discharge: HOME | End: 2022-09-04
Payer: COMMERCIAL

## 2022-09-04 DIAGNOSIS — Z00.00 HEALTHCARE MAINTENANCE: ICD-10-CM

## 2022-09-04 DIAGNOSIS — M54.41 CHRONIC RIGHT-SIDED LOW BACK PAIN WITH RIGHT-SIDED SCIATICA: ICD-10-CM

## 2022-09-04 DIAGNOSIS — G89.29 CHRONIC RIGHT-SIDED LOW BACK PAIN WITH RIGHT-SIDED SCIATICA: ICD-10-CM

## 2022-09-04 LAB
ALBUMIN SERPL BCP-MCNC: 4.4 G/DL (ref 3.5–5)
ALP SERPL-CCNC: 48 U/L (ref 34–104)
ALT SERPL W P-5'-P-CCNC: 23 U/L (ref 7–52)
ANION GAP SERPL CALCULATED.3IONS-SCNC: 5 MMOL/L (ref 4–13)
AST SERPL W P-5'-P-CCNC: 19 U/L (ref 13–39)
BASOPHILS # BLD AUTO: 0.04 THOUSANDS/ΜL (ref 0–0.1)
BASOPHILS NFR BLD AUTO: 1 % (ref 0–1)
BILIRUB SERPL-MCNC: 0.48 MG/DL (ref 0.2–1)
BILIRUB UR QL STRIP: NEGATIVE
BUN SERPL-MCNC: 22 MG/DL (ref 5–25)
CALCIUM SERPL-MCNC: 9.4 MG/DL (ref 8.4–10.2)
CHLORIDE SERPL-SCNC: 106 MMOL/L (ref 96–108)
CHOLEST SERPL-MCNC: 191 MG/DL
CLARITY UR: CLEAR
CO2 SERPL-SCNC: 29 MMOL/L (ref 21–32)
COLOR UR: YELLOW
CREAT SERPL-MCNC: 1.1 MG/DL (ref 0.6–1.3)
EOSINOPHIL # BLD AUTO: 0.27 THOUSAND/ΜL (ref 0–0.61)
EOSINOPHIL NFR BLD AUTO: 4 % (ref 0–6)
ERYTHROCYTE [DISTWIDTH] IN BLOOD BY AUTOMATED COUNT: 13.5 % (ref 11.6–15.1)
GFR SERPL CREATININE-BSD FRML MDRD: 72 ML/MIN/1.73SQ M
GLUCOSE P FAST SERPL-MCNC: 97 MG/DL (ref 65–99)
GLUCOSE UR STRIP-MCNC: NEGATIVE MG/DL
HCT VFR BLD AUTO: 43.5 % (ref 36.5–49.3)
HDLC SERPL-MCNC: 44 MG/DL
HEMOCCULT STL QL IA: NEGATIVE
HGB BLD-MCNC: 14.8 G/DL (ref 12–17)
HGB UR QL STRIP.AUTO: NEGATIVE
IMM GRANULOCYTES # BLD AUTO: 0.01 THOUSAND/UL (ref 0–0.2)
IMM GRANULOCYTES NFR BLD AUTO: 0 % (ref 0–2)
KETONES UR STRIP-MCNC: NEGATIVE MG/DL
LDLC SERPL CALC-MCNC: 117 MG/DL (ref 0–100)
LEUKOCYTE ESTERASE UR QL STRIP: NEGATIVE
LYMPHOCYTES # BLD AUTO: 1.48 THOUSANDS/ΜL (ref 0.6–4.47)
LYMPHOCYTES NFR BLD AUTO: 21 % (ref 14–44)
MCH RBC QN AUTO: 29.6 PG (ref 26.8–34.3)
MCHC RBC AUTO-ENTMCNC: 34 G/DL (ref 31.4–37.4)
MCV RBC AUTO: 87 FL (ref 82–98)
MONOCYTES # BLD AUTO: 0.5 THOUSAND/ΜL (ref 0.17–1.22)
MONOCYTES NFR BLD AUTO: 7 % (ref 4–12)
NEUTROPHILS # BLD AUTO: 4.76 THOUSANDS/ΜL (ref 1.85–7.62)
NEUTS SEG NFR BLD AUTO: 67 % (ref 43–75)
NITRITE UR QL STRIP: NEGATIVE
NONHDLC SERPL-MCNC: 147 MG/DL
NRBC BLD AUTO-RTO: 0 /100 WBCS
PH UR STRIP.AUTO: 6 [PH]
PLATELET # BLD AUTO: 244 THOUSANDS/UL (ref 149–390)
PMV BLD AUTO: 9.2 FL (ref 8.9–12.7)
POTASSIUM SERPL-SCNC: 3.8 MMOL/L (ref 3.5–5.3)
PROT SERPL-MCNC: 6.9 G/DL (ref 6.4–8.4)
PROT UR STRIP-MCNC: NEGATIVE MG/DL
PSA SERPL-MCNC: 1.2 NG/ML (ref 0–4)
RBC # BLD AUTO: 5 MILLION/UL (ref 3.88–5.62)
SODIUM SERPL-SCNC: 140 MMOL/L (ref 135–147)
SP GR UR STRIP.AUTO: 1.02 (ref 1–1.03)
TRIGL SERPL-MCNC: 149 MG/DL
UROBILINOGEN UR STRIP-ACNC: <2 MG/DL
WBC # BLD AUTO: 7.06 THOUSAND/UL (ref 4.31–10.16)

## 2022-09-04 PROCEDURE — 81003 URINALYSIS AUTO W/O SCOPE: CPT

## 2022-09-04 PROCEDURE — G1004 CDSM NDSC: HCPCS

## 2022-09-04 PROCEDURE — G0328 FECAL BLOOD SCRN IMMUNOASSAY: HCPCS

## 2022-09-04 PROCEDURE — G0103 PSA SCREENING: HCPCS

## 2022-09-04 PROCEDURE — 80061 LIPID PANEL: CPT

## 2022-09-04 PROCEDURE — 36415 COLL VENOUS BLD VENIPUNCTURE: CPT

## 2022-09-04 PROCEDURE — 80053 COMPREHEN METABOLIC PANEL: CPT

## 2022-09-04 PROCEDURE — 85025 COMPLETE CBC W/AUTO DIFF WBC: CPT

## 2022-09-04 PROCEDURE — 72158 MRI LUMBAR SPINE W/O & W/DYE: CPT

## 2022-09-04 PROCEDURE — A9585 GADOBUTROL INJECTION: HCPCS | Performed by: INTERNAL MEDICINE

## 2022-09-04 RX ADMIN — GADOBUTROL 9 ML: 604.72 INJECTION INTRAVENOUS at 09:59

## 2022-09-09 ENCOUNTER — OFFICE VISIT (OUTPATIENT)
Dept: INTERNAL MEDICINE CLINIC | Facility: CLINIC | Age: 62
End: 2022-09-09
Payer: COMMERCIAL

## 2022-09-09 ENCOUNTER — E-CONSULT (OUTPATIENT)
Dept: NEUROSURGERY | Facility: CLINIC | Age: 62
End: 2022-09-09
Payer: COMMERCIAL

## 2022-09-09 VITALS
SYSTOLIC BLOOD PRESSURE: 118 MMHG | TEMPERATURE: 97 F | HEART RATE: 64 BPM | WEIGHT: 207 LBS | DIASTOLIC BLOOD PRESSURE: 60 MMHG | RESPIRATION RATE: 18 BRPM | HEIGHT: 70 IN | OXYGEN SATURATION: 98 % | BODY MASS INDEX: 29.63 KG/M2

## 2022-09-09 DIAGNOSIS — M54.41 CHRONIC RIGHT-SIDED LOW BACK PAIN WITH RIGHT-SIDED SCIATICA: ICD-10-CM

## 2022-09-09 DIAGNOSIS — G47.33 OSA (OBSTRUCTIVE SLEEP APNEA): ICD-10-CM

## 2022-09-09 DIAGNOSIS — E66.3 OVERWEIGHT: ICD-10-CM

## 2022-09-09 DIAGNOSIS — E78.01 FAMILIAL HYPERCHOLESTEROLEMIA: Primary | ICD-10-CM

## 2022-09-09 DIAGNOSIS — G89.29 CHRONIC RIGHT-SIDED LOW BACK PAIN WITH RIGHT-SIDED SCIATICA: Primary | ICD-10-CM

## 2022-09-09 DIAGNOSIS — M54.41 CHRONIC RIGHT-SIDED LOW BACK PAIN WITH RIGHT-SIDED SCIATICA: Primary | ICD-10-CM

## 2022-09-09 DIAGNOSIS — Z00.00 HEALTHCARE MAINTENANCE: ICD-10-CM

## 2022-09-09 DIAGNOSIS — H90.3 SENSORINEURAL HEARING LOSS (SNHL) OF BOTH EARS: ICD-10-CM

## 2022-09-09 DIAGNOSIS — G89.29 CHRONIC RIGHT-SIDED LOW BACK PAIN WITH RIGHT-SIDED SCIATICA: ICD-10-CM

## 2022-09-09 PROCEDURE — 99446 NTRPROF PH1/NTRNET/EHR 5-10: CPT | Performed by: NEUROLOGICAL SURGERY

## 2022-09-09 PROCEDURE — 99396 PREV VISIT EST AGE 40-64: CPT | Performed by: INTERNAL MEDICINE

## 2022-09-09 PROCEDURE — NC001 PR NO CHARGE: Performed by: NEUROLOGICAL SURGERY

## 2022-09-09 NOTE — PROGRESS NOTES
E-Consult  Liseth Espinal 62 y o  male MRN: [id number]  Encounter Date: 09/09/22      Reason for Consult / Principal Problem:  Lower back and right leg pain  Consulting Provider: Veena Isaac MD    Requesting Provider: Griselda Berry DO       ASSESSMENT:  On review of the patient's chart, he has a history of acute onset lower back and right leg pain over the summer  No difficulties with bowel bladder function or mention  Recent MRI does demonstrate stenosis at multiple levels including a right L1-2 disc herniation and a smaller right L2-3 disc herniation  RECOMMENDATIONS:  Neurosurgical consultation is reasonable  Would also recommend nonsurgical pain management strategies in the interim  Would recommend educating patient on signs and symptoms of cauda equina syndrome as cause to present to the emergency department  Total time spent in review of data, discussion with requesting provider and rendering advice was 5-10 Mins, with greater than 50% of the time spent in communication  A written report was communicated back to the PCP/requesting provider

## 2022-09-09 NOTE — ASSESSMENT & PLAN NOTE
Patient is a 71-year-old male with a history of medical problems as outlined previously who is here today for a routine physical   He did have labs performed prior visit today and we did discuss the results  Patient states in general doing well is despite significant findings with recent MRI of his lumbar spine showing diffuse and severe disease which need to be addressed by neuro surgery  He states at this point he seems to be stable taking the Mobic on a daily basis which he states has helped him with his pain  Patient did undergo physical exam today in the office  At this point in time he was told if he does not hear from her surgery within the next week to please call our office and we will try to help arrange evaluation  He will continue present medication otherwise return to our office approximately 6 months for re-evaluation    To call if any new problems or concerns

## 2022-09-09 NOTE — ASSESSMENT & PLAN NOTE
With the patient's BMI he is considered overweight  He does have a restriction as far as his exercise capacity is concerned because of his chronic low back pain  He also admits that he is not always conscientious as far as his dietary intake  He was told to look very closely the amount of calories he is taking in on a daily basis but we do not prescribe any specific exercise program this point until he has correction of his abnormalities to his low back

## 2022-09-09 NOTE — ASSESSMENT & PLAN NOTE
Patient remains on Crestor 10 mg daily  Cholesterol is controlled  Slight elevation in LDL  Was told he still needs to watch his intake of fats and cholesterol

## 2022-09-09 NOTE — PROGRESS NOTES
Assessment/Plan:    Healthcare maintenance  Patient is a 72-year-old male with a history of medical problems as outlined previously who is here today for a routine physical   He did have labs performed prior visit today and we did discuss the results  Patient states in general doing well is despite significant findings with recent MRI of his lumbar spine showing diffuse and severe disease which need to be addressed by neuro surgery  He states at this point he seems to be stable taking the Mobic on a daily basis which he states has helped him with his pain  Patient did undergo physical exam today in the office  At this point in time he was told if he does not hear from her surgery within the next week to please call our office and we will try to help arrange evaluation  He will continue present medication otherwise return to our office approximately 6 months for re-evaluation  To call if any new problems or concerns    Sensorineural hearing loss (SNHL) of both ears  Has been fitted for hearing aids and states they do help but still feels that he is not perfect as far as auditory acuity  Familial hypercholesterolemia  Patient remains on Crestor 10 mg daily  Cholesterol is controlled  Slight elevation in LDL  Was told he still needs to watch his intake of fats and cholesterol  Chronic right-sided low back pain with sciatica  Did discuss with the patient results of his MRI showing severe disease  Consult has been placed for the patient be seen by neuro surgery for evaluation and we could predict that he most likely will need surgery in the future  Patient states he is stable at this point time taking as nonsteroidal anti-inflammatory with no stomach upset  Overweight  With the patient's BMI he is considered overweight  He does have a restriction as far as his exercise capacity is concerned because of his chronic low back pain    He also admits that he is not always conscientious as far as his dietary intake  He was told to look very closely the amount of calories he is taking in on a daily basis but we do not prescribe any specific exercise program this point until he has correction of his abnormalities to his low back  KEVIN (obstructive sleep apnea)  Compliant with CPAP       Diagnoses and all orders for this visit:    Familial hypercholesterolemia  -     Lipid panel; Future    Chronic right-sided low back pain with right-sided sciatica  -     AMB E-CONSULT TO NEUROSURGERY    Healthcare maintenance    Sensorineural hearing loss (SNHL) of both ears    Overweight    KEVIN (obstructive sleep apnea)          Subjective:      Patient ID: Amy Jones is a 62 y o  male  71-year-old male history of medical problems outlined previously who is here today for a physical also discuss results of recent MRI of his lumbar spine  Patient states at this point time is back pain seems to be under control but he is extremely careful about his activity level  He did have extensive lab testing performed prior to the visit today we did discuss the results also results of recent MRI of his lumbar spine      The following portions of the patient's history were reviewed and updated as appropriate: He  has no past medical history on file  He   Patient Active Problem List    Diagnosis Date Noted    Other acute recurrent sinusitis 04/21/2022    Sensorineural hearing loss (SNHL) of both ears 12/08/2020    Familial hypercholesterolemia 08/14/2020    Decreased hearing of both ears 08/14/2020    Chronic right-sided low back pain with sciatica 12/05/2019    Fatigue 12/05/2018    Overweight 12/05/2018    Postnasal drip 12/05/2018    Mood disturbance 12/05/2018    KEVIN (obstructive sleep apnea)     Snoring     Daytime sleepiness     Left groin mass 06/26/2018    Healthcare maintenance 06/26/2018     He  has no past surgical history on file  His family history is not on file  He  reports that he has never smoked   He has never used smokeless tobacco  He reports current alcohol use  He reports that he does not use drugs  Current Outpatient Medications   Medication Sig Dispense Refill    meloxicam (Mobic) 15 mg tablet Take 1 tablet (15 mg total) by mouth daily 30 tablet 5    methylPREDNISolone 4 MG tablet therapy pack Use as directed on package 21 each 0    rosuvastatin (CRESTOR) 10 MG tablet Take 1 tablet (10 mg total) by mouth daily 90 tablet 3     No current facility-administered medications for this visit  Current Outpatient Medications on File Prior to Visit   Medication Sig    meloxicam (Mobic) 15 mg tablet Take 1 tablet (15 mg total) by mouth daily    methylPREDNISolone 4 MG tablet therapy pack Use as directed on package    rosuvastatin (CRESTOR) 10 MG tablet Take 1 tablet (10 mg total) by mouth daily     No current facility-administered medications on file prior to visit  He has No Known Allergies       Review of Systems   Constitutional: Negative  HENT: Negative  Eyes: Negative  Respiratory: Negative  Cardiovascular: Negative  Gastrointestinal: Negative  Endocrine: Negative  Genitourinary: Negative  Musculoskeletal: Positive for back pain  Negative for arthralgias, gait problem, joint swelling, myalgias, neck pain and neck stiffness  Some intermittent back pain and discomfort but not  severe or disabling at this point   Skin: Negative  Allergic/Immunologic: Negative  Neurological: Negative  Hematological: Negative  Psychiatric/Behavioral: Negative  Objective:      /60 (BP Location: Left arm, Patient Position: Sitting, Cuff Size: Adult)   Pulse 64   Temp (!) 97 °F (36 1 °C) (Tympanic)   Resp 18   Ht 5' 10" (1 778 m)   Wt 93 9 kg (207 lb)   SpO2 98%   BMI 29 70 kg/m²          Physical Exam  Vitals and nursing note reviewed  Constitutional:       General: He is not in acute distress  Appearance: Normal appearance   He is not ill-appearing, toxic-appearing or diaphoretic  Comments: Overweight 81-year-old male who is awake alert in no acute distress oriented x3  HENT:      Head: Normocephalic and atraumatic  Right Ear: Tympanic membrane, ear canal and external ear normal  There is no impacted cerumen  Left Ear: Tympanic membrane, ear canal and external ear normal  There is no impacted cerumen  Ears:      Comments: Bilateral hearing aids     Nose: Nose normal  No congestion or rhinorrhea  Mouth/Throat:      Mouth: Mucous membranes are moist       Pharynx: Oropharynx is clear  No oropharyngeal exudate or posterior oropharyngeal erythema  Eyes:      General: No scleral icterus  Right eye: No discharge  Left eye: No discharge  Extraocular Movements: Extraocular movements intact  Conjunctiva/sclera: Conjunctivae normal       Pupils: Pupils are equal, round, and reactive to light  Neck:      Vascular: No carotid bruit  Cardiovascular:      Rate and Rhythm: Normal rate and regular rhythm  Pulses: Normal pulses  Heart sounds: Normal heart sounds  No murmur heard  No friction rub  No gallop  Pulmonary:      Effort: Pulmonary effort is normal  No respiratory distress  Breath sounds: Normal breath sounds  No stridor  No wheezing, rhonchi or rales  Chest:      Chest wall: No tenderness  Abdominal:      General: Bowel sounds are normal  There is no distension  Palpations: Abdomen is soft  There is no mass  Tenderness: There is no abdominal tenderness  There is no right CVA tenderness, left CVA tenderness, guarding or rebound  Hernia: No hernia is present  Comments: Overweight   Genitourinary:     Penis: Normal        Testes: Normal       Prostate: Normal       Rectum: Normal  Guaiac result negative        Comments: Moderately enlarged prostate with no tenderness to palpation no nodule masses  Musculoskeletal:         General: Deformity (Deformity grossly on evaluation lumbar spine  We did not undergo maneuvers because the severity disease) present  No swelling, tenderness or signs of injury  Cervical back: Normal range of motion and neck supple  No rigidity or tenderness  Right lower leg: No edema  Left lower leg: No edema  Lymphadenopathy:      Cervical: No cervical adenopathy  Skin:     General: Skin is warm and dry  Capillary Refill: Capillary refill takes less than 2 seconds  Coloration: Skin is not jaundiced or pale  Findings: No bruising, erythema, lesion or rash  Neurological:      General: No focal deficit present  Mental Status: He is alert and oriented to person, place, and time  Mental status is at baseline  Cranial Nerves: No cranial nerve deficit  Sensory: No sensory deficit  Motor: No weakness  Coordination: Coordination normal       Gait: Gait normal       Deep Tendon Reflexes: Reflexes normal    Psychiatric:         Mood and Affect: Mood normal          Behavior: Behavior normal          Thought Content: Thought content normal          Judgment: Judgment normal          BMI Counseling: Body mass index is 29 7 kg/m²  The BMI is above normal  Nutrition recommendations include reducing portion sizes, decreasing overall calorie intake, 3-5 servings of fruits/vegetables daily, consuming healthier snacks, moderation in carbohydrate intake, increasing intake of lean protein, reducing intake of saturated fat and trans fat and reducing intake of cholesterol

## 2022-09-09 NOTE — ASSESSMENT & PLAN NOTE
Has been fitted for hearing aids and states they do help but still feels that he is not perfect as far as auditory acuity

## 2022-09-09 NOTE — PATIENT INSTRUCTIONS
Calorie Counting Diet   WHAT YOU NEED TO KNOW:   What is a calorie counting diet? It is a meal plan based on counting calories each day to reach a healthy body weight  You will need to eat fewer calories if you are trying to lose weight  Weight loss may decrease your risk for certain health problems or improve your health if you have health problems  Some of these health problems include heart disease, high blood pressure, and diabetes  What foods should I avoid? Your dietitian will tell you if you need to avoid certain foods based on your body weight and health condition  You may need to avoid high-fat foods if you are at risk for or have heart disease  You may need to eat fewer foods from the breads and starches food group if you have diabetes  How many calories are in foods? The following is a list of foods and drinks with the approximate number of calories in each  Check the food label to find the exact number of calories  A dietitian can tell you how many calories you should have from each food group each day  · Carbohydrate:      ? ½ of a 3-inch bagel, 1 slice of bread, or ½ of a hamburger bun or hot dog bun (80)    ? 1 (8-inch) flour tortilla or ½ cup of cooked rice (100)    ? 1 (6-inch) corn tortilla (80)    ? 1 (6-inch) pancake or 1 cup of bran flakes cereal (110)    ? ½ cup of cooked cereal (80)    ? ½ cup of cooked pasta (85)    ? 1 ounce of pretzels (100)    ? 3 cups of air-popped popcorn without butter or oil (80)    · Dairy:      ? 1 cup of skim or 1% milk (90)    ? 1 cup of 2% milk (120)    ? 1 cup of whole milk (160)    ? 1 cup of 2% chocolate milk (220)    ? 1 ounce of low-fat cheese with 3 grams of fat per ounce (70)    ? 1 ounce of cheddar cheese (114)    ? ½ cup of 1% fat cottage cheese (80)    ? 1 cup of plain or sugar-free, fat-free yogurt (90)    · Protein foods:      ? 3 ounces of fish (not breaded or fried) (95)    ? 3 ounces of breaded, fried fish (195)    ?  ¾ cup of tuna canned in water (105)    ? 3 ounces of chicken breast without skin (105)    ? 1 fried chicken breast with skin (350)    ? ¼ cup of fat free egg substitute (40)    ? 1 large egg (75)    ? 3 ounces of lean beef or pork (165)    ? 3 ounces of fried pork chop or ham (185)    ? ½ cup of cooked dried beans, such as kidney, salcido, lentils, or navy (115)    ? 3 ounces of bologna or lunch meat (225)    ? 2 links of breakfast sausage (140)    · Vegetables:      ? ½ cup of sliced mushrooms (10)    ? 1 cup of salad greens, such as lettuce, spinach, or alma (15)    ? ½ cup of steamed asparagus (20)    ? ½ cup of cooked summer squash, zucchini squash, or green or wax beans (25)    ? 1 cup of broccoli or cauliflower florets, or 1 medium tomato (25)    ? 1 large raw carrot or ½ cup of cooked carrots (40)    ? ? of a medium cucumber or 1 stalk of celery (5)    ? 1 small baked potato (160)    ? 1 cup of breaded, fried vegetables (230)    · Fruit:      ? 1 (6-inch) banana (55)     ? ½ of a 4-inch grapefruit (55)    ? 15 grapes (60)    ? 1 medium orange or apple (70)    ? 1 large peach (65)    ? 1 cup of fresh pineapple chunks (75)    ? 1 cup of melon cubes (50)    ? 1¼ cups of whole strawberries (45)    ? ½ cup of fruit canned in juice (55)    ? ½ cup of fruit canned in heavy syrup (110)    ? ? cup of raisins (130)    ? ½ cup of unsweetened fruit juice (60)    ? ½ cup of grape, cranberry, or prune juice (90)    · Fat:      ? 10 peanuts or 2 teaspoons of peanut butter (55)    ? 2 tablespoons of avocado or 1 tablespoon of regular salad dressing (45)    ? 2 slices of bejarano (90)    ? 1 teaspoon of oil, such as safflower, canola, corn, or olive oil (45)    ? 2 teaspoons of low-fat margarine, or 1 tablespoon of low-fat mayonnaise (50)    ? 1 teaspoon of regular margarine (40)    ? 1 tablespoon of regular mayonnaise (135)    ? 1 tablespoon of cream cheese or 2 tablespoons of low-fat cream cheese (45)    ?  2 tablespoons of vegetable shortening (215)    · Dessert and sweets:      ? 8 animal crackers or 5 vanilla wafers (80)    ? 1 frozen fruit juice bar (80)    ? ½ cup of ice milk or low-fat frozen yogurt (90)    ? ½ cup of sherbet or sorbet (125)    ? ½ cup of sugar-free pudding or custard (60)    ? ½ cup of ice cream (140)    ? ½ cup of pudding or custard (175)    ? 1 (2-inch) square chocolate brownie (185)    · Combination foods:      ? Bean burrito made with an 8-inch tortilla, without cheese (275)    ? Chicken breast sandwich with lettuce and tomato (325)    ? 1 cup of chicken noodle soup (60)    ? 1 beef taco (175)    ? Regular hamburger with lettuce and tomato (310)    ? Regular cheeseburger with lettuce and tomato (410)     ? ¼ of a 12-inch cheese pizza (280)    ? Fried fish sandwich with lettuce and tomato (425)    ? Hot dog and bun (275)    ? 1½ cups of macaroni and cheese (310)    ? Taco salad with a fried tortilla shell (870)    · Low-calorie foods:      ? 1 tablespoon of ketchup or 1 tablespoon of fat free sour cream (15)    ? 1 teaspoon of mustard (5)    ? ¼ cup of salsa (20)    ? 1 large dill pickle (15)    ? 1 tablespoon of fat free salad dressing (10)    ? 2 teaspoons of low-sugar, light jam or jelly, or 1 tablespoon of sugar-free syrup (15)    ? 1 sugar-free popsicle (15)    ? 1 cup of club soda, seltzer water, or diet soda (0)    CARE AGREEMENT:   You have the right to help plan your care  Discuss treatment options with your healthcare provider to decide what care you want to receive  You always have the right to refuse treatment  The above information is an  only  It is not intended as medical advice for individual conditions or treatments  Talk to your doctor, nurse or pharmacist before following any medical regimen to see if it is safe and effective for you  © Copyright [employer] 2022 Information is for End User's use only and may not be sold, redistributed or otherwise used for commercial purposes   All illustrations and images included in CareNotes® are the copyrighted property of [employer]  or Mary Jo Catrer

## 2022-09-09 NOTE — ASSESSMENT & PLAN NOTE
Did discuss with the patient results of his MRI showing severe disease  Consult has been placed for the patient be seen by neuro surgery for evaluation and we could predict that he most likely will need surgery in the future  Patient states he is stable at this point time taking as nonsteroidal anti-inflammatory with no stomach upset

## 2022-09-12 ENCOUNTER — TELEPHONE (OUTPATIENT)
Dept: INTERNAL MEDICINE CLINIC | Facility: CLINIC | Age: 62
End: 2022-09-12

## 2022-09-12 ENCOUNTER — TELEPHONE (OUTPATIENT)
Dept: NEUROSURGERY | Facility: CLINIC | Age: 62
End: 2022-09-12

## 2022-09-12 DIAGNOSIS — G89.29 CHRONIC RIGHT-SIDED LOW BACK PAIN WITH RIGHT-SIDED SCIATICA: Primary | ICD-10-CM

## 2022-09-12 DIAGNOSIS — M54.41 CHRONIC RIGHT-SIDED LOW BACK PAIN WITH RIGHT-SIDED SCIATICA: Primary | ICD-10-CM

## 2022-09-12 NOTE — TELEPHONE ENCOUNTER
Neurosurgery called and can see the patient is 2 to 3 weeks and asked if you can please place a doctor referral on epic   Thank you

## 2022-09-12 NOTE — TELEPHONE ENCOUNTER
Per E-consult from Dr Francisco Silveira:      Sadie Augustine MD   to Fran Norman, RN 9/9/2022  4:52 PM   Patient should see a physician within the next 2-3 weeks   Please help coordinate with PCPs office to arrange for a formal referral and appointment  LM at PCP office and routed inbasket message to the intake team and Dr Maico Andrews informing them of Dr Sienna Souza recommendation

## 2022-09-19 ENCOUNTER — CONSULT (OUTPATIENT)
Dept: NEUROSURGERY | Facility: CLINIC | Age: 62
End: 2022-09-19
Payer: COMMERCIAL

## 2022-09-19 VITALS
OXYGEN SATURATION: 97 % | SYSTOLIC BLOOD PRESSURE: 142 MMHG | HEIGHT: 71 IN | BODY MASS INDEX: 28 KG/M2 | TEMPERATURE: 98.3 F | DIASTOLIC BLOOD PRESSURE: 84 MMHG | WEIGHT: 200 LBS | HEART RATE: 71 BPM

## 2022-09-19 DIAGNOSIS — G89.29 CHRONIC RIGHT-SIDED LOW BACK PAIN WITH RIGHT-SIDED SCIATICA: Primary | ICD-10-CM

## 2022-09-19 DIAGNOSIS — M54.50 LOWER BACK PAIN: ICD-10-CM

## 2022-09-19 DIAGNOSIS — M54.41 CHRONIC RIGHT-SIDED LOW BACK PAIN WITH RIGHT-SIDED SCIATICA: Primary | ICD-10-CM

## 2022-09-19 PROCEDURE — 99243 OFF/OP CNSLTJ NEW/EST LOW 30: CPT | Performed by: NEUROLOGICAL SURGERY

## 2022-10-11 PROBLEM — J01.81 OTHER ACUTE RECURRENT SINUSITIS: Status: RESOLVED | Noted: 2022-04-21 | Resolved: 2022-10-11

## 2022-11-11 ENCOUNTER — CONSULT (OUTPATIENT)
Dept: PAIN MEDICINE | Facility: CLINIC | Age: 62
End: 2022-11-11

## 2022-11-11 VITALS
WEIGHT: 208 LBS | HEART RATE: 79 BPM | SYSTOLIC BLOOD PRESSURE: 138 MMHG | BODY MASS INDEX: 29.01 KG/M2 | DIASTOLIC BLOOD PRESSURE: 87 MMHG

## 2022-11-11 DIAGNOSIS — M54.41 CHRONIC RIGHT-SIDED LOW BACK PAIN WITH RIGHT-SIDED SCIATICA: ICD-10-CM

## 2022-11-11 DIAGNOSIS — M48.061 DEGENERATIVE LUMBAR SPINAL STENOSIS: Primary | ICD-10-CM

## 2022-11-11 DIAGNOSIS — G89.29 CHRONIC RIGHT-SIDED LOW BACK PAIN WITH RIGHT-SIDED SCIATICA: ICD-10-CM

## 2022-11-11 DIAGNOSIS — M46.1 SACROILIITIS (HCC): ICD-10-CM

## 2022-11-11 DIAGNOSIS — M47.816 LUMBAR SPONDYLOSIS: ICD-10-CM

## 2022-11-11 NOTE — PROGRESS NOTES
Assessment  1  Degenerative lumbar spinal stenosis    2  Chronic right-sided low back pain with right-sided sciatica    3  Lumbar spondylosis    4  Sacroiliitis ([address] )        Plan  The patient's symptoms, history/physical are consistent with pain that is multifactorial in origin but predominantly result of his multilevel degenerative spinal stenosis  Currently symptoms are under control  But I advised him that should symptoms worsen, he would be candidate for right-sided L5-S1 epidural steroid injection which is the area of the main pain generator for his right lower back pain  For now, I have recommended that he focus on core strengthening and using a back brace if he needs to do any significant lifting or yd work  He will follow back up as needed  My impressions and treatment recommendations were discussed in detail with the patient who verbalized understanding and had no further questions  Discharge instructions were provided  I personally saw and examined the patient and I agree with the above discussed plan of care  History of Present Illness    Kya Jnoes is a 65 y o  male referred by Dr Merritt Peacock for right lower back and leg pain that has been present for 4 months currently over a chronic history many years  Current symptoms are mild rated 1/10 on numeric rating scale and felt occasionally described to be dull aching sharp shooting with numbness and pressure-like symptoms that can radiate into the buttock  Very rarely does he get pain going into the right leg  Symptoms are aggravated with bending, sitting and relieved with lying down standing  Treatment history has included epidural steroid injections 30 years ago which were helpful  Physical therapy has provided moderate relief  He uses meloxicam 15 mg daily which is helpful      I have personally reviewed and/or updated the patient's past medical history, past surgical history, family history, social history, current medications, allergies, and vital signs today  Review of Systems   Constitutional: Negative for fever and unexpected weight change  HENT: Negative for trouble swallowing  Eyes: Negative for visual disturbance  Respiratory: Negative for shortness of breath and wheezing  Cardiovascular: Negative for chest pain and palpitations  Gastrointestinal: Negative for constipation, diarrhea, nausea and vomiting  Endocrine: Negative for cold intolerance, heat intolerance and polydipsia  Genitourinary: Negative for difficulty urinating and frequency  Musculoskeletal: Positive for back pain and gait problem  Negative for arthralgias, joint swelling and myalgias  Skin: Negative for rash  Neurological: Positive for numbness  Negative for dizziness, seizures, syncope, weakness and headaches  Hematological: Does not bruise/bleed easily  Psychiatric/Behavioral: Negative for dysphoric mood  All other systems reviewed and are negative  Patient Active Problem List   Diagnosis   • Left groin mass   • Healthcare maintenance   • KEVIN (obstructive sleep apnea)   • Snoring   • Daytime sleepiness   • Fatigue   • Overweight   • Postnasal drip   • Mood disturbance   • Chronic right-sided low back pain with sciatica   • Familial hypercholesterolemia   • Decreased hearing of both ears   • Sensorineural hearing loss (SNHL) of both ears       History reviewed  No pertinent past medical history  History reviewed  No pertinent surgical history  History reviewed  No pertinent family history  Social History     Occupational History   • Not on file   Tobacco Use   • Smoking status: Never Smoker   • Smokeless tobacco: Never Used   Substance and Sexual Activity   • Alcohol use:  Yes   • Drug use: No   • Sexual activity: Not on file       Current Outpatient Medications on File Prior to Visit   Medication Sig   • meloxicam (Mobic) 15 mg tablet Take 1 tablet (15 mg total) by mouth daily   • rosuvastatin (CRESTOR) 10 MG tablet Take 1 tablet (10 mg total) by mouth daily     No current facility-administered medications on file prior to visit  No Known Allergies    Physical Exam    /87   Pulse 79   Wt 94 3 kg (208 lb)   BMI 29 01 kg/m²     Constitutional: normal, well developed, well nourished, alert, in no distress and non-toxic and no overt pain behavior  Eyes: anicteric  HEENT: grossly intact  Neck: supple, symmetric, trachea midline and no masses   Pulmonary:even and unlabored  Cardiovascular:No edema or pitting edema present  Skin:Normal without rashes or lesions and well hydrated  Psychiatric:Mood and affect appropriate  Neurologic:Cranial Nerves II-XII grossly intact  Musculoskeletal:normal     Lumbar Spine Exam  Appearance:  Normal lordosis  Palpation/Tenderness:  no tenderness or spasm  Range of Motion:  Flexion:  No limitation  without pain  Extension:  Minimally limited  with pain  Motor Strength:  Left hip flexion:  5/5  Left hip extension:  5/5  Right hip flexion:  5/5  Right hip extension:  5/5  Left knee flexion:  5/5  Left knee extension:  5/5  Right knee flexion:  5/5  Right knee extension:  5/5  Left foot dorsiflexion:  5/5  Left foot plantar flexion:  5/5  Right foot dorsiflexion:  5/5  Right foot plantar flexion:  5/5  Reflexes:  Left Patellar:  2+   Right Patellar:  2+   Left Achilles:  2+   Right Achilles:  2+   Special Tests:  Left Straight Leg Test:  negative  Right Straight Leg Test:  negative    Imaging    MRI LUMBAR SPINE WITH AND WITHOUT CONTRAST (9/4/2022)     INDICATION: M54 41: Lumbago with sciatica, right side  G89 29: Other chronic pain      COMPARISON:  None      TECHNIQUE:  Sagittal T1, sagittal T2, sagittal inversion recovery, axial T1 and axial T2, coronal T2  Sagittal and axial T1 postcontrast      IV Contrast:  9 mL of Gadobutrol injection (SINGLE-DOSE)      IMAGE QUALITY:  Diagnostic     FINDINGS:     VERTEBRAL BODIES:  There are 5 lumbar type vertebral bodies  Normal alignment of the lumbar spine  No spondylolysis or spondylolisthesis  No scoliosis  No compression fracture  Normal marrow signal is identified within the visualized bony   structures  No discrete marrow lesion      SACRUM:  Normal signal within the sacrum  No evidence of insufficiency or stress fracture      DISTAL CORD AND CONUS:  Minor focal edema appears to be present within the conus at the level of the L1 vertebral body as a result of an extruded disc herniation with mass effect upon the thecal sac and conus  See series 6 image 1      PARASPINAL SOFT TISSUES:  Paraspinal soft tissues are unremarkable      LOWER THORACIC DISC SPACES:  Minor lower thoracic annular bulging  No significant canal stenosis or foraminal narrowing      LUMBAR DISC SPACES:  Mild developmental canal stenosis within the lumbar canal      L1-L2:  At the L1-2 level there is disc desiccation with slight loss of disc height and mild diffuse annular bulging  There is a right paramedian disc herniation which is significantly extruded superiorly behind the L1 vertebral body into the lateral   recess  Central annular tear extends across the midline into the left paramedian aspect of the disc  There is also a small left foraminal disc protrusion  There is moderate primarily right-sided canal stenosis and moderate bilateral foraminal   narrowing is present      L2-L3:  Loss of disc height with mild diffuse annular bulging and a small inferiorly extruded slightly right paramedian disc herniation  There is moderately severe primarily right-sided canal stenosis with mass effect upon the thecal sac and slight   redundancy of the cauda equina nerve roots below the level of this disc space  Mild bilateral foraminal narrowing      L3-L4:  Mild annular bulging with a small left paramedian disc herniation  Left-sided canal stenosis with slight distortion of the thecal sac  Possible mass effect upon the left L4 nerve    Mild bilateral foraminal narrowing      L4-L5:  Disc desiccation with slight loss of disc height  Diffuse annular bulging with a small broad-based central disc protrusion  Moderate central canal stenosis and mild to moderate bilateral foraminal narrowing      L5-S1:  Loss of disc height with diffuse annular bulging  Broad-based central disc protrusion slightly more prominent to the left of midline  There is mass effect upon the S1 nerves as they exit the thecal sac  There is severe bilateral foraminal   narrowing with superior compression of both exiting nerves      POSTCONTRAST IMAGING:  Peripheral enhancement of the large disc extrusion at the L1-2 level  Mild enhancement peripherally of the smaller L2-3 and L3-4 disc herniations      IMPRESSION:     Developmental spinal canal stenosis exacerbated by acquired degenerative disc disease      At L1-2 there is a large right-sided superiorly extruded disc herniation and a small left foraminal disc herniation      L2-3 demonstrates a moderate right paramedian disc extrusion with significant mass effect upon the thecal sac    Smaller disc herniations at L3-4 and L4-5      Severe bilateral foraminal narrowing at L5-S1 as a result of diffuse annular bulging and central disc protrusion      Spine surgery consultation recommended        This examination was marked "immediate notification" in Epic in order to begin the standard process by which the radiology reading room liaison alerts the referring practitioner

## 2022-11-11 NOTE — PATIENT INSTRUCTIONS
Lumbar Spinal Stenosis   WHAT YOU NEED TO KNOW:   What is lumbar spinal stenosis? Lumbar spinal stenosis is narrowing of the spinal canal in your lower back  Your spinal canal is the opening in your spine that contains your spinal cord  When your spinal canal narrows, it may put pressure on your spinal cord and nerves  What causes lumbar spinal stenosis? Narrowing of your spinal canal may be caused by changes that happen as you age  These changes include bone spurs (growths), herniated discs, and thickened ligaments  Bone growths can be caused by osteoarthritis  A herniated disc bulges out between the vertebrae (bones) and into your spinal canal  Discs are spongy cushions between the vertebrae in your spine  Herniated discs may be caused by activities that increase stress on the spine  An example is heavy lifting  Ligaments that connect the vertebrae may thicken and harden as you get older  Other conditions, such as spinal injuries and Paget's disease, can also cause spinal stenosis  What are the signs and symptoms of lumbar spinal stenosis? Signs and symptoms may start or get worse when you stand or walk  They are often relieved when you sit or lean forward  Low back pain    Pain, numbness, tingling, or weakness in one or both legs    Pain in your buttocks that extends to your thighs or legs    How is lumbar spinal stenosis diagnosed? Your healthcare provider will ask about your symptoms and when they started  He or she will ask if you have any medical conditions  Your provider may ask you to lift, bend, walk, sit, or reach  An x-ray, MRI or a CT may show problems in your spine that are causing spinal stenosis  You may be given contrast liquid to help the spine show up better in the pictures  Tell the healthcare provider if you have ever had an allergic reaction to contrast liquid  Do not enter the MRI room with anything metal  Metal can cause serious injury   Tell the healthcare provider if you have any metal in or on your body  How is lumbar spinal stenosis treated? NSAIDs , such as ibuprofen, help decrease swelling, pain, and fever  NSAIDs can cause stomach bleeding or kidney problems in certain people  If you take blood thinner medicine, always ask your healthcare provider if NSAIDs are safe for you  Always read the medicine label and follow directions  Acetaminophen  decreases pain and fever  It is available without a doctor's order  Ask how much to take and how often to take it  Follow directions  Read the labels of all other medicines you are using to see if they also contain acetaminophen, or ask your doctor or pharmacist  Acetaminophen can cause liver damage if not taken correctly  Do not use more than 4 grams (4,000 milligrams) total of acetaminophen in one day  Prescription pain medicine  may be given  Ask how to take this medicine safely  Muscle relaxers  help decrease pain and muscle spasms  A steroid injection  may be given to reduce inflammation  Steroid medicine is injected into the epidural space  The epidural space is between your spinal cord and vertebrae  A nerve block  is an injection of numbing medicine  You may need a nerve block if your pain is not going away, or is getting worse  Surgery  may be needed to widen your spinal canal or decrease pressure on your spinal cord  Surgery may also be done to fix damaged or injured vertebrae in your back  How can I manage my symptoms? Go to physical and occupational therapy  as directed  A physical therapist teaches you exercises to help improve movement and strength, and to decrease pain  An occupational therapist teaches you skills to help with your daily activities  Rest  when you feel it is needed  Slowly start to do more each day  Return to your daily activities as directed  Apply heat on your back for 20 to 30 minutes every 2 hours for as many days as directed  Heat helps decrease pain and muscle spasms  Apply ice  on your back for 15 to 20 minutes every hour or as directed  Use an ice pack, or put crushed ice in a plastic bag  Cover it with a towel before you apply it to your skin  Ice helps prevent tissue damage and decreases swelling and pain  When should I seek immediate care? You have pain in your leg that does not go away or gets worse  You have trouble moving your legs  You cannot control when you urinate or have a bowel movement  When should I contact my healthcare provider? You have new or worsening symptoms  Your symptoms keep you from doing your daily activities  You have questions or concerns about your condition or care  CARE AGREEMENT:   You have the right to help plan your care  Learn about your health condition and how it may be treated  Discuss treatment options with your healthcare providers to decide what care you want to receive  You always have the right to refuse treatment  The above information is an  only  It is not intended as medical advice for individual conditions or treatments  Talk to your doctor, nurse or pharmacist before following any medical regimen to see if it is safe and effective for you  © Copyright [employer] 2022 Information is for End User's use only and may not be sold, redistributed or otherwise used for commercial purposes   All illustrations and images included in CareNotes® are the copyrighted property of [employer]  or [address] [place] [address] [employer]

## 2022-12-02 ENCOUNTER — OFFICE VISIT (OUTPATIENT)
Dept: SLEEP CENTER | Facility: CLINIC | Age: 62
End: 2022-12-02

## 2022-12-02 VITALS
DIASTOLIC BLOOD PRESSURE: 87 MMHG | BODY MASS INDEX: 30.64 KG/M2 | HEIGHT: 70 IN | HEART RATE: 72 BPM | WEIGHT: 214 LBS | SYSTOLIC BLOOD PRESSURE: 150 MMHG

## 2022-12-02 DIAGNOSIS — J34.2 DEVIATED NASAL SEPTUM: ICD-10-CM

## 2022-12-02 DIAGNOSIS — E66.3 OVERWEIGHT (BMI 25.0-29.9): ICD-10-CM

## 2022-12-02 DIAGNOSIS — G47.09 OTHER INSOMNIA: ICD-10-CM

## 2022-12-02 DIAGNOSIS — G47.61 PLMD (PERIODIC LIMB MOVEMENT DISORDER): ICD-10-CM

## 2022-12-02 DIAGNOSIS — G47.19 EXCESSIVE DAYTIME SLEEPINESS: ICD-10-CM

## 2022-12-02 DIAGNOSIS — G47.33 OSA (OBSTRUCTIVE SLEEP APNEA): Primary | ICD-10-CM

## 2022-12-02 NOTE — PATIENT INSTRUCTIONS

## 2022-12-02 NOTE — PROGRESS NOTES
Review of Systems      Genitourinary need to urinate more than twice a night   Cardiology ankle/leg swelling   Gastrointestinal none   Neurology none   Constitutional none   Integumentary none   Psychiatry none   Musculoskeletal back pain   Pulmonary none   ENT none   Endocrine none   Hematological none

## 2022-12-02 NOTE — PROGRESS NOTES
Follow-Up Note - [facility]   Clarissa Matias  65 y o  male  ADHIKARI:[id number]  FHX:[id number]  DOS:12/2/2022    CC: I saw this patient for follow-up in clinic today for Sleep disordered breathing, Coexisting Sleep and Medical Problems  Diagnostic study in July of 2022 demonstrated mild obstructive sleep apnea: AHI 6 7 /hour , higher while supine  Moderate intensity  snoring  was noted  Minimum oxygen saturation 88 %  and 0 1 minute of total sleep time was spent with saturations less than 90%  There were periodic limb movements of sleep for an index of 26 per hour but rarely associated with arousal       PFSH, Problem List, Medications & Allergies were reviewed in EMR  Interval changes: none reported  He  has no past medical history on file  He has a current medication list which includes the following prescription(s): meloxicam and rosuvastatin  PHYSIOLOGICAL DATA REVIEW AND INTERPRETATION:    using PAP > 4 hours/night 97%  Estimated HERMES 0 3/hour with pressure of 7cm H2O @90th/95th percentile; Patient has not been using ozone based devices to sanitize the machine  SUBJECTIVE: Regarding use of PAP, Jose reports:   · minimal adverse effects: dry mouth/throat; has not noticed any fibres or foreign material in air line  · He is benefiting from use: sleeping better   · He is not aware of jerking movements during sleep  Sleep Routine: Jose reports getting 7 hrs sleep  ; he has no difficulty initiating, but reports diffculty maintaining sleep   He arises needing an alarm and feels more refreshed since on Rx  Jose reports somewhat improved Excessive Daytime Sleepiness,   He rated himself at Total score: 1 /24 on the Thelma Sleepiness Scale  Habits: reports that he has never smoked  He has never used smokeless tobacco ,  reports current alcohol use ,  reports no history of drug use , Caffeine use:moderate until around 5:00 p m ; Exercise routine: irregular   ROS: reviewed & as attached  Significant for some weight gain  He reported no nasal, respiratory or cardiac symptoms  Back pain and radicular symptoms are controlled  Iraj Pipe EXAM: /87 (BP Location: Left arm, Patient Position: Sitting, Cuff Size: Large)   Pulse 72   Ht 5' 10" (1 778 m)   Wt 97 1 kg (214 lb)   BMI 30 71 kg/m²     Wt Readings from Last 3 Encounters:   12/02/22 97 1 kg (214 lb)   11/11/22 94 3 kg (208 lb)   09/19/22 90 7 kg (200 lb)      Patient is well groomed; well appearing  CNS: Alert, orientated, clear & coherent speech  Psych: cooperativeand in no distress  Mental state:appears normal   H&N: EOMI; NC/AT:no facial pressure marks, no rashes  Skin/Extrem: col & hydration normal; no edema  Resp: Respiratory effort is normal  Physical findings otherwise essentially unchanged from previous  IMPRESSION: Problem List Items & Comorbidities Addressed this Visit    1  KEVIN (obstructive sleep apnea)  PAP DME Pressure Change    PAP DME Resupply/Reorder      2  Other insomnia        3  PLMD (periodic limb movement disorder)        4  Excessive daytime sleepiness        5  Deviated nasal septum        6  Overweight (BMI 25 0-29  9)            PLAN:  1  I reviewed results of prior studies and physiologic data with the patient  2  I discussed treatment options with risks and benefits  3  Treatment with  PAP is medically necessary and Jose is agreable to continue use  4  Care of equipment, methods to improve comfort using PAP and importance of compliance with therapy were discussed  5  Pressure setting: adjust 5-10 cmH2O     6  Rx provided to replace  supplies and Care coordinated with DME provider  7  I have not initiated any treatment for periodic limb movements of sleep at this time  8  Multi component Cognitive behavioral therapy for Insomnia undertaken - Sleep Restriction, Stimulus control, Relaxation techniques and Sleep hygiene were discussed  9  If sleep difficulties persist, a trial of gabapentin q h s  or low-dose doxepin can be considered  10  Discussed strategies for weight reduction  11  Follow-up is advised in 1 year or sooner if needed to monitor progress, compliance and to adjust therapy  Thank you for allowing me to participate in the care of this patient  Sincerely,     Authenticated electronically on 16/10/91   Board Certified Specialist     Portions of the record may have been created with voice recognition software  Occasional wrong word or "sound a like" substitutions may have occurred due to the inherent limitations of voice recognition software  There may also be notations and random deletions of words or characters from malfunctioning software  Read the chart carefully and recognize, using context, where substitutions/deletions have occurred

## 2022-12-05 ENCOUNTER — TELEPHONE (OUTPATIENT)
Dept: SLEEP CENTER | Facility: CLINIC | Age: 62
End: 2022-12-05

## 2022-12-05 LAB

## 2022-12-27 DIAGNOSIS — M54.41 CHRONIC RIGHT-SIDED LOW BACK PAIN WITH RIGHT-SIDED SCIATICA: ICD-10-CM

## 2022-12-27 DIAGNOSIS — G89.29 CHRONIC RIGHT-SIDED LOW BACK PAIN WITH RIGHT-SIDED SCIATICA: ICD-10-CM

## 2022-12-28 RX ORDER — MELOXICAM 15 MG/1
15 TABLET ORAL DAILY
Qty: 30 TABLET | Refills: 0 | Status: SHIPPED | OUTPATIENT
Start: 2022-12-28

## 2023-02-08 ENCOUNTER — TELEPHONE (OUTPATIENT)
Dept: SLEEP CENTER | Facility: CLINIC | Age: 63
End: 2023-02-08

## 2023-02-08 NOTE — TELEPHONE ENCOUNTER
Received fax from [facility] dated 2/3/23 stating:   "Patient is unreachable and cannot proceed with the pressure change  You may inform the patient to reach us at [phone] option 1 then option 3  Thank you"  Called patient and left message advising of above

## 2023-03-03 DIAGNOSIS — M54.41 CHRONIC RIGHT-SIDED LOW BACK PAIN WITH RIGHT-SIDED SCIATICA: ICD-10-CM

## 2023-03-03 DIAGNOSIS — G89.29 CHRONIC RIGHT-SIDED LOW BACK PAIN WITH RIGHT-SIDED SCIATICA: ICD-10-CM

## 2023-03-03 RX ORDER — MELOXICAM 15 MG/1
TABLET ORAL
Qty: 30 TABLET | Refills: 5 | Status: SHIPPED | OUTPATIENT
Start: 2023-03-03

## 2023-03-04 ENCOUNTER — APPOINTMENT (OUTPATIENT)
Dept: LAB | Facility: CLINIC | Age: 63
End: 2023-03-04

## 2023-03-04 DIAGNOSIS — E78.01 FAMILIAL HYPERCHOLESTEROLEMIA: ICD-10-CM

## 2023-03-04 LAB
CHOLEST SERPL-MCNC: 215 MG/DL
HDLC SERPL-MCNC: 49 MG/DL
LDLC SERPL CALC-MCNC: 140 MG/DL (ref 0–100)
NONHDLC SERPL-MCNC: 166 MG/DL
TRIGL SERPL-MCNC: 130 MG/DL

## 2023-03-10 ENCOUNTER — OFFICE VISIT (OUTPATIENT)
Dept: INTERNAL MEDICINE CLINIC | Facility: CLINIC | Age: 63
End: 2023-03-10

## 2023-03-10 VITALS
DIASTOLIC BLOOD PRESSURE: 60 MMHG | OXYGEN SATURATION: 96 % | HEIGHT: 70 IN | RESPIRATION RATE: 19 BRPM | BODY MASS INDEX: 30.92 KG/M2 | TEMPERATURE: 97.6 F | HEART RATE: 83 BPM | WEIGHT: 216 LBS | SYSTOLIC BLOOD PRESSURE: 120 MMHG

## 2023-03-10 DIAGNOSIS — Z12.5 PROSTATE CANCER SCREENING: ICD-10-CM

## 2023-03-10 DIAGNOSIS — E78.01 FAMILIAL HYPERCHOLESTEROLEMIA: Primary | ICD-10-CM

## 2023-03-10 DIAGNOSIS — G89.29 CHRONIC RIGHT-SIDED LOW BACK PAIN WITH RIGHT-SIDED SCIATICA: ICD-10-CM

## 2023-03-10 DIAGNOSIS — G47.33 OSA (OBSTRUCTIVE SLEEP APNEA): ICD-10-CM

## 2023-03-10 DIAGNOSIS — Z00.00 HEALTHCARE MAINTENANCE: ICD-10-CM

## 2023-03-10 DIAGNOSIS — M54.41 CHRONIC RIGHT-SIDED LOW BACK PAIN WITH RIGHT-SIDED SCIATICA: ICD-10-CM

## 2023-03-10 RX ORDER — EZETIMIBE 10 MG/1
10 TABLET ORAL DAILY
Qty: 90 TABLET | Refills: 3 | Status: SHIPPED | OUTPATIENT
Start: 2023-03-10 | End: 2024-03-04

## 2023-03-10 NOTE — PROGRESS NOTES
Name: Aurea Ku      : 1960      MRN: [id number]  Encounter Provider: Vladislav San DO  Encounter Date: 3/10/2023   Encounter department: [facility]    Assessment & Plan     1  Familial hypercholesterolemia  Assessment & Plan:  Patient has a longstanding history of hyperlipidemia  Did have a lipid profile performed prior to the visit and as noted his cholesterol is now elevated  He admits that he is not always compliant as far as diet is concerned and states he is taking his medication Crestor 10 mg daily  We did have a long discussion about further treatment with the patient and decision today was in addition to the Crestor 10 mg daily to add on Zetia 10 mg  He was told that he should have no significant side effects from the medication and it will help boost the effects of the Crestor to keep his cholesterol under control  Discussed again as previously the importance of watching his intake of fats and cholesterol with his diet  Check a lipid profile in 6 months  Orders:  -     ezetimibe (ZETIA) 10 mg tablet; Take 1 tablet (10 mg total) by mouth daily  -     Lipid panel; Future    2  Chronic right-sided low back pain with right-sided sciatica  Assessment & Plan:  Patient relates he is taking the Mobic as needed to help with his back pain and discomfort but he is functional   No acute exacerbations  No weakness to lower extremities  We will continue present treatment and surveillance and told if any acute exacerbations to call immediately  3  Healthcare maintenance  Assessment & Plan:  Patient will be due for a yearly physical when he returns to the office in 6 months  He was given a slip for complete labs to be performed prior to that visit  In the interim he was told if any new medical problems or concerns to please call  Orders:  -     Comprehensive metabolic panel; Future  -     CBC and differential; Future  -     Lipid panel;  Future  -     UA (URINE) with reflex to Scope; Future; Expected date: 03/10/2023  -     Occult Blood, Fecal Immunochemical; Future  -     PSA, Total Screen; Future    4  Prostate cancer screening  -     PSA, Total Screen; Future    5  KEVIN (obstructive sleep apnea)  Assessment & Plan:  Patient is compliant with his CPAP  Continues to follow-up with sleep specialist              Subjective     Patient is a 58-year-old male history of multiple medical problems who is here today for follow-up after 6-month period of time  He did have labs performed prior to the visit today with looking at his cholesterol level  Patient states in general he is feeling well and offers no new complaints or concerns  He states he is extremely busy at his job but he enjoys it immensely and he feels this is been a good match for him    Review of Systems   Constitutional: Negative  HENT: Negative  Eyes: Negative  Respiratory: Negative  Cardiovascular: Negative  Gastrointestinal: Negative  Endocrine: Negative  Genitourinary: Negative  Musculoskeletal: Negative  Skin: Negative  Allergic/Immunologic: Negative  Neurological: Negative  Hematological: Negative  Psychiatric/Behavioral: Negative  History reviewed  No pertinent past medical history  History reviewed  No pertinent surgical history  History reviewed  No pertinent family history  Social History     Socioeconomic History   • Marital status: /Civil Union     Spouse name: None   • Number of children: None   • Years of education: None   • Highest education level: None   Occupational History   • None   Tobacco Use   • Smoking status: Never   • Smokeless tobacco: Never   Substance and Sexual Activity   • Alcohol use:  Yes   • Drug use: No   • Sexual activity: None   Other Topics Concern   • None   Social History Narrative   • None     Social Determinants of Health     Financial Resource Strain: Not on file   Food Insecurity: Not on file   Transportation Needs: Not on file   Physical Activity: Not on file   Stress: Not on file   Social Connections: Not on file   Intimate Partner Violence: Not on file   Housing Stability: Not on file     Current Outpatient Medications on File Prior to Visit   Medication Sig   • meloxicam (MOBIC) 15 mg tablet TAKE ONE TABLET BY MOUTH EVERY DAY   • rosuvastatin (CRESTOR) 10 MG tablet Take 1 tablet (10 mg total) by mouth daily     No Known Allergies  Immunization History   Administered Date(s) Administered   • COVID-19 PFIZER VACCINE 0 3 ML IM 04/14/2021, 05/05/2021, 01/24/2022   • INFLUENZA 12/09/2012, 09/02/2020, 09/16/2021   • Influenza, injectable, quadrivalent, preservative free 0 5 mL 09/04/2020   • Influenza, recombinant, quadrivalent,injectable, preservative free 10/13/2018   • Influenza, seasonal, injectable 10/12/2015, 10/07/2017   • Tdap 07/06/2017, 09/16/2021   • Zoster Vaccine Recombinant 09/02/2020, 09/04/2020, 11/09/2020       Objective     /60 (BP Location: Left arm, Patient Position: Sitting, Cuff Size: Adult)   Pulse 83   Temp 97 6 °F (36 4 °C) (Tympanic)   Resp 19   Ht 5' 10" (1 778 m)   Wt 98 kg (216 lb)   SpO2 96%   BMI 30 99 kg/m²     Physical Exam  Vitals and nursing note reviewed  Constitutional:       General: He is not in acute distress  Appearance: Normal appearance  He is obese  He is not ill-appearing, toxic-appearing or diaphoretic  Comments: Now obese t 73-year-old male who is awake alert in no acute distress oriented x3  HENT:      Head: Normocephalic and atraumatic  Right Ear: Tympanic membrane, ear canal and external ear normal  There is no impacted cerumen  Left Ear: Tympanic membrane, ear canal and external ear normal  There is no impacted cerumen  Ears:      Comments: Bilateral hearing aids     Nose: Nose normal  No congestion or rhinorrhea  Mouth/Throat:      Mouth: Mucous membranes are moist       Pharynx: Oropharynx is clear   No oropharyngeal exudate or posterior oropharyngeal erythema  Eyes:      General: No scleral icterus  Right eye: No discharge  Left eye: No discharge  Extraocular Movements: Extraocular movements intact  Conjunctiva/sclera: Conjunctivae normal       Pupils: Pupils are equal, round, and reactive to light  Neck:      Vascular: No carotid bruit  Cardiovascular:      Rate and Rhythm: Normal rate and regular rhythm  Pulses: Normal pulses  Heart sounds: Normal heart sounds  No murmur heard  No friction rub  No gallop  Pulmonary:      Effort: Pulmonary effort is normal  No respiratory distress  Breath sounds: Normal breath sounds  No stridor  No wheezing, rhonchi or rales  Chest:      Chest wall: No tenderness  Abdominal:      General: Bowel sounds are normal  There is no distension  Palpations: Abdomen is soft  There is no mass  Tenderness: There is no abdominal tenderness  There is no right CVA tenderness, left CVA tenderness, guarding or rebound  Hernia: No hernia is present  Comments: Overweight   Genitourinary:     Penis: Normal        Testes: Normal       Prostate: Normal       Rectum: Normal  Guaiac result negative  Comments: Moderately enlarged prostate with no tenderness to palpation no nodule masses  Musculoskeletal:         General: Deformity (Deformity grossly on evaluation lumbar spine  We did not undergo maneuvers because the severity disease) present  No swelling, tenderness or signs of injury  Cervical back: Normal range of motion and neck supple  No rigidity or tenderness  Right lower leg: No edema  Left lower leg: No edema  Comments: As previously some flattening to his lumbar curve  Patient has full range of motion but no pain with movement   Lymphadenopathy:      Cervical: No cervical adenopathy  Skin:     General: Skin is warm and dry  Capillary Refill: Capillary refill takes less than 2 seconds        Coloration: Skin is not jaundiced or pale  Findings: No bruising, erythema, lesion or rash  Neurological:      General: No focal deficit present  Mental Status: He is alert and oriented to person, place, and time  Mental status is at baseline  Cranial Nerves: No cranial nerve deficit  Sensory: No sensory deficit  Motor: No weakness  Coordination: Coordination normal       Gait: Gait normal       Deep Tendon Reflexes: Reflexes normal    Psychiatric:         Mood and Affect: Mood normal          Behavior: Behavior normal          Thought Content:  Thought content normal          Judgment: Judgment normal        Janice Shames, DO

## 2023-03-10 NOTE — ASSESSMENT & PLAN NOTE
Patient will be due for a yearly physical when he returns to the office in 6 months  He was given a slip for complete labs to be performed prior to that visit  In the interim he was told if any new medical problems or concerns to please call

## 2023-03-10 NOTE — ASSESSMENT & PLAN NOTE
Patient relates he is taking the Mobic as needed to help with his back pain and discomfort but he is functional   No acute exacerbations  No weakness to lower extremities  We will continue present treatment and surveillance and told if any acute exacerbations to call immediately

## 2023-03-10 NOTE — ASSESSMENT & PLAN NOTE
Patient has a longstanding history of hyperlipidemia  Did have a lipid profile performed prior to the visit and as noted his cholesterol is now elevated  He admits that he is not always compliant as far as diet is concerned and states he is taking his medication Crestor 10 mg daily  We did have a long discussion about further treatment with the patient and decision today was in addition to the Crestor 10 mg daily to add on Zetia 10 mg  He was told that he should have no significant side effects from the medication and it will help boost the effects of the Crestor to keep his cholesterol under control  Discussed again as previously the importance of watching his intake of fats and cholesterol with his diet  Check a lipid profile in 6 months

## 2023-03-16 ENCOUNTER — TELEPHONE (OUTPATIENT)
Dept: NEUROSURGERY | Facility: CLINIC | Age: 63
End: 2023-03-16

## 2023-03-16 NOTE — TELEPHONE ENCOUNTER
3/16/23- LMOM TO R/S CX'ED 3/20/23 F/U    Appointment canceled for Devante Vyas ([id number])   Visit Type: OFFICE VISIT SHORT [facility]   Date        Time      Length    Provider                  Department   3/20/2023    7:30 AM  30 mins   Preet Fritz PA-C        [facility] [place]      Reason for Cancellation: Canceled via MyChart

## 2023-08-10 DIAGNOSIS — E78.01 FAMILIAL HYPERCHOLESTEROLEMIA: ICD-10-CM

## 2023-08-10 RX ORDER — ROSUVASTATIN CALCIUM 10 MG/1
10 TABLET, COATED ORAL DAILY
Qty: 90 TABLET | Refills: 3 | Status: SHIPPED | OUTPATIENT
Start: 2023-08-10

## 2023-09-15 ENCOUNTER — NURSE TRIAGE (OUTPATIENT)
Dept: OTHER | Facility: OTHER | Age: 63
End: 2023-09-15

## 2023-09-15 DIAGNOSIS — U07.1 COVID-19: Primary | ICD-10-CM

## 2023-09-15 RX ORDER — NIRMATRELVIR AND RITONAVIR 300-100 MG
3 KIT ORAL 2 TIMES DAILY
Qty: 30 TABLET | Refills: 0 | Status: SHIPPED | OUTPATIENT
Start: 2023-09-15 | End: 2023-09-20

## 2023-09-15 NOTE — TELEPHONE ENCOUNTER
Answer Assessment - Initial Assessment Questions  1. COVID-19 DIAGNOSIS: "Who made your COVID-19 diagnosis?" "Was it confirmed by a positive lab test or self-test?" If not diagnosed by a doctor (or NP/PA), ask "Are there lots of cases (community spread) where you live?" Note: See public health department website, if unsure. Rapid home test positive today  2. COVID-19 EXPOSURE: "Was there any known exposure to COVID before the symptoms began?" CDC Definition of close contact: within 6 feet (2 meters) for a total of 15 minutes or more over a 24-hour period. Unspecified  3. ONSET: "When did the COVID-19 symptoms start?"       Yesterday evening  4. WORST SYMPTOM: "What is your worst symptom?" (e.g., cough, fever, shortness of breath, muscle aches)      Head congestion, sore throat, fever  5. COUGH: "Do you have a cough?" If Yes, ask: "How bad is the cough?"        Occasional mild cough  6. FEVER: "Do you have a fever?" If Yes, ask: "What is your temperature, how was it measured, and when did it start?"      Feeling feverish, has not measured temp  7. RESPIRATORY STATUS: "Describe your breathing?" (e.g., shortness of breath, wheezing, unable to speak)       Denies any difficulty breathing  8. BETTER-SAME-WORSE: "Are you getting better, staying the same or getting worse compared to yesterday?"  If getting worse, ask, "In what way?"      Same as last night  9. HIGH RISK DISEASE: "Do you have any chronic medical problems?" (e.g., asthma, heart or lung disease, weak immune system, obesity, etc.)      Denies chronic medical problems  10. VACCINE: "Have you had the COVID-19 vaccine?" If Yes, ask: "Which one, how many shots, when did you get it?"        Yes  11. BOOSTER: "Have you received your COVID-19 booster?" If Yes, ask: "Which one and when did you get it?"        Unspecfified  12. PREGNANCY: "Is there any chance you are pregnant?" "When was your last menstrual period?"        NA  13.  OTHER SYMPTOMS: "Do you have any other symptoms?"  (e.g., chills, fatigue, headache, loss of smell or taste, muscle pain, sore throat)        Head congestion, feverish, mild cough, sore throat, body aches. Protocols used: CORONAVIRUS (COVID-19) DIAGNOSED OR SUSPECTED-ADULT-    Pt requesting Paxlovid. Paged on-call. Per on-call, order  Paxlovid (300/100) 3 tablets by mouth BID x 5 days. Rx sent to pt preferred pharmacy. Pt made aware.

## 2023-09-15 NOTE — TELEPHONE ENCOUNTER
Regarding: Covid Positive  ----- Message from Marija Montes sent at 9/15/2023  7:20 PM EDT -----  Pt called, : I tested positive for covid and I would like to know if I can get a script for paxlovid."

## 2023-09-16 NOTE — TELEPHONE ENCOUNTER
Reason for Disposition  • [1] [id number] diagnosed by positive lab test (e.g., PCR, rapid self-test kit) AND [2] mild symptoms (e.g., cough, fever, others) AND [8] no complications or SOB    Protocols used: CORONAVIRUS (COVID-19) DIAGNOSED OR SUSPECTED-ADULT-

## 2023-12-08 ENCOUNTER — OFFICE VISIT (OUTPATIENT)
Dept: SLEEP CENTER | Facility: CLINIC | Age: 63
End: 2023-12-08
Payer: COMMERCIAL

## 2023-12-08 VITALS
SYSTOLIC BLOOD PRESSURE: 110 MMHG | DIASTOLIC BLOOD PRESSURE: 78 MMHG | HEART RATE: 82 BPM | RESPIRATION RATE: 20 BRPM | WEIGHT: 205 LBS | HEIGHT: 70 IN | OXYGEN SATURATION: 98 % | BODY MASS INDEX: 29.35 KG/M2

## 2023-12-08 DIAGNOSIS — G47.19 EXCESSIVE DAYTIME SLEEPINESS: ICD-10-CM

## 2023-12-08 DIAGNOSIS — G47.33 OSA (OBSTRUCTIVE SLEEP APNEA): Primary | ICD-10-CM

## 2023-12-08 DIAGNOSIS — G47.61 PLMD (PERIODIC LIMB MOVEMENT DISORDER): ICD-10-CM

## 2023-12-08 DIAGNOSIS — E66.3 OVERWEIGHT (BMI 25.0-29.9): ICD-10-CM

## 2023-12-08 DIAGNOSIS — J34.2 DEVIATED NASAL SEPTUM: ICD-10-CM

## 2023-12-08 DIAGNOSIS — G47.09 OTHER INSOMNIA: ICD-10-CM

## 2023-12-08 PROCEDURE — 99214 OFFICE O/P EST MOD 30 MIN: CPT | Performed by: INTERNAL MEDICINE

## 2023-12-08 NOTE — PATIENT INSTRUCTIONS

## 2023-12-08 NOTE — PROGRESS NOTES
Follow-Up Note - [facility]   Loni Santos  59 y.o. male  GS  FKQ:[id number]  XLZ:    CC: I saw this patient for follow-up in clinic today for Sleep disordered breathing, Coexisting Sleep and Medical Problems. Interval changes: He is using a Latesha machine    The study demonstrated   obstructive sleep apnea: AHI 6.7 /hour , higher while supine. Moderate intensity  snoring  was noted. Minimum oxygen saturation 88 %  and 0.1 minute of total sleep time was spent with saturations less than 90%. There were periodic limb movements of sleep for an index of 26 per hour but rarely associated with arousal       PFSH, Problem List, Medications & Allergies were reviewed in EMR. He  has no past medical history on file. He has a current medication list which includes the following prescription(s): ezetimibe, meloxicam, and rosuvastatin. PHYSIOLOGICAL DATA REVIEW : Using PAP > 4 hours/night 83%. Estimated HERMES 0.4/hour with pressure of 7.3cm Loren@[employer] percentile; patient has not been using non FDA approved devices to sanitize the machine. INTERPRETATION: Compliance is Very good; Pressure setting is:optimal; ;   SUBJECTIVE: With respect to use of PAP, Jose  is experiencing no adverse effects:. He derives benefit. Is satisfied with sleep and daytime function. Sleep Routine: Jose reports getting 7 hrs sleep on workdays and more on days off; he has no difficulty initiating, but reports diffculty maintaining sleep . He has nocturia 1-2 times a night and early morning awakening at around 4 AM but is able to fall back asleep. He arises needing an alarm and is not always refreshed. Jose reports significantly improved daytime sleepiness, but is tired by late afternoon. Lidia Cummins He rated himself at Total score: 8 /24 on the [place] Sleepiness Scale. Other issues: He has radicular symptoms and suspects jerking limb movements that may be interrupting sleep. .     Habits: Reports that he has never smoked. He has never used smokeless tobacco.,  Reports current alcohol use.,  Reports no history of drug use., Caffeine use:limited; Exercise routine: regular. ROS: Significant for some intentional weight reduction. He is reporting no nasal symptoms but has "popping in my ears "that he has had "all my life " -he has hearing difficulties for which she has seen ENT. Darylene Pipe EXAM: /78   Pulse 82   Resp 20   Ht 5' 10" (1.778 m)   Wt 93 kg (205 lb)   SpO2 98%   BMI 29.41 kg/m²     Wt Readings from Last 3 Encounters:   12/08/23 93 kg (205 lb)   03/10/23 98 kg (216 lb)   12/02/22 97.1 kg (214 lb)      Patient is well groomed; well appearing. CNS: Alert, orientated, speech clear & coherent  Psych: cooperative and in no distress. Mental state: Appears normal.  H&N: EOMI; NC/AT: No facial pressure marks, no rashes. Skin/Extrem: col & hydration normal; no edema  Resp: Respiratory effort is normal  Physical findings otherwise essentially unchanged from previous. IMPRESSION: Problem List Items & Comorbidities Addressed this Visit    1. KEVIN (obstructive sleep apnea)  PAP DME Resupply/Reorder    PAP DME Pressure Change      2. Other insomnia        3. PLMD (periodic limb movement disorder)        4. Excessive daytime sleepiness        5. Deviated nasal septum        6. Overweight (BMI 25.0-29. 9)            PLAN:  I reviewed results of prior studies and physiologic data with the patient. I discussed treatment options with risks and benefits. Treatment with  PAP is medically necessary and Jose is agreable to continue use. Care of equipment, methods to improve comfort using PAP and importance of compliance with therapy were discussed. Pressure setting: change 5-9 cmH2O. Rx provided to replace supplies and Care coordinated with DME provider. I suggested trial of gabapentin for PLM's that may be disturbing sleep (and may also improve sleep continuity and radicular symptoms) but he declined for now.   Multi component Cognitive behavioral therapy for Insomnia undertaken - Sleep Restriction, Stimulus control, Relaxation techniques and Sleep hygiene were discussed. Specifically limiting fluids close to bedtime. Discussed strategies for weight reduction. Follow-up is advised in 1 year or sooner if needed to monitor progress, compliance and to adjust therapy. Thank you for allowing me to participate in the care of this patient. Sincerely,     Authenticated electronically on 72/56/54   Board Certified Specialist     Portions of the record may have been created with voice recognition software. Occasional wrong word or "sound a like" substitutions may have occurred due to the inherent limitations of voice recognition software. There may also be notations and random deletions of words or characters from malfunctioning software. Read the chart carefully and recognize, using context, where substitutions/deletions have occurred.

## 2023-12-12 ENCOUNTER — TELEPHONE (OUTPATIENT)
Dept: SLEEP CENTER | Facility: CLINIC | Age: 63
End: 2023-12-12

## 2023-12-12 NOTE — TELEPHONE ENCOUNTER
RX for pressure change and PAP resupply sent to [address] [place] [address] via [address] [place] [address].

## 2023-12-13 LAB

## 2023-12-16 ENCOUNTER — APPOINTMENT (OUTPATIENT)
Dept: LAB | Facility: CLINIC | Age: 63
End: 2023-12-16
Payer: COMMERCIAL

## 2023-12-16 DIAGNOSIS — E78.01 FAMILIAL HYPERCHOLESTEROLEMIA: ICD-10-CM

## 2023-12-16 DIAGNOSIS — Z12.5 PROSTATE CANCER SCREENING: ICD-10-CM

## 2023-12-16 DIAGNOSIS — Z00.00 HEALTHCARE MAINTENANCE: ICD-10-CM

## 2023-12-16 LAB
ALBUMIN SERPL BCP-MCNC: 4.4 G/DL (ref 3.5–5)
ALP SERPL-CCNC: 52 U/L (ref 34–104)
ALT SERPL W P-5'-P-CCNC: 25 U/L (ref 7–52)
ANION GAP SERPL CALCULATED.3IONS-SCNC: 7 MMOL/L
AST SERPL W P-5'-P-CCNC: 17 U/L (ref 13–39)
BASOPHILS # BLD AUTO: 0.05 THOUSANDS/ÂΜL (ref 0–0.1)
BASOPHILS NFR BLD AUTO: 1 % (ref 0–1)
BILIRUB SERPL-MCNC: 0.57 MG/DL (ref 0.2–1)
BILIRUB UR QL STRIP: NEGATIVE
BUN SERPL-MCNC: 22 MG/DL (ref 5–25)
CALCIUM SERPL-MCNC: 9.5 MG/DL (ref 8.4–10.2)
CHLORIDE SERPL-SCNC: 105 MMOL/L (ref 96–108)
CHOLEST SERPL-MCNC: 173 MG/DL
CLARITY UR: CLEAR
CO2 SERPL-SCNC: 28 MMOL/L (ref 21–32)
COLOR UR: NORMAL
CREAT SERPL-MCNC: 1.03 MG/DL (ref 0.6–1.3)
EOSINOPHIL # BLD AUTO: 0.17 THOUSAND/ÂΜL (ref 0–0.61)
EOSINOPHIL NFR BLD AUTO: 2 % (ref 0–6)
ERYTHROCYTE [DISTWIDTH] IN BLOOD BY AUTOMATED COUNT: 13.3 % (ref 11.6–15.1)
GFR SERPL CREATININE-BSD FRML MDRD: 77 ML/MIN/1.73SQ M
GLUCOSE P FAST SERPL-MCNC: 94 MG/DL (ref 65–99)
GLUCOSE UR STRIP-MCNC: NEGATIVE MG/DL
HCT VFR BLD AUTO: 48 % (ref 36.5–49.3)
HDLC SERPL-MCNC: 46 MG/DL
HGB BLD-MCNC: 16 G/DL (ref 12–17)
HGB UR QL STRIP.AUTO: NEGATIVE
IMM GRANULOCYTES # BLD AUTO: 0.03 THOUSAND/UL (ref 0–0.2)
IMM GRANULOCYTES NFR BLD AUTO: 0 % (ref 0–2)
KETONES UR STRIP-MCNC: NEGATIVE MG/DL
LDLC SERPL CALC-MCNC: 102 MG/DL (ref 0–100)
LEUKOCYTE ESTERASE UR QL STRIP: NEGATIVE
LYMPHOCYTES # BLD AUTO: 1.56 THOUSANDS/ÂΜL (ref 0.6–4.47)
LYMPHOCYTES NFR BLD AUTO: 21 % (ref 14–44)
MCH RBC QN AUTO: 29.7 PG (ref 26.8–34.3)
MCHC RBC AUTO-ENTMCNC: 33.3 G/DL (ref 31.4–37.4)
MCV RBC AUTO: 89 FL (ref 82–98)
MONOCYTES # BLD AUTO: 0.51 THOUSAND/ÂΜL (ref 0.17–1.22)
MONOCYTES NFR BLD AUTO: 7 % (ref 4–12)
NEUTROPHILS # BLD AUTO: 5.05 THOUSANDS/ÂΜL (ref 1.85–7.62)
NEUTS SEG NFR BLD AUTO: 69 % (ref 43–75)
NITRITE UR QL STRIP: NEGATIVE
NONHDLC SERPL-MCNC: 127 MG/DL
NRBC BLD AUTO-RTO: 0 /100 WBCS
PH UR STRIP.AUTO: 6 [PH]
PLATELET # BLD AUTO: 277 THOUSANDS/UL (ref 149–390)
PMV BLD AUTO: 9.5 FL (ref 8.9–12.7)
POTASSIUM SERPL-SCNC: 4.3 MMOL/L (ref 3.5–5.3)
PROT SERPL-MCNC: 7.1 G/DL (ref 6.4–8.4)
PROT UR STRIP-MCNC: NEGATIVE MG/DL
PSA SERPL-MCNC: 0.86 NG/ML (ref 0–4)
RBC # BLD AUTO: 5.38 MILLION/UL (ref 3.88–5.62)
SODIUM SERPL-SCNC: 140 MMOL/L (ref 135–147)
SP GR UR STRIP.AUTO: 1.02 (ref 1–1.03)
TRIGL SERPL-MCNC: 124 MG/DL
UROBILINOGEN UR STRIP-ACNC: <2 MG/DL
WBC # BLD AUTO: 7.37 THOUSAND/UL (ref 4.31–10.16)

## 2023-12-16 PROCEDURE — 81003 URINALYSIS AUTO W/O SCOPE: CPT

## 2023-12-16 PROCEDURE — G0103 PSA SCREENING: HCPCS

## 2023-12-16 PROCEDURE — 80061 LIPID PANEL: CPT

## 2023-12-16 PROCEDURE — 36415 COLL VENOUS BLD VENIPUNCTURE: CPT

## 2023-12-16 PROCEDURE — 85025 COMPLETE CBC W/AUTO DIFF WBC: CPT

## 2023-12-16 PROCEDURE — 80053 COMPREHEN METABOLIC PANEL: CPT

## 2023-12-27 ENCOUNTER — TELEPHONE (OUTPATIENT)
Dept: SLEEP CENTER | Facility: CLINIC | Age: 63
End: 2023-12-27

## 2023-12-27 NOTE — TELEPHONE ENCOUNTER
[email or web address]  (C) [phone]  (F) [phone]  (F) [phone]    ,?    Re: Jose Lara   Setup Date:??  9/2/22  Ordering provider:Ajit barreto      I have been assigned a pressure change for this patient. Unfortunately, I am unable to complete the pressure change for the following reason:        ____Device has not been powered on.    ____Unable to reach patient after 3 attempts.     ____Patient Refuses Pressure Change.    _x___Unable to obtain PIN #    ___x_Other    Patient has been left 2 messaged to call in and give us pin #. ____

## 2023-12-29 ENCOUNTER — OFFICE VISIT (OUTPATIENT)
Dept: INTERNAL MEDICINE CLINIC | Facility: CLINIC | Age: 63
End: 2023-12-29
Payer: COMMERCIAL

## 2023-12-29 VITALS
WEIGHT: 209 LBS | HEART RATE: 87 BPM | BODY MASS INDEX: 29.92 KG/M2 | TEMPERATURE: 98 F | HEIGHT: 70 IN | SYSTOLIC BLOOD PRESSURE: 122 MMHG | OXYGEN SATURATION: 97 % | DIASTOLIC BLOOD PRESSURE: 80 MMHG

## 2023-12-29 DIAGNOSIS — E78.01 FAMILIAL HYPERCHOLESTEROLEMIA: Primary | ICD-10-CM

## 2023-12-29 DIAGNOSIS — M54.41 CHRONIC RIGHT-SIDED LOW BACK PAIN WITH RIGHT-SIDED SCIATICA: ICD-10-CM

## 2023-12-29 DIAGNOSIS — E66.3 OVERWEIGHT: ICD-10-CM

## 2023-12-29 DIAGNOSIS — H90.3 SENSORINEURAL HEARING LOSS (SNHL) OF BOTH EARS: ICD-10-CM

## 2023-12-29 DIAGNOSIS — G89.29 CHRONIC RIGHT-SIDED LOW BACK PAIN WITH RIGHT-SIDED SCIATICA: ICD-10-CM

## 2023-12-29 DIAGNOSIS — Z00.00 HEALTHCARE MAINTENANCE: ICD-10-CM

## 2023-12-29 PROCEDURE — 99396 PREV VISIT EST AGE 40-64: CPT | Performed by: INTERNAL MEDICINE

## 2023-12-29 NOTE — ASSESSMENT & PLAN NOTE
With the patient's BMI he is considered overweight.  States that he plans in the new year to work harder on exercise and diet program.  States that he was on a routine program with his wife walking on a daily basis and will restart this again in the near future depending on the weather.

## 2023-12-29 NOTE — ASSESSMENT & PLAN NOTE
States that he continues to do exercises at home to control his symptoms.  States that he is back to normal activity level without difficulty at this time.

## 2023-12-29 NOTE — ASSESSMENT & PLAN NOTE
Patient is a 63-year-old male history of multiple medical problems outlined previously who is here today for a yearly physical.  He did have extensive lab testing performed prior to the visit today we did discuss the results showing no acute abnormalities.  Patient did not complete the stool test but will have this done in the near future and may be due for routine colonoscopy.  Patient states he is doing well both physically and mentally with no new complaints or concerns.  Cholesterol is showing excellent control with present treatment and no exacerbation of his back pain.  Patient did undergo physical exam today in the office and no acute lesions.  Please see the notes on the chart.  Will continue present medication and surveillance return to the office in 6 months check a lipid profile at that time.  Discussed the importance of weight loss

## 2023-12-29 NOTE — ASSESSMENT & PLAN NOTE
Family history of hyperlipidemia.  Patient remains on medication including Zetia and Crestor.  Cholesterol is showing excellent control with present treatment.  Patient was reminded again the importance of watching his intake of fats and cholesterol with his diet.  Recheck a lipid profile in 6 months.

## 2023-12-29 NOTE — PROGRESS NOTES
Name: Jose Lara      : 1960      MRN: [id number]  Encounter Provider: Ramon Townsend DO  Encounter Date: 2023   Encounter department: [facility] INTERNAL MEDICINE    Assessment & Plan     1. Familial hypercholesterolemia  Assessment & Plan:  Family history of hyperlipidemia.  Patient remains on medication including Zetia and Crestor.  Cholesterol is showing excellent control with present treatment.  Patient was reminded again the importance of watching his intake of fats and cholesterol with his diet.  Recheck a lipid profile in 6 months.    Orders:  -     Lipid panel; Future    2. Healthcare maintenance  Assessment & Plan:  Patient is a 63-year-old male history of multiple medical problems outlined previously who is here today for a yearly physical.  He did have extensive lab testing performed prior to the visit today we did discuss the results showing no acute abnormalities.  Patient did not complete the stool test but will have this done in the near future and may be due for routine colonoscopy.  Patient states he is doing well both physically and mentally with no new complaints or concerns.  Cholesterol is showing excellent control with present treatment and no exacerbation of his back pain.  Patient did undergo physical exam today in the office and no acute lesions.  Please see the notes on the chart.  Will continue present medication and surveillance return to the office in 6 months check a lipid profile at that time.  Discussed the importance of weight loss      3. Sensorineural hearing loss (SNHL) of both ears  Assessment & Plan:  Bilateral hearing aids.  Patient states he is dependent on the use.      4. Overweight  Assessment & Plan:  With the patient's BMI he is considered overweight.  States that he plans in the new year to work harder on exercise and diet program.  States that he was on a routine program with his wife walking on a daily basis and will restart this again in  the near future depending on the weather.      5. Chronic right-sided low back pain with right-sided sciatica  Assessment & Plan:  States that he continues to do exercises at home to control his symptoms.  States that he is back to normal activity level without difficulty at this time.             Subjective     63-year-old male history of medical problems outlined previously who is here today for routine physical.  He did have extensive labs performed prior to visit today and we did discuss results at length with the patient.  Patient states in general feeling well and he has no new complaints or concerns.  Review of Systems   Constitutional: Negative.    HENT: Negative.          Bilateral hearing aids   Eyes: Negative.    Respiratory: Negative.     Cardiovascular: Negative.    Gastrointestinal: Negative.    Endocrine: Negative.    Genitourinary: Negative.    Musculoskeletal: Negative.    Skin: Negative.    Allergic/Immunologic: Negative.    Neurological: Negative.    Hematological: Negative.    Psychiatric/Behavioral: Negative.       History reviewed. No pertinent past medical history.  No past surgical history on file.  No family history on file.  Social History     Socioeconomic History   • Marital status: /Civil Union     Spouse name: None   • Number of children: None   • Years of education: None   • Highest education level: None   Occupational History   • None   Tobacco Use   • Smoking status: Never   • Smokeless tobacco: Never   Vaping Use   • Vaping status: Never Used   Substance and Sexual Activity   • Alcohol use: Yes   • Drug use: No   • Sexual activity: None   Other Topics Concern   • None   Social History Narrative   • None     Social Determinants of Health     Financial Resource Strain: Not on file   Food Insecurity: Not on file   Transportation Needs: Not on file   Physical Activity: Not on file   Stress: Not on file   Social Connections: Not on file   Intimate Partner Violence: Not on file  "  Housing Stability: Not on file     Current Outpatient Medications on File Prior to Visit   Medication Sig   • ezetimibe (ZETIA) 10 mg tablet Take 1 tablet (10 mg total) by mouth daily   • meloxicam (MOBIC) 15 mg tablet TAKE ONE TABLET BY MOUTH EVERY DAY   • rosuvastatin (CRESTOR) 10 MG tablet TAKE 1 TABLET DAILY     No Known Allergies  Immunization History   Administered Date(s) Administered   • COVID-19 PFIZER VACCINE 0.3 ML IM 04/14/2021, 05/05/2021, 01/24/2022   • INFLUENZA 12/09/2012, 09/02/2020, 09/16/2021   • Influenza, injectable, quadrivalent, preservative free 0.5 mL 09/04/2020   • Influenza, recombinant, quadrivalent,injectable, preservative free 10/13/2018   • Influenza, seasonal, injectable 10/12/2015, 10/07/2017   • Tdap 07/06/2017, 09/16/2021   • Zoster Vaccine Recombinant 09/02/2020, 09/04/2020, 11/09/2020       Objective     /80 (BP Location: Left arm, Patient Position: Sitting, Cuff Size: Standard)   Pulse 87   Temp 98 °F (36.7 °C)   Ht 5' 10\" (1.778 m)   Wt 94.8 kg (209 lb)   SpO2 97%   BMI 29.99 kg/m²     Physical Exam  Vitals and nursing note reviewed.   Constitutional:       General: He is not in acute distress.     Appearance: Normal appearance. He is obese. He is not ill-appearing, toxic-appearing or diaphoretic.      Comments: Pleasant, articulate, overweight 63-year-old male who is awake alert in no acute distress oriented x 3   HENT:      Head: Normocephalic and atraumatic.      Right Ear: Tympanic membrane, ear canal and external ear normal. There is no impacted cerumen.      Left Ear: Tympanic membrane, ear canal and external ear normal. There is no impacted cerumen.      Ears:      Comments: Bilateral hearing aids     Nose: Nose normal. No congestion or rhinorrhea.      Mouth/Throat:      Mouth: Mucous membranes are moist.      Pharynx: Oropharynx is clear. No oropharyngeal exudate or posterior oropharyngeal erythema.   Eyes:      General: No scleral icterus.        Right " eye: No discharge.         Left eye: No discharge.      Extraocular Movements: Extraocular movements intact.      Conjunctiva/sclera: Conjunctivae normal.      Pupils: Pupils are equal, round, and reactive to light.   Neck:      Vascular: No carotid bruit.   Cardiovascular:      Rate and Rhythm: Normal rate and regular rhythm.      Pulses: Normal pulses.      Heart sounds: Normal heart sounds. No murmur heard.     No friction rub. No gallop.   Pulmonary:      [place]: Pulmonary effort is normal. No respiratory distress.      Breath sounds: Normal breath sounds. No stridor. No wheezing, rhonchi or rales.   Chest:      Chest wall: No tenderness.   Abdominal:      General: Bowel sounds are normal. There is no distension.      Palpations: Abdomen is soft. There is no mass.      Tenderness: There is no abdominal tenderness. There is no right CVA tenderness, left CVA tenderness, guarding or rebound.      Hernia: No hernia is present.      Comments: Overweight   Genitourinary:     Penis: Normal.       Testes: Normal.      Prostate: Normal.      Rectum: Normal.      Comments: Moderately enlarged prostate with no tenderness to palpation no nodule masses.  Awaiting results of Hemoccult  Musculoskeletal:         General: Deformity (Deformity grossly on evaluation lumbar spine.  We did not undergo maneuvers because the severity disease) present. No swelling, tenderness or signs of injury.      Cervical back: Normal range of motion and neck supple. No rigidity or tenderness.      Right lower leg: No edema.      Left lower leg: No edema.      Comments: As previously some flattening to his lumbar curve.  Patient has full range of motion but no pain with movement.  No weakness to lower extremities   Lymphadenopathy:      Cervical: No cervical adenopathy.   Skin:     General: Skin is warm and dry.      Capillary Refill: Capillary refill takes less than 2 seconds.      Coloration: Skin is not jaundiced or pale.      Findings: No  bruising, erythema, lesion or rash.   Neurological:      General: No focal deficit present.      Mental Status: He is alert and oriented to person, place, and time. Mental status is at baseline.      Cranial Nerves: No cranial nerve deficit.      Sensory: No sensory deficit.      Motor: No weakness.      Coordination: Coordination normal.      Gait: Gait normal.      Deep Tendon Reflexes: Reflexes normal.   Psychiatric:         Mood and Affect: Mood normal.         Behavior: Behavior normal.         Thought Content: Thought content normal.         Judgment: Judgment normal.   Ramon Townsend,

## 2024-01-15 ENCOUNTER — APPOINTMENT (OUTPATIENT)
Dept: LAB | Facility: CLINIC | Age: 64
End: 2024-01-15
Payer: COMMERCIAL

## 2024-01-15 DIAGNOSIS — Z00.00 HEALTHCARE MAINTENANCE: ICD-10-CM

## 2024-01-15 LAB — HEMOCCULT STL QL IA: NEGATIVE

## 2024-01-15 PROCEDURE — G0328 FECAL BLOOD SCRN IMMUNOASSAY: HCPCS

## 2024-02-21 PROBLEM — Z00.00 HEALTHCARE MAINTENANCE: Status: RESOLVED | Noted: 2018-06-26 | Resolved: 2024-02-21

## 2024-03-21 DIAGNOSIS — E78.01 FAMILIAL HYPERCHOLESTEROLEMIA: ICD-10-CM

## 2024-03-22 RX ORDER — EZETIMIBE 10 MG/1
10 TABLET ORAL DAILY
Qty: 90 TABLET | Refills: 3 | Status: SHIPPED | OUTPATIENT
Start: 2024-03-22

## 2024-06-10 ENCOUNTER — TELEPHONE (OUTPATIENT)
Age: 64
End: 2024-06-10

## 2024-06-10 DIAGNOSIS — U07.1 COVID: Primary | ICD-10-CM

## 2024-06-10 RX ORDER — NIRMATRELVIR AND RITONAVIR 300-100 MG
3 KIT ORAL 2 TIMES DAILY
Qty: 30 TABLET | Refills: 0 | Status: SHIPPED | OUTPATIENT
Start: 2024-06-10 | End: 2024-06-15

## 2024-06-10 NOTE — TELEPHONE ENCOUNTER
Patient symptoms started around 06/06. Fever started over weekend . Test for Covid on Sat 06/08 and tested positive . Would like to know if he can have a script sent over for Paxlovid to [facility] in [place] .

## 2024-06-10 NOTE — TELEPHONE ENCOUNTER
Unsure why this was sent specifically to me without notification that the patient had further instructions as how to treat a COVID virus infection.  Will contact the patient.  Unable to leave message for anybody involved in the patient's care with a message that was sent through the pod.

## 2024-06-10 NOTE — ASSESSMENT & PLAN NOTE
Patient is suffering from upper respiratory tract infection was positive for COVID virus.  Asking for prescription for Paxlovid which was sent to the pharmacy.  We do have concerns over the patient's health and welfare but also of his family with his wife having a history of severe asthma.  We reinforced with the patient isolation.  Importance of family taking medication including vitamin D, vitamin C and zinc tablets.  Patient urged to call if not improving.

## 2024-06-10 NOTE — TELEPHONE ENCOUNTER
Patient called to check on Paxlovid request.  Notified PCP still has to review request.  Please notify patient when called into pharmacy.

## 2024-07-23 ENCOUNTER — APPOINTMENT (OUTPATIENT)
Dept: LAB | Facility: AMBULARY SURGERY CENTER | Age: 64
End: 2024-07-23
Payer: COMMERCIAL

## 2024-07-23 DIAGNOSIS — E78.01 FAMILIAL HYPERCHOLESTEROLEMIA: ICD-10-CM

## 2024-07-23 LAB
CHOLEST SERPL-MCNC: 179 MG/DL
HDLC SERPL-MCNC: 51 MG/DL
LDLC SERPL CALC-MCNC: 94 MG/DL (ref 0–100)
NONHDLC SERPL-MCNC: 128 MG/DL
TRIGL SERPL-MCNC: 168 MG/DL

## 2024-07-23 PROCEDURE — 80061 LIPID PANEL: CPT

## 2024-07-23 PROCEDURE — 36415 COLL VENOUS BLD VENIPUNCTURE: CPT

## 2024-07-29 ENCOUNTER — OFFICE VISIT (OUTPATIENT)
Dept: INTERNAL MEDICINE CLINIC | Facility: CLINIC | Age: 64
End: 2024-07-29
Payer: COMMERCIAL

## 2024-07-29 VITALS
DIASTOLIC BLOOD PRESSURE: 80 MMHG | SYSTOLIC BLOOD PRESSURE: 122 MMHG | TEMPERATURE: 98.7 F | OXYGEN SATURATION: 97 % | RESPIRATION RATE: 18 BRPM | WEIGHT: 211 LBS | HEIGHT: 70 IN | HEART RATE: 86 BPM | BODY MASS INDEX: 30.21 KG/M2

## 2024-07-29 DIAGNOSIS — H91.93 DECREASED HEARING OF BOTH EARS: ICD-10-CM

## 2024-07-29 DIAGNOSIS — Z12.5 PROSTATE CANCER SCREENING: ICD-10-CM

## 2024-07-29 DIAGNOSIS — Z00.00 HEALTHCARE MAINTENANCE: ICD-10-CM

## 2024-07-29 DIAGNOSIS — E78.01 FAMILIAL HYPERCHOLESTEROLEMIA: ICD-10-CM

## 2024-07-29 DIAGNOSIS — Z12.12 SCREENING FOR COLORECTAL CANCER: ICD-10-CM

## 2024-07-29 DIAGNOSIS — M79.602 LEFT ARM PAIN: Primary | ICD-10-CM

## 2024-07-29 DIAGNOSIS — G47.33 OSA (OBSTRUCTIVE SLEEP APNEA): ICD-10-CM

## 2024-07-29 DIAGNOSIS — Z12.11 SCREENING FOR COLORECTAL CANCER: ICD-10-CM

## 2024-07-29 DIAGNOSIS — E66.3 OVERWEIGHT: ICD-10-CM

## 2024-07-29 PROCEDURE — 3725F SCREEN DEPRESSION PERFORMED: CPT | Performed by: INTERNAL MEDICINE

## 2024-07-29 PROCEDURE — 99214 OFFICE O/P EST MOD 30 MIN: CPT | Performed by: INTERNAL MEDICINE

## 2024-07-29 NOTE — ASSESSMENT & PLAN NOTE
Patient is taking medication as prescribed the Crestor and Zetia.  His cholesterol has been under excellent control with present medication.  He was told even though is on the medication he needs to continue to watch his intake of fats and cholesterol with his diet.  Check a lipid profile with his next visit.

## 2024-07-29 NOTE — ASSESSMENT & PLAN NOTE
Patient states that he has been having problems with left shoulder and left arm pain and weakness.  He has been doing some exercises at home but has not been seen by orthopedics for physical therapy.  Patient on evaluation does have some weakness in the left shoulder girdle against resistance and he may have partial rotator cuff tear    No significant weakness otherwise with motor tone or strength although he feels his biceps is weaker than the right    We planned to have the patient seen in evaluation by orthopedics and a consult has been placed

## 2024-07-29 NOTE — ASSESSMENT & PLAN NOTE
With his BMI patient is overweight at this point in time is borderline obese.  As previously we discussed the importance of diet watching his caloric intake in order to help with weight loss.  Staying physically active

## 2024-07-29 NOTE — ASSESSMENT & PLAN NOTE
Patient will be due for routine physical when he returns to the office and 4 months.  He was given a slip for complete labs to be performed prior to his visit.  Patient was told in the interim if any new medical problems or concerns to please call.

## 2024-07-29 NOTE — PROGRESS NOTES
Ambulatory Visit  Name: Jose Lara      : 1960      MRN: [id number]  Encounter Provider: Ramon Townsend DO  Encounter Date: 2024   Encounter department: [facility] INTERNAL MEDICINE    Assessment & Plan   1. Left arm pain  Assessment & Plan:  Patient states that he has been having problems with left shoulder and left arm pain and weakness.  He has been doing some exercises at home but has not been seen by orthopedics for physical therapy.  Patient on evaluation does have some weakness in the left shoulder girdle against resistance and he may have partial rotator cuff tear    No significant weakness otherwise with motor tone or strength although he feels his biceps is weaker than the right    We planned to have the patient seen in evaluation by orthopedics and a consult has been placed  Orders:  -     Ambulatory Referral to Orthopedic Surgery; Future  2. Screening for colorectal cancer  -     Ambulatory Referral to Colorectal Surgery; Future  3. Familial hypercholesterolemia  Assessment & Plan:  Patient is taking medication as prescribed the Crestor and Zetia.  His cholesterol has been under excellent control with present medication.  He was told even though is on the medication he needs to continue to watch his intake of fats and cholesterol with his diet.  Check a lipid profile with his next visit.  4. Healthcare maintenance  Assessment & Plan:  Patient will be due for routine physical when he returns to the office and 4 months.  He was given a slip for complete labs to be performed prior to his visit.  Patient was told in the interim if any new medical problems or concerns to please call.  Orders:  -     Comprehensive metabolic panel; Future; Expected date: 10/29/2024  -     CBC and differential; Future; Expected date: 10/29/2024  -     Lipid panel; Future; Expected date: 10/29/2024  -     Urinalysis with microscopic; Future  -     PSA, Total Screen; Future  5. KEVIN (obstructive sleep  apnea)  Assessment & Plan:  Compliant with CPAP  6. Prostate cancer screening  -     PSA, Total Screen; Future  7. Decreased hearing of both ears  Assessment & Plan:  Bilateral hearing aids which she states has been helpful  8. Overweight  Assessment & Plan:  With his BMI patient is overweight at this point in time is borderline obese.  As previously we discussed the importance of diet watching his caloric intake in order to help with weight loss.  Staying physically active         History of Present Illness     Patient is a 63-year-old male history of multiple medical problems outlined previously who is here today for routine follow-up he did have labs performed this visit today specifically a lipid profile and we did discuss the results.  Patient states he is doing relatively well although he has some left shoulder discomfort and has been doing some in-home exercises but they do not seem to help.  Vitals are stable      Review of Systems   Constitutional: Negative.    HENT:          Bilateral hearing aids with no change in his hearing   Eyes: Negative.    Respiratory: Negative.     Cardiovascular: Negative.    Gastrointestinal: Negative.    Endocrine: Negative.    Genitourinary: Negative.    Musculoskeletal: Negative.    Skin: Negative.    Allergic/Immunologic: Negative.    Neurological: Negative.    Hematological: Negative.    Psychiatric/Behavioral: Negative.       No past medical history on file.  No past surgical history on file.  No family history on file.  Social History     Tobacco Use    Smoking status: Never    Smokeless tobacco: Never   Vaping Use    Vaping status: Never Used   Substance and Sexual Activity    Alcohol use: Yes    Drug use: No    Sexual activity: Not on file     Current Outpatient Medications on File Prior to Visit   Medication Sig    ezetimibe (ZETIA) 10 mg tablet TAKE 1 TABLET DAILY    meloxicam (MOBIC) 15 mg tablet TAKE ONE TABLET BY MOUTH EVERY DAY    rosuvastatin (CRESTOR) 10 MG  "tablet TAKE 1 TABLET DAILY     No Known Allergies  Immunization History   Administered Date(s) Administered    COVID-19 PFIZER VACCINE 0.3 ML IM 04/14/2021, 05/05/2021, 01/24/2022    COVID-19 Pfizer Vac BIVALENT Juan-sucrose 12 Yr+ IM 10/07/2022    COVID-19 Pfizer mRNA vacc PF juan-sucrose 12 yr and older (Comirnaty) 10/20/2023    INFLUENZA 12/09/2012, 09/02/2020, 09/16/2021    Influenza, injectable, quadrivalent, preservative free 0.5 mL 09/04/2020    Influenza, recombinant, quadrivalent,injectable, preservative free 10/13/2018    Influenza, seasonal, injectable 10/12/2015, 10/07/2017    Tdap 07/06/2017, 09/16/2021    Zoster Vaccine Recombinant 09/02/2020, 09/04/2020, 11/09/2020     Objective     /80   Pulse 86   Temp 98.7 °F (37.1 °C)   Resp 18   Ht 5' 10\" (1.778 m)   Wt 95.7 kg (211 lb)   SpO2 97%   BMI 30.28 kg/m²     Physical Exam  Vitals and nursing note reviewed.   Constitutional:       General: He is not in acute distress.     Appearance: Normal appearance. He is obese. He is not ill-appearing, toxic-appearing or diaphoretic.      Comments: Pleasant, obese 63-year-old male who is awake alert in no acute distress and oriented x 3   HENT:      Head: Normocephalic and atraumatic.      Right Ear: Tympanic membrane, ear canal and external ear normal. There is no impacted cerumen.      Left Ear: Tympanic membrane, ear canal and external ear normal. There is no impacted cerumen.      Nose: Nose normal. No congestion or rhinorrhea.      Mouth/Throat:      Mouth: Mucous membranes are moist.      Pharynx: Oropharynx is clear. No oropharyngeal exudate or posterior oropharyngeal erythema.   Eyes:      General: No scleral icterus.        Right eye: No discharge.         Left eye: No discharge.      Extraocular Movements: Extraocular movements intact.      Conjunctiva/sclera: Conjunctivae normal.      Pupils: Pupils are equal, round, and reactive to light.   Neck:      Vascular: No carotid bruit. "   Cardiovascular:      Rate and Rhythm: Normal rate and regular rhythm.      Pulses: Normal pulses.      Heart sounds: Normal heart sounds. No murmur heard.     No friction rub. No gallop.   Pulmonary:      [place]: Pulmonary effort is normal. No respiratory distress.      Breath sounds: Normal breath sounds. No stridor. No wheezing, rhonchi or rales.   Chest:      Chest wall: No tenderness.   Abdominal:      General: Abdomen is flat. Bowel sounds are normal. There is no distension.      Palpations: Abdomen is soft. There is no mass.      Tenderness: There is no abdominal tenderness. There is no right CVA tenderness, left CVA tenderness, guarding or rebound.      Hernia: No hernia is present.   Musculoskeletal:         General: No swelling, tenderness, deformity or signs of injury.      Cervical back: Normal range of motion and neck supple. No rigidity or tenderness.      Right lower leg: No edema.      Left lower leg: No edema.      Comments: Adhesive capsulitis with decreased range of motion to his left shoulder girdle with some crepitus with movement.  Patient has some weakness against resistance when his self left arm is elevated horizontally possible inflammation to the rotator cuff.   Lymphadenopathy:      Cervical: No cervical adenopathy.   Skin:     General: Skin is warm and dry.      Capillary Refill: Capillary refill takes less than 2 seconds.      Coloration: Skin is not jaundiced or pale.      Findings: No bruising, erythema, lesion or rash.   Neurological:      General: No focal deficit present.      Mental Status: He is alert and oriented to person, place, and time. Mental status is at baseline.      Cranial Nerves: No cranial nerve deficit.      Sensory: No sensory deficit.      Motor: No weakness.      Coordination: Coordination normal.      Gait: Gait normal.      Deep Tendon Reflexes: Reflexes normal.   Psychiatric:         Mood and Affect: Mood normal.         Behavior: Behavior normal.          Thought Content: Thought content normal.         Judgment: Judgment normal.

## 2024-08-07 DIAGNOSIS — E78.01 FAMILIAL HYPERCHOLESTEROLEMIA: ICD-10-CM

## 2024-08-07 RX ORDER — ROSUVASTATIN CALCIUM 10 MG/1
10 TABLET, COATED ORAL DAILY
Qty: 100 TABLET | Refills: 1 | Status: SHIPPED | OUTPATIENT
Start: 2024-08-07

## 2024-08-15 ENCOUNTER — APPOINTMENT (OUTPATIENT)
Dept: RADIOLOGY | Facility: AMBULARY SURGERY CENTER | Age: 64
End: 2024-08-15
Attending: ORTHOPAEDIC SURGERY
Payer: COMMERCIAL

## 2024-08-15 ENCOUNTER — OFFICE VISIT (OUTPATIENT)
Dept: OBGYN CLINIC | Facility: CLINIC | Age: 64
End: 2024-08-15
Payer: COMMERCIAL

## 2024-08-15 VITALS
SYSTOLIC BLOOD PRESSURE: 125 MMHG | DIASTOLIC BLOOD PRESSURE: 80 MMHG | HEIGHT: 70 IN | WEIGHT: 211 LBS | BODY MASS INDEX: 30.21 KG/M2 | HEART RATE: 72 BPM

## 2024-08-15 DIAGNOSIS — M79.602 LEFT ARM PAIN: ICD-10-CM

## 2024-08-15 DIAGNOSIS — M75.102 NONTRAUMATIC TEAR OF LEFT ROTATOR CUFF, UNSPECIFIED TEAR EXTENT: Primary | ICD-10-CM

## 2024-08-15 DIAGNOSIS — M25.512 LEFT SHOULDER PAIN, UNSPECIFIED CHRONICITY: ICD-10-CM

## 2024-08-15 PROCEDURE — 73030 X-RAY EXAM OF SHOULDER: CPT

## 2024-08-15 PROCEDURE — 99204 OFFICE O/P NEW MOD 45 MIN: CPT | Performed by: ORTHOPAEDIC SURGERY

## 2024-08-15 NOTE — PROGRESS NOTES
Assessment:  1. Nontraumatic tear of left rotator cuff, unspecified tear extent  MRI shoulder left wo contrast    Ambulatory referral to Physical Therapy      2. Left shoulder pain, unspecified chronicity  XR shoulder 2+ vw left    MRI shoulder left wo contrast    Ambulatory referral to Physical Therapy      3. Left arm pain  Ambulatory Referral to Orthopedic Surgery        Patient Active Problem List   Diagnosis    Left groin mass    Healthcare maintenance    KEVIN (obstructive sleep apnea)    Snoring    Daytime sleepiness    Fatigue    Overweight    Postnasal drip    Mood disturbance    Chronic right-sided low back pain with sciatica    Familial hypercholesterolemia    Decreased hearing of both ears    Sensorineural hearing loss (SNHL) of both ears    COVID    Left arm pain           Plan:  63 year old male with suspected atraumatic rotator cuff tear  Diagnostics reviewed and physical exam performed.  Diagnosis, treatment options and associated risks were discussed with the patient including no treatment, nonsurgical treatment and potential for surgical intervention.  The patient was given the opportunity to ask questions regarding each.   Cortisone injection offered today to provide symptomatic relief, however patient declined as he does not wish to inhibit healing if he does have a tear  Order placed today for patient to initiate outpatient Physical Therapy for left shoulder range of motion and strengthening  Order placed today for MRI of left shoulder to evaluate for rotator cuff tear  May use ice or heat as needed for pain relief  May take NSAIDs/Tylenol as needed for pain control  Follow up after MRI            Subjective:     Patient ID:  Jose Lara 63 y.o. adult    HPI  Jose Lara is a 63 y.o. adult who presents today for initial evaluation of left shoulder pain that began a few months ago. He notes he was moving furniture, did not have a specific mechanism or pop sensation, but a few days later awoke  with pain that is aggravated by overhead activity, sleeping, lifting items. He describes intermittent, aching and sharp, 5/10 average and 7/10 at worst pain partially relieved with Tylenol. He denies previous injury, surgery, trauma. Denies mechanical symptoms or paraesthesias.         The following portions of the patient's history were reviewed and updated as appropriate: allergies, current medications, past family history, past social history, past surgical history and problem list.        Social History     Socioeconomic History    Marital status: /Civil Union     Spouse name: Not on file    Number of children: Not on file    Years of education: Not on file    Highest education level: Not on file   Occupational History    Not on file   Tobacco Use    Smoking status: Never    Smokeless tobacco: Never   Vaping Use    Vaping status: Never Used   Substance and Sexual Activity    Alcohol use: Yes    Drug use: No    Sexual activity: Not on file   Other Topics Concern    Not on file   Social History Narrative    Not on file     Social Determinants of Health     Financial Resource Strain: Not on file   Food Insecurity: Not on file   Transportation Needs: Not on file   Physical Activity: Not on file   Stress: Not on file   Social Connections: Not on file   Intimate Partner Violence: Not on file   Housing Stability: Not on file     History reviewed. No pertinent past medical history.  History reviewed. No pertinent surgical history.  No Known Allergies  Current Outpatient Medications on File Prior to Visit   Medication Sig Dispense Refill    ezetimibe (ZETIA) 10 mg tablet TAKE 1 TABLET DAILY 90 tablet 3    meloxicam (MOBIC) 15 mg tablet TAKE ONE TABLET BY MOUTH EVERY DAY 30 tablet 5    rosuvastatin (CRESTOR) 10 MG tablet TAKE 1 TABLET DAILY 100 tablet 1     No current facility-administered medications on file prior to visit.              Objective:    Review of Systems  Pertinent items are noted in HPI.  All other  "systems were reviewed and are negative.    Physical Exam  /80   Pulse 72   Ht 5' 10\" (1.778 m)   Wt 95.7 kg (211 lb)   BMI 30.28 kg/m²   Cons: Appears well.  No apparent distress.  Psych: Alert. Oriented x3.  Mood and affect normal.  Eyes: PERRLA, EOMI  Resp: Normal effort.  No audible wheezing or stridor.  CV: Palpable pulse.  No discernable arrhythmia.  No LE edema.  Lymph:  No palpable cervical, axillary, or inguinal lymphadenopathy.  Skin: Warm.  No palpable masses.  No visible lesions.  Neuro: Normal muscle tone.  Normal and symmetric DTR's.    Left Shoulder Exam     Range of Motion   The patient has normal left shoulder ROM.    Other   Erythema: absent  Scars: absent  Sensation: normal  Pulse: present     Comments:    4/5 ER at 0 5/5 IR  4/5 empty can  + Gallegos  4/5 [place]ers  Brisk capillary refill  Sensation intact to Ax/R/M/U nerve distributions              Procedures  Procedures  No Procedures performed today    Diagnostics, reviewed and taken today if performed as documented:  I have personally reviewed pertinent films in PACS and my interpretation is no acute osseous abnormalities. Mild superior migration of the humeral head in relation to the glenoid.        Scribe Attestation      I,:  Suzy Cueto am acting as a scribe while in the presence of the attending physician.:       I,:  Marcos Blackburn DO personally performed the services described in this documentation    as scribed in my presence.:             Portions of the record may have been created with voice recognition software.  Occasional wrong word or \"sound a like\" substitutions may have occurred due to the inherent limitations of voice recognition software.  Read the chart carefully and recognize, using context, where substitutions have occurred.  "

## 2024-08-16 ENCOUNTER — TELEPHONE (OUTPATIENT)
Age: 64
End: 2024-08-16

## 2024-08-16 ENCOUNTER — PREP FOR PROCEDURE (OUTPATIENT)
Age: 64
End: 2024-08-16

## 2024-08-16 DIAGNOSIS — Z86.010 HISTORY OF COLON POLYPS: Primary | ICD-10-CM

## 2024-08-16 NOTE — TELEPHONE ENCOUNTER
24  Screened by: Sharon Willoughby    Referring Provider Dr Pereira    Pre- Screenin' 10 211 BMI 30.28    Has patient been referred for a routine screening Colonoscopy? yes  Is the patient between 45-75 years old? yes      Previous Colonoscopy yes   If yes:    Date: 2020    Facility:     Reason:         Does the patient want to see a Gastroenterologist prior to their procedure OR are they having any GI symptoms? no    Has the patient been hospitalized or had abdominal surgery in the past 6 months? no    Does the patient use supplemental oxygen? no    Does the patient take Coumadin, Lovenox, Plavix, Elliquis, Xarelto, or other blood thinning medication? no    Has the patient had a stroke, cardiac event, or stent placed in the past year? no      If patient is between 45yrs - 49yrs, please advise patient that we will have to confirm benefits & coverage with their insurance company for a routine screening colonoscopy.

## 2024-08-16 NOTE — LETTER
Attached are your prep instructions for your upcoming procedure on 12/30/24. If you have any questions or concerns please contact us at [phone].    Thank you,     [facility]    COLONOSCOPY  MIRALAX/Dulcolax Bowel Preparation Instructions    The OR/GI Lab will contact you the evening prior to your procedure with your exact arrival time.    Our practice requires a 1 week notice for any cancellations or rescheduling. We kindly ask that you immediately notify us of any changes including any new medications that are prescribed. Thank you for your cooperation.     WEEK BEFORE YOUR PROCEDURE:  Stop taking Iron tablets.  5 days prior, AVOID vegetables and fruits with skins or seeds, nuts, corn, popcorn and whole grain breads.   Purchase: One (1) 238-gram container of Miralax (polyethylene glycol 3350), four (4) 5 mg Dulcolax (bisacodyl) tablets, and one (1) 64-ounce bottle of Gatorade (sports drink) - no red, orange, or purple. These may be purchased at any pharmacy without a prescription. Generic products are permissible.   Arrange responsible transportation for day of the procedure.     DAY BEFORE THE PROCEDURE:   CLEAR liquids only for entire day prior. Nothing red, orange or purple.    You MAY have:                                                               Soda  Water  Broth Gatorade  Jello  Popsicles Coffee/tea without milk/creamer     YOU MAY NOT HAVE:  Solid foods   Milk and milk products    Juice with pulp    BOWEL PREPARATION:  Includes: One (1) 238-gram container of Miralax (polyethylene glycol 3350), four (4) 5 mg Dulcolax (bisacodyl) tablets, and one (1) 64-ounce bottle of Gatorade (sports drink).  Preparation may be refrigerated.  Entire bowel prep should be completed.     Afternoon before the procedure (2:00 pm - 5:00 pm):    Take two (2) 5 mg Dulcolax laxative tablets.     Evening before the procedure (6:00 pm):  Mix entire container of  Miralax with one (1) 64-ounce bottle of Gatorade and shake until all medication is dissolved.   Begin drinking solution. Drink an eight (8) ounce cup every 10-15 minutes until you have consumed half (32 ounces) of the solution.  Refrigerate remaining solution.    Night before the procedure (8:00 pm):  Take two (2) 5 mg Dulcolax laxative tablets.     Beginning 5 hours before your procedure:  Drink the remaining amount of prepared solution (32 ounces).  Drink an eight (8) ounce cup every 10-15 minutes until you have consumed the remaining solution.     Bowel prep should be completed 4 hours prior to procedure time.    NOTHING TO EAT OR DRINK AFTER MIDNIGHT- EXCEPT FOR YOUR PREP    DAY OF THE PROCEDURE:  You may brush your teeth.  Leave all jewelry at home.  Please arrive for your procedure as indicated by the OR / GI Lab / Endoscopy Unit. The hospital will contact you the day before with your exact arrival time.   Make sure you have arranged ahead of time for a responsible adult (18 or older) to accompany and drive you home after the procedure.  Please discuss any transportation concerns with our staff prior to your procedure.    The effects of the anesthesia can persist for 24 hours.  After receiving the sedation, you must exercise caution before engaging in any activity that could harm yourself and others (such as driving a car).  Do not make any important decisions or do not drink any alcoholic beverages during this time period.  After your procedure, you may have anything you'd like to eat or drink.  You will probably want to start with something light.  Please include plenty of fluids.  Avoid items that cause gas such as sodas and salads.    SPECIAL INSTRUCTIONS:    For patients currently taking blood thinners and/or antiplatelet therapy our office will contact the prescribing provider.  Our office will contact you with any required changes to your medication regimen.     Blood thinner (i.e. - Coumadin, Pradaxa,  Lovenox, Xarelto, Eliquis)  ?  Continue (Do Not Stop)  ? Stop______________for_____________days prior to the procedure.    Antiplatelet (i.e. - Plavix, Aggrenox, Effient, Brilinta)  ?  Continue (Do Not Stop)  ? Stop______________for_____________days prior to the procedure.       Diabetes:   If you are Diabetic, please see separate Diabetic Instruction Sheet.          Prescribed medications:  Do not stop your aspirin, or any of your other medications (unless instructed otherwise).    Take the rest of your prescribed medications with small sips of water at least 2 hours prior to your procedure.      For any questions or concerns related to your bowel preparation or pre-procedure instructions, please contact our office at [phone].  Thank you for choosing [facility]!Medicine Instructions for Adults with Diabetes who Need a Bowel Prep       Follow these instructions when a BOWEL PREP is required for your procedure or surgery!    NOTE:   GLP-1 Agonists taken weekly: do not take in the 7 days before your procedure   SGLT-2 Inhibitors: do not take in the 4 days before your procedure     On the Day Before Surgery/Procedure  If you are having a procedure (e.g. Colonoscopy) or surgery that requires a bowel prep and you may have at least a clear liquid diet, follow the directions below based on the type of medicine you take for your diabetes.     Type of Medicine You Take Examples What to do   Pre-Mixed Insulin - Intermediate Acting Humalog® 75/25, Humulin® 70/30, Novolog® 70/30, Regular Insulin Take ½ your regular dose the evening before your procedure   Rapid/Fast Acting Insulin Humalog® U200, NovoLog®, Apidra®, Fiasp® Take ½ your regular dose the evening before your procedure.   Long-Acting Insulin Lantus®, Levemir®, Tresiba®, Toujeo®, Basaglar® Take your FULL regular dose the day before procedure   Oral Sulfonylurea Glipizide/Glimepiride/Glucotrol® Take ½ your regular dose the evening before your  procedure   Other Oral Diabetes Medicines Metformin®, Glucophage®, Glucophage XR®, Riomet®, Glumetza®), Actose®, Avandia®, Glyset®, Prandin® Take your regular dose with dinner in the evening before your procedure   GLP-1 Agonists AdlyxinÒ, ByettaÒ, BydureonÒ, OzempicÒ, SoliquaÒ, TanzeumÒ, TrulicityÒ, VictozaÒ, Saxenda®, Rybelsus® If taken daily, take as normal    If taken weekly, do not take this medicine for 7 days before your procedure including the day of the procedure (resume taking after the procedure)   SGLT-2 Inhibitors Jardiance®, Invokana®, Farxiga®,   Steglatro®, Brenzavvy®, Qtern®, Segluromet®, Glyxambi®, Synjardy®, Synjardy XR®, Invokamet®, Invokamet XR®, Trijary XR®, Xigduo XR®, Steglujan® Do not take for 4 days before your procedure including the day of the procedure (resume taking after the procedure)                More information continued on back                    Medicine Instructions for Adults with Diabetes who Need a Bowel Prep  Page 2      On the Day of Surgery/Procedure  Follow the directions below based on the type of medicine you take for your diabetes.     Type of Medicine You Take Examples What to do   Long-Acting Insulin Lantus®, Levemir®, Tresiba®, Toujeo®, Basaglar®, Semglee®   If you usually take your Long-Acting Insulin in the morning, take the full dose as scheduled.   GLP-1 Agonists AdlyxinÒ, ByettaÒ, BydureonÒ, OzempicÒ, SoliquaÒ, TanzeumÒ, TrulicityÒ, VictozaÒ, Saxenda®, Rybelsus® Do NOT take this medicine on the day of your procedure (resume taking after the procedure)       On the Day of Surgery/Procedure (continued)  Except for the morning Long-Acting Insulin, DO NOT take ANY diabetic medicine on the day of your procedure unless you were instructed by the doctor who manages your diabetes medicines.    Continue to check your blood sugars.  If you have an insulin pump, ask your endocrinologist for instructions at least 3 days before your procedure. NOTE: If you are not able to  ask your endocrinologist in advance, on the day of the procedure set your insulin pump to your basal rate only. Bring your insulin pump supplies to the hospital.     If you have any questions about taking your diabetes medicines prior to your procedure, please contact the doctor who manages your diabetes medicines.

## 2024-08-16 NOTE — TELEPHONE ENCOUNTER
Scheduled date of colonoscopy (as of today):12/30/24    Physician performing colonoscopy:Dr Pereira    Location of colonoscopy:[facility]    Bowel prep reviewed with patient:miralax/dulcolax    Instructions reviewed with patient by:prep instructions sent via [employer]    Clearances: N/A

## 2024-08-20 ENCOUNTER — EVALUATION (OUTPATIENT)
Dept: PHYSICAL THERAPY | Facility: CLINIC | Age: 64
End: 2024-08-20
Payer: COMMERCIAL

## 2024-08-20 DIAGNOSIS — M25.512 LEFT SHOULDER PAIN, UNSPECIFIED CHRONICITY: ICD-10-CM

## 2024-08-20 DIAGNOSIS — M75.102 NONTRAUMATIC TEAR OF LEFT ROTATOR CUFF, UNSPECIFIED TEAR EXTENT: Primary | ICD-10-CM

## 2024-08-20 PROCEDURE — 97112 NEUROMUSCULAR REEDUCATION: CPT | Performed by: PHYSICAL THERAPIST

## 2024-08-20 PROCEDURE — 97161 PT EVAL LOW COMPLEX 20 MIN: CPT | Performed by: PHYSICAL THERAPIST

## 2024-08-20 PROCEDURE — 97535 SELF CARE MNGMENT TRAINING: CPT | Performed by: PHYSICAL THERAPIST

## 2024-08-20 NOTE — PROGRESS NOTES
PT Evaluation     Today's date: 2024  Patient name: Jose Lara  : 1960  MRN: [id number]  Referring provider: Marcos Blackburn DO  Dx:   Encounter Diagnosis     ICD-10-CM    1. Nontraumatic tear of left rotator cuff, unspecified tear extent  M75.102 Ambulatory referral to Physical Therapy      2. Left shoulder pain, unspecified chronicity  M25.512 Ambulatory referral to Physical Therapy                     Assessment  Impairments: abnormal or restricted ROM, abnormal movement, activity intolerance, impaired physical strength, lacks appropriate home exercise program, pain with function and poor posture     Assessment details: Pt presents with s/s consistent with a L partial RTC tear.  Pt will benefit from PT to address impairments and restore function.    Goals  ST-4 weeks.  1.  Pt will decrease pain > 50%.  2.  Pt will demonstrated full, pain-free AROM.    LT-8 weeks.  1.  Pt will decrease pain > 75%.  2.  Pt will work without pain.    Plan    Planned therapy interventions: manual therapy, neuromuscular re-education, postural training, self care, strengthening, stretching, therapeutic exercise, therapeutic activities, therapeutic training, functional ROM exercises, flexibility, graded exercise, graded activity, graded motor and home exercise program    Frequency: 1-2x/wk.  Duration in weeks: 6        Subjective Evaluation    History of Present Illness  Mechanism of injury: Pt is a 63 yom c/o L shoulder pain that began with an insidious onset approximately 8 months ago.    Patient Goals  Patient goals for therapy: increased strength, independence with ADLs/IADLs, return to sport/leisure activities, decreased pain, decreased edema and increased motion    Pain  Location: upper lateral arm and above the elbow  Quality: dull ache  Relieving factors: rest and change in position  Aggravating factors: overhead activity and lifting (prolonged sitting, L SL.)  Progression: no change      Diagnostic  Tests  X-ray: normal (8/15/24)        Objective     Active Range of Motion   Left Shoulder   Flexion: 135 degrees with pain  Abduction: 132 degrees with pain  External rotation BTH: C6   Internal rotation BTB: T11     Right Shoulder   Flexion: 140 degrees   Abduction: 140 degrees   External rotation BTH: C7   Internal rotation BTB: T10     Passive Range of Motion   Left Shoulder   Flexion: 135 degrees with pain  Abduction: 125 degrees with pain  External rotation 90°: 90 degrees with pain  Internal rotation 90°: 50 degrees     Strength/Myotome Testing     Left Shoulder     Planes of Motion   Flexion: 3+   Abduction: 3+   External rotation at 0°: 3+   Internal rotation at 0°: 4     Additional Strength Details  Rep sh ext: 1x10: pt OP: 3x10: D, B: no change in strength.  Rep sh IR: 1x10, pt OP: 3x10: D, B, no change in strength.  Rep resisted ER: NE.    Tests     Left Shoulder   Positive drop arm and painful arc.   Negative external rotation lag sign, Hawkin's and Neer's.              Dx: L partial RTC tear.    Manuals 8/20            L shoulder PROM                                                    Neuro Re-Ed                                                                                                        Ther Ex             Ecc TB ER R/ 2x10, G/ 1x10 G/ 3x10           wallslides             Prone T             Standing rep sh IR 1x10                                                                Ther Activity                                       Gait Training                                       Modalities

## 2024-08-22 ENCOUNTER — OFFICE VISIT (OUTPATIENT)
Dept: PHYSICAL THERAPY | Facility: CLINIC | Age: 64
End: 2024-08-22
Payer: COMMERCIAL

## 2024-08-22 DIAGNOSIS — M25.512 LEFT SHOULDER PAIN, UNSPECIFIED CHRONICITY: ICD-10-CM

## 2024-08-22 DIAGNOSIS — M75.102 NONTRAUMATIC TEAR OF LEFT ROTATOR CUFF, UNSPECIFIED TEAR EXTENT: Primary | ICD-10-CM

## 2024-08-22 PROCEDURE — 97110 THERAPEUTIC EXERCISES: CPT | Performed by: PHYSICAL THERAPIST

## 2024-08-22 NOTE — PROGRESS NOTES
Daily Note     Today's date: 2024  Patient name: Jose Lara  : 1960  MRN: [id number]  Referring provider: Marcos Blackburn DO  Dx:   Encounter Diagnosis     ICD-10-CM    1. Nontraumatic tear of left rotator cuff, unspecified tear extent  M75.102       2. Left shoulder pain, unspecified chronicity  M25.512                      Subjective: Pt reports good compliance with HEP.    Objective: See treatment diary below    Assessment: Pt demonstrated min improvement in ROM and pain with rep ext > IR.    Plan: Continue per plan of care.      Dx: L partial RTC tear.    Manuals            L shoulder PROM  10 min                                                  Neuro Re-Ed                                                                                                        Ther Ex             Ecc TB ER R/ 2x10, G/ 1x10 G/ 3x10           wallslides  3x10           Prone T  3x10*           Standing rep sh ext pt OP  3x10           Standing rep sh IR pt OP 1x10 3x10                                                               Ther Activity                                       Gait Training                                       Modalities

## 2024-08-27 ENCOUNTER — OFFICE VISIT (OUTPATIENT)
Dept: PHYSICAL THERAPY | Facility: CLINIC | Age: 64
End: 2024-08-27
Payer: COMMERCIAL

## 2024-08-27 DIAGNOSIS — M75.102 NONTRAUMATIC TEAR OF LEFT ROTATOR CUFF, UNSPECIFIED TEAR EXTENT: Primary | ICD-10-CM

## 2024-08-27 DIAGNOSIS — M25.512 LEFT SHOULDER PAIN, UNSPECIFIED CHRONICITY: ICD-10-CM

## 2024-08-27 PROCEDURE — 97110 THERAPEUTIC EXERCISES: CPT

## 2024-08-27 PROCEDURE — 97140 MANUAL THERAPY 1/> REGIONS: CPT

## 2024-08-27 NOTE — PROGRESS NOTES
"Daily Note     Today's date: 2024  Patient name: Jose Lara  : 1960  MRN: [id number]  Referring provider: Marcos Blackburn DO  Dx:   Encounter Diagnosis     ICD-10-CM    1. Nontraumatic tear of left rotator cuff, unspecified tear extent  M75.102       2. Left shoulder pain, unspecified chronicity  M25.512           Start Time: 1730  Stop Time:   Total time in clinic (min): 45 minutes    Subjective: Patient reports helping to lift a TV on Saturday which flared up his shoulder pain \"6/10\". He notes it's a little bit better since then but still \"6/10\" when he lifts his arm.    Objective: See treatment diary below    Assessment: Patient had better response to rep L shoulder extension with OP noting a slight decrease in pain. Tactile cueing to better isolate infraspinatus during TB ER. Patient is going to complete rep L shoulder ext with OP and TB ER for home.     Plan: Continue per plan of care.      Dx: L partial RTC tear.    Manuals           L shoulder PROM  10 min 10 min                                                 Neuro Re-Ed                                                                                                        Ther Ex             Ecc TB ER R/ 2x10, G/ 1x10 G/ 3x10 G/  3x10          wallslides  3x10 3x10          Prone T  3x10* 3x10          Standing rep sh ext pt OP  3x10 3x10  2x10          Standing rep sh IR pt OP 1x10 3x10 3x10                                                              Ther Activity                                       Gait Training                                       Modalities                                          "

## 2024-08-28 PROBLEM — Z00.00 HEALTHCARE MAINTENANCE: Status: RESOLVED | Noted: 2018-06-26 | Resolved: 2024-08-28

## 2024-08-29 ENCOUNTER — OFFICE VISIT (OUTPATIENT)
Dept: PHYSICAL THERAPY | Facility: CLINIC | Age: 64
End: 2024-08-29
Payer: COMMERCIAL

## 2024-08-29 DIAGNOSIS — M25.512 LEFT SHOULDER PAIN, UNSPECIFIED CHRONICITY: ICD-10-CM

## 2024-08-29 DIAGNOSIS — M75.102 NONTRAUMATIC TEAR OF LEFT ROTATOR CUFF, UNSPECIFIED TEAR EXTENT: Primary | ICD-10-CM

## 2024-08-29 PROCEDURE — 97112 NEUROMUSCULAR REEDUCATION: CPT | Performed by: PHYSICAL THERAPIST

## 2024-08-29 PROCEDURE — 97140 MANUAL THERAPY 1/> REGIONS: CPT | Performed by: PHYSICAL THERAPIST

## 2024-08-29 NOTE — PROGRESS NOTES
Daily Note     Today's date: 2024  Patient name: Jose Lara  : 1960  MRN: [id number]  Referring provider: Marcos Blackburn DO  Dx:   Encounter Diagnosis     ICD-10-CM    1. Nontraumatic tear of left rotator cuff, unspecified tear extent  M75.102       2. Left shoulder pain, unspecified chronicity  M25.512                      Subjective: Patient reports 3/10 L sh pain with elevation.    Objective: See treatment diary below    Assessment: Patient demonstrated an improvement in pain with elevation.    Plan: Continue per plan of care.      Dx: L partial RTC tear.    Manuals          L shoulder PROM  10 min 10 min 10 min                                                Neuro Re-Ed                                                                                                        Ther Ex             Ecc TB ER R/ 2x10, G/ 1x10 G/ 3x10 G/  3x10 G/ 3x12         wallslides  3x10 3x10 3x15         Prone T  3x10* 3x10 Pball 3x10         Prone ER    Pball/ 3x10         Standing rep sh ext pt OP  3x10 3x10  2x10 3x10         Standing rep sh IR pt OP 1x10 3x10 3x10                                                              Ther Activity                                       Gait Training                                       Modalities

## 2024-08-30 ENCOUNTER — HOSPITAL ENCOUNTER (OUTPATIENT)
Dept: MRI IMAGING | Facility: HOSPITAL | Age: 64
Discharge: HOME | End: 2024-08-30
Attending: ORTHOPAEDIC SURGERY
Payer: COMMERCIAL

## 2024-08-30 DIAGNOSIS — M25.512 LEFT SHOULDER PAIN, UNSPECIFIED CHRONICITY: ICD-10-CM

## 2024-08-30 DIAGNOSIS — M75.102 NONTRAUMATIC TEAR OF LEFT ROTATOR CUFF, UNSPECIFIED TEAR EXTENT: ICD-10-CM

## 2024-08-30 PROCEDURE — 73221 MRI JOINT UPR EXTREM W/O DYE: CPT

## 2024-09-05 ENCOUNTER — OFFICE VISIT (OUTPATIENT)
Dept: PHYSICAL THERAPY | Facility: CLINIC | Age: 64
End: 2024-09-05
Payer: COMMERCIAL

## 2024-09-05 DIAGNOSIS — M25.512 LEFT SHOULDER PAIN, UNSPECIFIED CHRONICITY: ICD-10-CM

## 2024-09-05 DIAGNOSIS — M75.102 NONTRAUMATIC TEAR OF LEFT ROTATOR CUFF, UNSPECIFIED TEAR EXTENT: Primary | ICD-10-CM

## 2024-09-05 PROCEDURE — 97112 NEUROMUSCULAR REEDUCATION: CPT | Performed by: PHYSICAL MEDICINE & REHABILITATION

## 2024-09-05 PROCEDURE — 97110 THERAPEUTIC EXERCISES: CPT | Performed by: PHYSICAL MEDICINE & REHABILITATION

## 2024-09-05 NOTE — PROGRESS NOTES
Daily Note     Today's date: 2024  Patient name: Jose Lara  : 1960  MRN: [id number]  Referring provider: Marcos Blackburn DO  Dx:   Encounter Diagnosis     ICD-10-CM    1. Nontraumatic tear of left rotator cuff, unspecified tear extent  M75.102       2. Left shoulder pain, unspecified chronicity  M25.512                      Subjective: Patient notes some improvement in shoulder sxs.     Objective: See treatment diary below    Assessment: Patient tolerated treatment well overall. Fatigued end of session. Continues with painful arc during active and passive ROM. Continue as able nv per patient tolerance.     Plan: Continue per plan of care.      Dx: L partial RTC tear.    Manuals         L shoulder PROM  10 min 10 min 10 min LH                                               Neuro Re-Ed                                                                                                        Ther Ex             Ecc TB ER R/ 2x10, G/ 1x10 G/ 3x10 G/  3x10 G/ 3x12 GTB 3x12         wallslides  3x10 3x10 3x15 3x15        Prone T  3x10* 3x10 Pball 3x10 Pball 3x10        Prone ER    Pball/ 3x10 Pball 3x10        Standing rep sh ext pt OP  3x10 3x10  2x10 3x10 3x10        Standing rep sh IR pt OP 1x10 3x10 3x10                                                              Ther Activity                                       Gait Training                                       Modalities

## 2024-09-12 ENCOUNTER — OFFICE VISIT (OUTPATIENT)
Dept: PHYSICAL THERAPY | Facility: CLINIC | Age: 64
End: 2024-09-12
Payer: COMMERCIAL

## 2024-09-12 DIAGNOSIS — M75.102 NONTRAUMATIC TEAR OF LEFT ROTATOR CUFF, UNSPECIFIED TEAR EXTENT: Primary | ICD-10-CM

## 2024-09-12 DIAGNOSIS — M25.512 LEFT SHOULDER PAIN, UNSPECIFIED CHRONICITY: ICD-10-CM

## 2024-09-12 PROCEDURE — 97140 MANUAL THERAPY 1/> REGIONS: CPT

## 2024-09-12 PROCEDURE — 97110 THERAPEUTIC EXERCISES: CPT

## 2024-09-12 NOTE — PROGRESS NOTES
Daily Note     Today's date: 2024  Patient name: Jose Lara  : 1960  MRN: [id number]  Referring provider: Marcos Blackburn DO  Dx:   Encounter Diagnosis     ICD-10-CM    1. Nontraumatic tear of left rotator cuff, unspecified tear extent  M75.102       2. Left shoulder pain, unspecified chronicity  M25.512                      Subjective: Patient reports 3/10 left shoulder pain with overhead reaching.     Objective: See treatment diary below; L shoulder PROM: flexion: 140 degrees, abduction: 135 degrees, IR: 60 degrees, ER: 90 degrees    Assessment: Patient demonstrated increased tolerance to scapular stabilization. He continues to demonstrate mild painful arc with wall slides. PROM in all planes is slowly increasing.    Plan: Continue per plan of care.      Dx: L partial RTC tear.    Manuals        L shoulder PROM  10 min 10 min 10 min LH 10 min                                              Neuro Re-Ed                                                                                                        Ther Ex             Ecc TB ER R/ 2x10, G/ 1x10 G/ 3x10 G/  3x10 G/ 3x12 GTB 3x12  GTB  3x15       wallslides  3x10 3x10 3x15 3x15 3x15       Prone T  3x10* 3x10 Pball 3x10 Pball 3x10 Pball  3x12       Prone ER    Pball/ 3x10 Pball 3x10 Pball  3x10       Standing rep sh ext pt OP  3x10 3x10  2x10 3x10 3x10 3x10       Standing rep sh IR pt OP 1x10 3x10 3x10                                                              Ther Activity                                       Gait Training                                       Modalities

## 2024-09-19 ENCOUNTER — OFFICE VISIT (OUTPATIENT)
Dept: PHYSICAL THERAPY | Facility: CLINIC | Age: 64
End: 2024-09-19
Payer: COMMERCIAL

## 2024-09-19 DIAGNOSIS — M25.512 LEFT SHOULDER PAIN, UNSPECIFIED CHRONICITY: ICD-10-CM

## 2024-09-19 DIAGNOSIS — M75.102 NONTRAUMATIC TEAR OF LEFT ROTATOR CUFF, UNSPECIFIED TEAR EXTENT: Primary | ICD-10-CM

## 2024-09-19 PROCEDURE — 97112 NEUROMUSCULAR REEDUCATION: CPT | Performed by: PHYSICAL THERAPIST

## 2024-09-19 PROCEDURE — 97110 THERAPEUTIC EXERCISES: CPT | Performed by: PHYSICAL THERAPIST

## 2024-09-19 PROCEDURE — 97140 MANUAL THERAPY 1/> REGIONS: CPT | Performed by: PHYSICAL THERAPIST

## 2024-09-19 NOTE — PROGRESS NOTES
"Daily Note     Today's date: 2024  Patient name: Jose Lara  : 1960  MRN: [id number]  Referring provider: Marcos Blackburn DO  Dx:   Encounter Diagnosis     ICD-10-CM    1. Nontraumatic tear of left rotator cuff, unspecified tear extent  M75.102       2. Left shoulder pain, unspecified chronicity  M25.512                      Subjective: Patient reports no L sh pain at rest, 3/10 shoulder pain with ABD.     Objective: See treatment diary below.      Assessment: Patient demonstrated improved ABD AROM with less pain after TB ER.     Plan: Continue per plan of care.      Dx: L partial RTC tear.    Manuals       L shoulder IR PROM  10 min 10 min 10 min LH 10 min 15\"x10      Gr IV inf GH mobs       1x30                                Neuro Re-Ed                                                                                                        Ther Ex             Ecc TB ER R/ 2x10, G/ 1x10 G/ 3x10 G/  3x10 G/ 3x12 GTB 3x12  GTB  3x15 Efraín/ 3x10      wallslides  3x10 3x10 3x15 3x15 3x15 1#/ 3x10      Prone T  3x10* 3x10 Pball 3x10 Pball 3x10 Pball  3x12 Pball/ 3x12      Prone ER    Pball/ 3x10 Pball 3x10 Pball  3x10 Pball/ 3x12      Standing rep sh ext pt OP  3x10 3x10  2x10 3x10 3x10 3x10 3x10      Standing rep sh IR pt OP 1x10 3x10 3x10                                                              Ther Activity                                       Gait Training                                       Modalities                                          "

## 2024-09-23 ENCOUNTER — OFFICE VISIT (OUTPATIENT)
Dept: OBGYN CLINIC | Facility: CLINIC | Age: 64
End: 2024-09-23
Payer: COMMERCIAL

## 2024-09-23 VITALS
DIASTOLIC BLOOD PRESSURE: 88 MMHG | HEIGHT: 70 IN | SYSTOLIC BLOOD PRESSURE: 152 MMHG | WEIGHT: 213 LBS | HEART RATE: 65 BPM | BODY MASS INDEX: 30.49 KG/M2

## 2024-09-23 DIAGNOSIS — M75.102 NONTRAUMATIC TEAR OF LEFT ROTATOR CUFF, UNSPECIFIED TEAR EXTENT: Primary | ICD-10-CM

## 2024-09-23 PROCEDURE — 20610 DRAIN/INJ JOINT/BURSA W/O US: CPT | Performed by: ORTHOPAEDIC SURGERY

## 2024-09-23 PROCEDURE — 99213 OFFICE O/P EST LOW 20 MIN: CPT | Performed by: ORTHOPAEDIC SURGERY

## 2024-09-23 RX ORDER — BETAMETHASONE SODIUM PHOSPHATE AND BETAMETHASONE ACETATE 3; 3 MG/ML; MG/ML
9 INJECTION, SUSPENSION INTRA-ARTICULAR; INTRALESIONAL; INTRAMUSCULAR; SOFT TISSUE
Status: SHIPPED | OUTPATIENT
Start: 2024-09-23

## 2024-09-23 RX ORDER — LIDOCAINE HYDROCHLORIDE 10 MG/ML
0.5 INJECTION, SOLUTION INFILTRATION; PERINEURAL
Status: SHIPPED | OUTPATIENT
Start: 2024-09-23

## 2024-09-23 RX ORDER — LIDOCAINE HYDROCHLORIDE 10 MG/ML
1 INJECTION, SOLUTION INFILTRATION; PERINEURAL
Status: SHIPPED | OUTPATIENT
Start: 2024-09-23

## 2024-09-23 RX ADMIN — BETAMETHASONE SODIUM PHOSPHATE AND BETAMETHASONE ACETATE 9 MG: 3; 3 INJECTION, SUSPENSION INTRA-ARTICULAR; INTRALESIONAL; INTRAMUSCULAR; SOFT TISSUE at 16:45

## 2024-09-23 RX ADMIN — LIDOCAINE HYDROCHLORIDE 1 ML: 10 INJECTION, SOLUTION INFILTRATION; PERINEURAL at 16:45

## 2024-09-23 RX ADMIN — LIDOCAINE HYDROCHLORIDE 0.5 ML: 10 INJECTION, SOLUTION INFILTRATION; PERINEURAL at 16:45

## 2024-09-23 NOTE — PROGRESS NOTES
Assessment:  1. Nontraumatic tear of left rotator cuff, unspecified tear extent          Patient Active Problem List   Diagnosis    Left groin mass    KEVIN (obstructive sleep apnea)    Snoring    Daytime sleepiness    Fatigue    Overweight    Postnasal drip    Mood disturbance    Chronic right-sided low back pain with sciatica    Familial hypercholesterolemia    Decreased hearing of both ears    Sensorineural hearing loss (SNHL) of both ears    COVID    Left arm pain    Nontraumatic tear of left rotator cuff           Plan      Left shoulder small RTC tear with arthritic changes  Current symptoms of elft generalized shoulder pain with abduction and flexion past shoulder  Patient find some benefit from PT  Continue physical therapy  The patient was provided with left subacromial steroid injection.  The patient tolerated the procedure well.    The patient should follow up as needed.  Patient to call if he would like to pursue PRP injections.            Subjective:     Patient ID:    Chief Complaint:Jose Lara 63 y.o. adult      HPI    Patient presents for follow up of left shoulder with MRI review.  He feels the same.  Today he complains of on-going left generalized shoulder pain with raising arm.  He has continued physical therapy with slow progress.  He denies current use of medications.  He denies past physical therapy.          The following portions of the patient's history were reviewed and updated as appropriate: allergies, current medications, past family history, past social history, past surgical history and problem list.        Social History     Socioeconomic History    Marital status: /Civil Union     Spouse name: Not on file    Number of children: Not on file    Years of education: Not on file    Highest education level: Not on file   Occupational History    Not on file   Tobacco Use    Smoking status: Never    Smokeless tobacco: Never   Vaping Use    Vaping status: Never Used   Substance and  Sexual Activity    Alcohol use: Yes    Drug use: No    Sexual activity: Not on file   Other Topics Concern    Not on file   Social History Narrative    Not on file     Social Determinants of Health     Financial Resource Strain: Not on file   Food Insecurity: Not on file   Transportation Needs: Not on file   Physical Activity: Not on file   Stress: Not on file   Social Connections: Not on file   Intimate Partner Violence: Not on file   Housing Stability: Not on file     History reviewed. No pertinent past medical history.  History reviewed. No pertinent surgical history.  No Known Allergies  Current Outpatient Medications on File Prior to Visit   Medication Sig Dispense Refill    ezetimibe (ZETIA) 10 mg tablet TAKE 1 TABLET DAILY 90 tablet 3    meloxicam (MOBIC) 15 mg tablet TAKE ONE TABLET BY MOUTH EVERY DAY 30 tablet 5    rosuvastatin (CRESTOR) 10 MG tablet TAKE 1 TABLET DAILY 100 tablet 1     No current facility-administered medications on file prior to visit.              Objective:    Review of Systems   Constitutional:  Negative for chills, fever and unexpected weight change.   HENT:  Negative for hearing loss, nosebleeds and sore throat.    Eyes:  Negative for pain, redness and visual disturbance.   Respiratory:  Negative for cough, shortness of breath and wheezing.    Cardiovascular:  Negative for chest pain, palpitations and leg swelling.   Gastrointestinal:  Negative for abdominal pain, nausea and vomiting.   Endocrine: Negative for polydipsia and polyuria.   Genitourinary:  Negative for difficulty urinating and hematuria.   Skin:  Negative for rash and wound.   Psychiatric/Behavioral:  Negative for decreased concentration and suicidal ideas. The patient is not nervous/anxious.        Ortho Exam  Left Shoulder Exam      Range of Motion   The patient has normal left shoulder ROM.     Other   Erythema: absent  Scars: absent  Sensation: normal  Pulse: present      Comments:    4/5 ER at 0 5/5 IR  4/5 empty  "can  + Gallegos  4/5 [employer]  Brisk capillary refill  Sensation intact to Ax/R/M/U nerve distributions    Physical Exam  Constitutional:       Appearance: He is well-developed.   HENT:      Head: Normocephalic.   Eyes:      Conjunctiva/sclera: Conjunctivae normal.   Cardiovascular:      Rate and Rhythm: Normal rate.   Pulmonary:      Effort: Pulmonary effort is normal.   Musculoskeletal:      Cervical back: Normal range of motion.   Skin:     General: Skin is warm and dry.   Neurological:      Mental Status: He is alert and oriented to person, place, and time.         Large joint arthrocentesis: L subacromial bursa  Universal Protocol:  Consent: Verbal consent obtained.  Risks and benefits: risks, benefits and alternatives were discussed  Consent given by: patient  Time out: Immediately prior to procedure a \"time out\" was called to verify the correct patient, procedure, equipment, support staff and site/side marked as required.  Timeout called at: 9/23/2024 4:58 PM.  Patient understanding: patient states understanding of the procedure being performed  Site marked: the operative site was marked  Patient identity confirmed: verbally with patient  Supporting Documentation  Indications: pain   Procedure Details  Location: shoulder - L subacromial bursa  Preparation: Patient was prepped and draped in the usual sterile fashion  Needle size: 22 G  Ultrasound guidance: no  Approach: anterolateral  Medications administered: 1 mL lidocaine 1 %; 0.5 mL lidocaine 1 %; 9 mg betamethasone acetate-betamethasone sodium phosphate 6 (3-3) mg/mL    Patient tolerance: patient tolerated the procedure well with no immediate complications  Dressing:  Sterile dressing applied          Left shoulder MRI of 8/30/2024:  Moderate subacromial spur with supraspinatus/infraspinatus insertional tendinosis including a focal full-thickness supraspinatus tendon tear measuring up to 3 mm in transverse dimension at the posterior insertion .  Mild " "biceps tendinosis without tear.  Superior glenohumeral degeneration.        Scribe Attestation      I,:  Juancarlos Tijerina MA am acting as a scribe while in the presence of the attending physician.:       I,:  Marcos Blackburn DO personally performed the services described in this documentation    as scribed in my presence.:               Portions of the record may have been created with voice recognition software.  Occasional wrong word or \"sound a like\" substitutions may have occurred due to the inherent limitations of voice recognition software.  Read the chart carefully and recognize, using context, where substitutions have occurred.  "

## 2024-09-24 ENCOUNTER — OFFICE VISIT (OUTPATIENT)
Dept: PHYSICAL THERAPY | Facility: CLINIC | Age: 64
End: 2024-09-24
Payer: COMMERCIAL

## 2024-09-24 DIAGNOSIS — M75.102 NONTRAUMATIC TEAR OF LEFT ROTATOR CUFF, UNSPECIFIED TEAR EXTENT: Primary | ICD-10-CM

## 2024-09-24 DIAGNOSIS — M25.512 LEFT SHOULDER PAIN, UNSPECIFIED CHRONICITY: ICD-10-CM

## 2024-09-24 PROCEDURE — 97110 THERAPEUTIC EXERCISES: CPT

## 2024-09-24 NOTE — PROGRESS NOTES
"Daily Note     Today's date: 2024  Patient name: Jose Lara  : 1960  MRN: [id number]  Referring provider: Marcos Blackburn DO  Dx:   Encounter Diagnosis     ICD-10-CM    1. Nontraumatic tear of left rotator cuff, unspecified tear extent  M75.102       2. Left shoulder pain, unspecified chronicity  M25.512             Start Time: 1730  Stop Time: 1802  Total time in clinic (min): 32 minutes    Subjective: Patient reports good response from a steroid injection by his ortho doc. Notes \"1/10\" pain prior to therapy.     Objective: See treatment diary below.      Assessment: Patient able to complete TE with no increase in pain. Good form demonstrated with minimal cueing required.    Plan: Continue per plan of care.      Dx: L partial RTC tear.    Manuals      L shoulder IR PROM  10 min 10 min 10 min LH 10 min 15\"x10 8 min     Gr IV inf GH mobs       1x30                                Neuro Re-Ed                                                                                                        Ther Ex             Ecc TB ER R/ 2x10, G/ 1x10 G/ 3x10 G/  3x10 G/ 3x12 GTB 3x12  GTB  3x15 Efraín/ 3x10 Efraín/ 3x10     wallslides  3x10 3x10 3x15 3x15 3x15 1#/ 3x10 1#/ 3x10     Prone T  3x10* 3x10 Pball 3x10 Pball 3x10 Pball  3x12 Pball/ 3x12 Pball/ 3x12     Prone ER    Pball/ 3x10 Pball 3x10 Pball  3x10 Pball/ 3x12 Pball/ 3x12     Standing rep sh ext pt OP  3x10 3x10  2x10 3x10 3x10 3x10 3x10 3x10     Standing rep sh IR pt OP 1x10 3x10 3x10                                                              Ther Activity                                       Gait Training                                       Modalities                                          "

## 2024-10-03 ENCOUNTER — OFFICE VISIT (OUTPATIENT)
Dept: PHYSICAL THERAPY | Facility: CLINIC | Age: 64
End: 2024-10-03
Payer: COMMERCIAL

## 2024-10-03 DIAGNOSIS — M75.102 NONTRAUMATIC TEAR OF LEFT ROTATOR CUFF, UNSPECIFIED TEAR EXTENT: Primary | ICD-10-CM

## 2024-10-03 DIAGNOSIS — M25.512 LEFT SHOULDER PAIN, UNSPECIFIED CHRONICITY: ICD-10-CM

## 2024-10-03 PROCEDURE — 97140 MANUAL THERAPY 1/> REGIONS: CPT | Performed by: PHYSICAL THERAPIST

## 2024-10-03 PROCEDURE — 97110 THERAPEUTIC EXERCISES: CPT | Performed by: PHYSICAL THERAPIST

## 2024-10-03 NOTE — PROGRESS NOTES
"Daily Note     Today's date: 10/3/2024  Patient name: Jose Lara  : 1960  MRN: [id number]  Referring provider: Marcos Blackburn DO  Dx:   Encounter Diagnosis     ICD-10-CM    1. Nontraumatic tear of left rotator cuff, unspecified tear extent  M75.102       2. Left shoulder pain, unspecified chronicity  M25.512                        Subjective: Patient reports 2/10 L shoulder pain.      Objective: See treatment diary below.      Assessment: Patient demonstrated improved lifting tolerance.    Plan: Continue per plan of care.      Dx: L partial RTC tear.    Manuals 8/20 8/22 8/27 8/29 9/5 9/12 9/19 9/24 10/3     L shoulder IR PROM  10 min 10 min 10 min LH 10 min 15\"x10 8 min 6 min    Gr IV inf GH mobs       1x30  1x20                              Neuro Re-Ed                                                                                                        Ther Ex             Ecc TB ER R/ 2x10, G/ 1x10 G/ 3x10 G/  3x10 G/ 3x12 GTB 3x12  GTB  3x15 Efraín/ 3x10 Efraín/ 3x10 Efraín/ 3x12    wallslides  3x10 3x10 3x15 3x15 3x15 1#/ 3x10 1#/ 3x10 1.5#/ 3x10    Prone T  3x10* 3x10 Pball 3x10 Pball 3x10 Pball  3x12 Pball/ 3x12 Pball/ 3x12 Pball/ 1#/ 3x10    Prone ER    Pball/ 3x10 Pball 3x10 Pball  3x10 Pball/ 3x12 Pball/ 3x12 Pball/ 1# / 3x10    Standing rep sh ext pt OP  3x10 3x10  2x10 3x10 3x10 3x10 3x10 3x10 D/c    Standing rep sh IR pt OP 1x10 3x10 3x10      D/c    Lateral raises         2#/ 3x10                                           Ther Activity                                       Gait Training                                       Modalities                                          "

## 2024-10-10 ENCOUNTER — OFFICE VISIT (OUTPATIENT)
Dept: PHYSICAL THERAPY | Facility: CLINIC | Age: 64
End: 2024-10-10
Payer: COMMERCIAL

## 2024-10-10 DIAGNOSIS — M75.102 NONTRAUMATIC TEAR OF LEFT ROTATOR CUFF, UNSPECIFIED TEAR EXTENT: Primary | ICD-10-CM

## 2024-10-10 DIAGNOSIS — M25.512 LEFT SHOULDER PAIN, UNSPECIFIED CHRONICITY: ICD-10-CM

## 2024-10-10 PROCEDURE — 97110 THERAPEUTIC EXERCISES: CPT | Performed by: PHYSICAL THERAPIST

## 2024-10-10 PROCEDURE — 97140 MANUAL THERAPY 1/> REGIONS: CPT | Performed by: PHYSICAL THERAPIST

## 2024-10-10 NOTE — PROGRESS NOTES
"Daily Note     Today's date: 10/10/2024  Patient name: Jose Lara  : 1960  MRN: [id number]  Referring provider: Marcos Blackburn DO  Dx:   Encounter Diagnosis     ICD-10-CM    1. Nontraumatic tear of left rotator cuff, unspecified tear extent  M75.102       2. Left shoulder pain, unspecified chronicity  M25.512                        Subjective: Patient reports 1/10 L shoulder pain.      Objective: See treatment diary below.      Assessment: Patient demonstrated improved pain-free PROM ABD.    Plan: Continue per plan of care.      Dx: L partial RTC tear.    Manuals 8/20 8/22 8/27 8/29 9/5 9/12 9/19 9/24 10/3  10/10   L shoulder IR PROM  10 min 10 min 10 min LH 10 min 15\"x10 8 min 6 min 6 min   Gr IV inf GH mobs       1x30  1x20 1x20                             Neuro Re-Ed                                                                                                        Ther Ex             Ecc TB ER R/ 2x10, G/ 1x10 G/ 3x10 G/  3x10 G/ 3x12 GTB 3x12  GTB  3x15 Efraín/ 3x10 Efraín/ 3x10 Efraín/ 3x12 Efraín/ 3x12   wallslides  3x10 3x10 3x15 3x15 3x15 1#/ 3x10 1#/ 3x10 1.5#/ 3x10 1.5#/ 3x10   Prone T  3x10* 3x10 Pball 3x10 Pball 3x10 Pball  3x12 Pball/ 3x12 Pball/ 3x12 Pball/ 1#/ 3x10 Pball/ 1#/ 3x10   Prone ER    Pball/ 3x10 Pball 3x10 Pball  3x10 Pball/ 3x12 Pball/ 3x12 Pball/ 1# / 3x10 Pball/ 1#/ 3x10   Standing rep sh ext pt OP  3x10 3x10  2x10 3x10 3x10 3x10 3x10 3x10 D/c    Standing rep sh IR pt OP 1x10 3x10 3x10      D/c    Lateral raises         2#/ 3x10 3#/ 3x10   Standing mod sleeper stretch          10\"x5                             Ther Activity                                       Gait Training                                       Modalities                                          "

## 2024-10-17 ENCOUNTER — OFFICE VISIT (OUTPATIENT)
Dept: PHYSICAL THERAPY | Facility: CLINIC | Age: 64
End: 2024-10-17
Payer: COMMERCIAL

## 2024-10-17 DIAGNOSIS — M25.512 LEFT SHOULDER PAIN, UNSPECIFIED CHRONICITY: ICD-10-CM

## 2024-10-17 DIAGNOSIS — M75.102 NONTRAUMATIC TEAR OF LEFT ROTATOR CUFF, UNSPECIFIED TEAR EXTENT: Primary | ICD-10-CM

## 2024-10-17 PROCEDURE — 97140 MANUAL THERAPY 1/> REGIONS: CPT | Performed by: PHYSICAL THERAPIST

## 2024-10-17 PROCEDURE — 97110 THERAPEUTIC EXERCISES: CPT | Performed by: PHYSICAL THERAPIST

## 2024-10-17 NOTE — PROGRESS NOTES
"Daily Note     Today's date: 10/17/2024  Patient name: Jose Lara  : 1960  MRN: [id number]  Referring provider: Marcos Blackburn DO  Dx:   Encounter Diagnosis     ICD-10-CM    1. Nontraumatic tear of left rotator cuff, unspecified tear extent  M75.102       2. Left shoulder pain, unspecified chronicity  M25.512                        Subjective: Patient reports 2/10 L shoulder pain.      Objective: See treatment diary below.      Assessment: Patient demonstrated improving strength and ROM.    Plan: Continue per plan of care.      Dx: L partial RTC tear.    Manuals 10/17         10/10   L shoulder IR PROM 6 min         6 min   Gr IV inf GH mobs 1x20         1x20                             Neuro Re-Ed                                                                                                        Ther Ex             Ecc TB ER Efraín/ 3x15 Hoang/ 3x10        Efraín/ 3x12   wallslides 2#/ 3x10         1.5#/ 3x10   Prone T Pball 1#/ 3x12         Pball/ 1#/ 3x10   Prone ER Pball/ 1#/ 3x12         Pball/ 1#/ 3x10                             Lateral raises 3#/ 3x10         3#/ 3x10   Standing mod sleeper stretch 10\"x5         10\"x5                             Ther Activity                                       Gait Training                                       Modalities                                          "

## 2024-10-24 ENCOUNTER — OFFICE VISIT (OUTPATIENT)
Dept: PHYSICAL THERAPY | Facility: CLINIC | Age: 64
End: 2024-10-24
Payer: COMMERCIAL

## 2024-10-24 ENCOUNTER — APPOINTMENT (OUTPATIENT)
Dept: PHYSICAL THERAPY | Facility: CLINIC | Age: 64
End: 2024-10-24
Payer: COMMERCIAL

## 2024-10-24 DIAGNOSIS — M25.512 LEFT SHOULDER PAIN, UNSPECIFIED CHRONICITY: ICD-10-CM

## 2024-10-24 DIAGNOSIS — M75.102 NONTRAUMATIC TEAR OF LEFT ROTATOR CUFF, UNSPECIFIED TEAR EXTENT: Primary | ICD-10-CM

## 2024-10-24 PROCEDURE — 97110 THERAPEUTIC EXERCISES: CPT | Performed by: PHYSICAL THERAPIST

## 2024-10-24 NOTE — PROGRESS NOTES
"Daily Note     Today's date: 10/24/2024  Patient name: Jose Lara  : 1960  MRN: [id number]  Referring provider: Marcos Blackburn DO  Dx:   Encounter Diagnosis     ICD-10-CM    1. Nontraumatic tear of left rotator cuff, unspecified tear extent  M75.102       2. Left shoulder pain, unspecified chronicity  M25.512                        Subjective: Patient reports 2/10 L shoulder pain.      Objective: See treatment diary below.      Assessment: Patient restricted and painful ABD.    Plan: Continue per plan of care. F/u in 2 weeks for possible d/c to [facility].     Dx: L partial RTC tear.    Manuals 10/17 10/24        1010   L shoulder PROM 6 min 6 min        6 min   Gr IV inf GH mobs 1x20 1x20        1x20                             Neuro Re-Ed                                                                                                        Ther Ex             Ecc TB ER Efraín/ 3x15 Efraín/ 3x15        Efraín/ 3x12   wallslides 2#/ 3x10 2#/ 3x10        1.5#/ 3x10   Prone T Pball 1#/ 3x12 Pball 1#/ 3x12        Pball/ 1#/ 3x10   Prone ER Pball/ 1#/ 3x12 Pball/ 1#/ 3x12        Pball/ 1#/ 3x10                             Lateral raises 3#/ 3x10 3#/ 3x10        3#/ 3x10   Standing mod sleeper stretch 10\"x5         10\"x5                             Ther Activity                                       Gait Training                                       Modalities                                          "

## 2024-10-30 ENCOUNTER — APPOINTMENT (OUTPATIENT)
Dept: PHYSICAL THERAPY | Facility: CLINIC | Age: 64
End: 2024-10-30
Payer: COMMERCIAL

## 2024-10-31 ENCOUNTER — APPOINTMENT (OUTPATIENT)
Dept: PHYSICAL THERAPY | Facility: CLINIC | Age: 64
End: 2024-10-31
Payer: COMMERCIAL

## 2024-11-07 ENCOUNTER — OFFICE VISIT (OUTPATIENT)
Dept: PHYSICAL THERAPY | Facility: CLINIC | Age: 64
End: 2024-11-07
Payer: COMMERCIAL

## 2024-11-07 DIAGNOSIS — M75.102 NONTRAUMATIC TEAR OF LEFT ROTATOR CUFF, UNSPECIFIED TEAR EXTENT: Primary | ICD-10-CM

## 2024-11-07 DIAGNOSIS — M25.512 LEFT SHOULDER PAIN, UNSPECIFIED CHRONICITY: ICD-10-CM

## 2024-11-07 PROCEDURE — 97140 MANUAL THERAPY 1/> REGIONS: CPT | Performed by: PHYSICAL THERAPIST

## 2024-11-07 PROCEDURE — 97110 THERAPEUTIC EXERCISES: CPT | Performed by: PHYSICAL THERAPIST

## 2024-11-07 NOTE — PROGRESS NOTES
"Daily Note     Today's date: 2024  Patient name: Jose Lara  : 1960  MRN: [id number]  Referring provider: Marcos Blackburn DO  Dx:   Encounter Diagnosis     ICD-10-CM    1. Nontraumatic tear of left rotator cuff, unspecified tear extent  M75.102       2. Left shoulder pain, unspecified chronicity  M25.512                        Subjective: Patient reports 2/10 L shoulder pain.      Objective: See treatment diary below.      Assessment: Pt demonstrated full sh ROM with pain at ER, 4-/5 ER MMT, no functional limitations.    Plan: D/c to HEP.     Dx: L partial RTC tear.    Manuals 10/17 10/24 11/7       10/10   L shoulder PROM 6 min 6 min 6 min       6 min   Gr IV inf GH mobs 1x20 1x20 1x20       1x20                             Neuro Re-Ed                                                                                                        Ther Ex             Ecc TB ER Efraín/ 3x15 Efraín/ 3x15 Efraín/ 3x15       Efraín/ 3x12   wallslides 2#/ 3x10 2#/ 3x10 2#/ 3x10       1.5#/ 3x10   Prone T Pball 1#/ 3x12 Pball 1#/ 3x12 Pball 1#/ 3x12       Pball/ 1#/ 3x10   Prone ER Pball/ 1#/ 3x12 Pball/ 1#/ 3x12 Pball 1#/ 3x12       Pball/ 1#/ 3x10                             Lateral raises 3#/ 3x10 3#/ 3x10 3#/ 3x10       3#/ 3x10   Standing mod sleeper stretch 10\"x5         10\"x5                             Ther Activity                                       Gait Training                                       Modalities                                          "

## 2024-11-26 ENCOUNTER — APPOINTMENT (OUTPATIENT)
Dept: LAB | Facility: AMBULARY SURGERY CENTER | Age: 64
End: 2024-11-26
Payer: COMMERCIAL

## 2024-11-26 DIAGNOSIS — Z00.00 HEALTHCARE MAINTENANCE: ICD-10-CM

## 2024-11-26 LAB
ALBUMIN SERPL BCG-MCNC: 4.4 G/DL (ref 3.5–5)
ALP SERPL-CCNC: 54 U/L (ref 34–104)
ALT SERPL W P-5'-P-CCNC: 30 U/L (ref 7–52)
ANION GAP SERPL CALCULATED.3IONS-SCNC: 10 MMOL/L (ref 4–13)
AST SERPL W P-5'-P-CCNC: 22 U/L (ref 5–45)
BACTERIA UR QL AUTO: ABNORMAL /HPF
BASOPHILS # BLD AUTO: 0.04 THOUSANDS/ΜL (ref 0–0.1)
BASOPHILS NFR BLD AUTO: 1 % (ref 0–1)
BILIRUB SERPL-MCNC: 0.72 MG/DL (ref 0.2–1)
BILIRUB UR QL STRIP: NEGATIVE
BUN SERPL-MCNC: 15 MG/DL (ref 5–25)
CALCIUM SERPL-MCNC: 9.4 MG/DL (ref 8.4–10.2)
CHLORIDE SERPL-SCNC: 103 MMOL/L (ref 96–108)
CHOLEST SERPL-MCNC: 171 MG/DL (ref ?–200)
CLARITY UR: CLEAR
CO2 SERPL-SCNC: 27 MMOL/L (ref 21–32)
COLOR UR: YELLOW
CREAT SERPL-MCNC: 0.92 MG/DL (ref 0.6–1.3)
EOSINOPHIL # BLD AUTO: 0.18 THOUSAND/ΜL (ref 0–0.61)
EOSINOPHIL NFR BLD AUTO: 3 % (ref 0–6)
ERYTHROCYTE [DISTWIDTH] IN BLOOD BY AUTOMATED COUNT: 13.2 % (ref 11.6–15.1)
GLUCOSE P FAST SERPL-MCNC: 95 MG/DL (ref 65–99)
GLUCOSE UR STRIP-MCNC: NEGATIVE MG/DL
HCT VFR BLD AUTO: 47.6 % (ref 36.5–46.1)
HDLC SERPL-MCNC: 47 MG/DL
HGB BLD-MCNC: 15.9 G/DL (ref 12–15.4)
HGB UR QL STRIP.AUTO: NEGATIVE
IMM GRANULOCYTES # BLD AUTO: 0.02 THOUSAND/UL (ref 0–0.2)
IMM GRANULOCYTES NFR BLD AUTO: 0 % (ref 0–2)
KETONES UR STRIP-MCNC: NEGATIVE MG/DL
LDLC SERPL CALC-MCNC: 100 MG/DL (ref 0–100)
LEUKOCYTE ESTERASE UR QL STRIP: NEGATIVE
LYMPHOCYTES # BLD AUTO: 1.16 THOUSANDS/ΜL (ref 0.6–4.47)
LYMPHOCYTES NFR BLD AUTO: 17 % (ref 14–44)
MCH RBC QN AUTO: 29 PG (ref 26.8–34.3)
MCHC RBC AUTO-ENTMCNC: 33.4 G/DL (ref 31.4–37.4)
MCV RBC AUTO: 87 FL (ref 82–98)
MONOCYTES # BLD AUTO: 0.53 THOUSAND/ΜL (ref 0.17–1.22)
MONOCYTES NFR BLD AUTO: 8 % (ref 4–12)
MUCOUS THREADS UR QL AUTO: ABNORMAL
NEUTROPHILS # BLD AUTO: 4.96 THOUSANDS/ΜL (ref 1.85–7.62)
NEUTS SEG NFR BLD AUTO: 71 % (ref 43–75)
NITRITE UR QL STRIP: NEGATIVE
NON-SQ EPI CELLS URNS QL MICRO: ABNORMAL /HPF
NONHDLC SERPL-MCNC: 124 MG/DL
NRBC BLD AUTO-RTO: 0 /100 WBCS
PH UR STRIP.AUTO: 6.5 [PH]
PLATELET # BLD AUTO: 277 THOUSANDS/UL (ref 149–390)
PMV BLD AUTO: 9.7 FL (ref 8.9–12.7)
POTASSIUM SERPL-SCNC: 4.1 MMOL/L (ref 3.5–5.3)
PROT SERPL-MCNC: 6.8 G/DL (ref 6.4–8.4)
PROT UR STRIP-MCNC: ABNORMAL MG/DL
RBC # BLD AUTO: 5.49 MILLION/UL (ref 3.88–5.12)
RBC #/AREA URNS AUTO: ABNORMAL /HPF
SODIUM SERPL-SCNC: 140 MMOL/L (ref 135–147)
SP GR UR STRIP.AUTO: 1.02 (ref 1–1.03)
TRIGL SERPL-MCNC: 122 MG/DL (ref ?–150)
UROBILINOGEN UR STRIP-ACNC: <2 MG/DL
WBC # BLD AUTO: 6.89 THOUSAND/UL (ref 4.31–10.16)
WBC #/AREA URNS AUTO: ABNORMAL /HPF

## 2024-11-26 PROCEDURE — 80053 COMPREHEN METABOLIC PANEL: CPT

## 2024-11-26 PROCEDURE — 36415 COLL VENOUS BLD VENIPUNCTURE: CPT

## 2024-11-26 PROCEDURE — 85025 COMPLETE CBC W/AUTO DIFF WBC: CPT

## 2024-11-26 PROCEDURE — 81001 URINALYSIS AUTO W/SCOPE: CPT

## 2024-11-26 PROCEDURE — 80061 LIPID PANEL: CPT

## 2024-12-13 ENCOUNTER — OFFICE VISIT (OUTPATIENT)
Dept: SLEEP CENTER | Facility: CLINIC | Age: 64
End: 2024-12-13
Payer: COMMERCIAL

## 2024-12-13 VITALS
OXYGEN SATURATION: 100 % | HEART RATE: 73 BPM | WEIGHT: 222 LBS | BODY MASS INDEX: 31.78 KG/M2 | HEIGHT: 70 IN | DIASTOLIC BLOOD PRESSURE: 84 MMHG | SYSTOLIC BLOOD PRESSURE: 128 MMHG

## 2024-12-13 DIAGNOSIS — J34.2 DEVIATED NASAL SEPTUM: ICD-10-CM

## 2024-12-13 DIAGNOSIS — G47.19 EXCESSIVE DAYTIME SLEEPINESS: ICD-10-CM

## 2024-12-13 DIAGNOSIS — E66.9 OBESITY (BMI 30-39.9): ICD-10-CM

## 2024-12-13 DIAGNOSIS — G47.33 OSA (OBSTRUCTIVE SLEEP APNEA): Primary | ICD-10-CM

## 2024-12-13 DIAGNOSIS — G47.61 PLMD (PERIODIC LIMB MOVEMENT DISORDER): ICD-10-CM

## 2024-12-13 DIAGNOSIS — G47.09 OTHER INSOMNIA: ICD-10-CM

## 2024-12-13 PROCEDURE — 99214 OFFICE O/P EST MOD 30 MIN: CPT | Performed by: INTERNAL MEDICINE

## 2024-12-16 ENCOUNTER — TELEPHONE (OUTPATIENT)
Dept: SLEEP CENTER | Facility: CLINIC | Age: 64
End: 2024-12-16

## 2024-12-20 LAB
DME PARACHUTE DELIVERY DATE REQUESTED: NORMAL
DME PARACHUTE ITEM DESCRIPTION: NORMAL
DME PARACHUTE ORDER STATUS: NORMAL
DME PARACHUTE SUPPLIER NAME: NORMAL
DME PARACHUTE SUPPLIER PHONE: NORMAL

## 2024-12-30 ENCOUNTER — ANESTHESIA EVENT (OUTPATIENT)
Dept: GASTROENTEROLOGY | Facility: HOSPITAL | Age: 64
End: 2024-12-30
Payer: COMMERCIAL

## 2024-12-30 ENCOUNTER — ANESTHESIA (OUTPATIENT)
Dept: GASTROENTEROLOGY | Facility: HOSPITAL | Age: 64
End: 2024-12-30
Payer: COMMERCIAL

## 2024-12-30 ENCOUNTER — HOSPITAL ENCOUNTER (OUTPATIENT)
Dept: GASTROENTEROLOGY | Facility: HOSPITAL | Age: 64
Setting detail: OUTPATIENT SURGERY
Discharge: HOME | End: 2024-12-30
Attending: COLON & RECTAL SURGERY
Payer: COMMERCIAL

## 2024-12-30 VITALS
WEIGHT: 210 LBS | RESPIRATION RATE: 20 BRPM | TEMPERATURE: 96.4 F | HEART RATE: 58 BPM | DIASTOLIC BLOOD PRESSURE: 66 MMHG | SYSTOLIC BLOOD PRESSURE: 107 MMHG | OXYGEN SATURATION: 97 % | HEIGHT: 70 IN | BODY MASS INDEX: 30.06 KG/M2

## 2024-12-30 DIAGNOSIS — Z86.0100 HISTORY OF COLON POLYPS: ICD-10-CM

## 2024-12-30 PROCEDURE — 88305 TISSUE EXAM BY PATHOLOGIST: CPT | Performed by: PATHOLOGY

## 2024-12-30 PROCEDURE — 45385 COLONOSCOPY W/LESION REMOVAL: CPT | Performed by: COLON & RECTAL SURGERY

## 2024-12-30 RX ORDER — PROPOFOL 10 MG/ML
INJECTION, EMULSION INTRAVENOUS AS NEEDED
Status: DISCONTINUED | OUTPATIENT
Start: 2024-12-30 | End: 2024-12-30

## 2024-12-30 RX ORDER — SODIUM CHLORIDE 9 MG/ML
INJECTION, SOLUTION INTRAVENOUS CONTINUOUS PRN
Status: DISCONTINUED | OUTPATIENT
Start: 2024-12-30 | End: 2024-12-30

## 2024-12-30 RX ADMIN — PROPOFOL 100 MG: 10 INJECTION, EMULSION INTRAVENOUS at 08:15

## 2024-12-30 RX ADMIN — SODIUM CHLORIDE: 0.9 INJECTION, SOLUTION INTRAVENOUS at 08:13

## 2024-12-30 RX ADMIN — PROPOFOL 100 MCG/KG/MIN: 10 INJECTION, EMULSION INTRAVENOUS at 08:16

## 2024-12-30 NOTE — ANESTHESIA PREPROCEDURE EVALUATION
Procedure:  COLONOSCOPY    Relevant Problems   CARDIO   (+) Familial hypercholesterolemia      MUSCULOSKELETAL   (+) Chronic right-sided low back pain with sciatica      NEURO/PSYCH   (+) Chronic right-sided low back pain with sciatica      PULMONARY   (+) KEVIN (obstructive sleep apnea)     >4 METS    NPO Saturday night  8pm for food  Liquid 4am this morning  Physical Exam    Airway    Mallampati score: II         Dental        Cardiovascular  Cardiovascular exam normal    Pulmonary  Pulmonary exam normal     Other Findings        Anesthesia Plan  ASA Score- 2     Anesthesia Type- IV sedation with anesthesia with ASA Monitors.         Additional Monitors:     Airway Plan:            Plan Factors-Exercise tolerance (METS): >4 METS.    Chart reviewed.    Patient summary reviewed.    Patient is not a current smoker.              Induction- intravenous.    Postoperative Plan-         Informed Consent- Anesthetic plan and risks discussed with patient.  I personally reviewed this patient with the CRNA. Discussed and agreed on the Anesthesia Plan with the CRNA..

## 2024-12-30 NOTE — H&P
"History and Physical   Colon and Rectal Surgery   Jose Lara 64 y.o. male MRN: [id number]  Unit/Bed#:  Encounter: [id number]  12/30/24   8:08 AM      CC:  History of polyps, sessile serrated adenomas.    History of Present Illness   HPI:  Jose Lara is a 64 y.o. male with no GI symptoms.  Historical Information   Past Medical History:   Diagnosis Date    Hyperlipidemia      History reviewed. No pertinent surgical history.    Meds/Allergies     Not in a hospital admission.      Current Outpatient Medications:     ezetimibe (ZETIA) 10 mg tablet, TAKE 1 TABLET DAILY, Disp: 90 tablet, Rfl: 3    rosuvastatin (CRESTOR) 10 MG tablet, TAKE 1 TABLET DAILY, Disp: 100 tablet, Rfl: 1    meloxicam (MOBIC) 15 mg tablet, TAKE ONE TABLET BY MOUTH EVERY DAY (Patient not taking: Reported on 12/13/2024), Disp: 30 tablet, Rfl: 5    Current Facility-Administered Medications:     betamethasone acetate-betamethasone sodium phosphate (CELESTONE) injection 9 mg, 9 mg, Intra-articular, , , 9 mg at 09/23/24 1645    lidocaine (XYLOCAINE) 1 % injection 0.5 mL, 0.5 mL, Injection, , , 0.5 mL at 09/23/24 1645    lidocaine (XYLOCAINE) 1 % injection 1 mL, 1 mL, Injection, , , 1 mL at 09/23/24 1645    No Known Allergies      Social History   Social History     Substance and Sexual Activity   Alcohol Use Yes     Social History     Substance and Sexual Activity   Drug Use No     Social History     Tobacco Use   Smoking Status Never   Smokeless Tobacco Never         Family History: History reviewed. No pertinent family history.      Objective     Current Vitals:   Blood Pressure: 156/83 (12/30/24 0722)  Pulse: 75 (12/30/24 0722)  Temperature: (!) 97.1 °F (36.2 °C) (12/30/24 0722)  Temp Source: Tympanic (12/30/24 0722)  Respirations: 18 (12/30/24 0722)  Height: 5' 10\" (177.8 cm) (12/30/24 0722)  Weight - Scale: 95.3 kg (210 lb) (12/30/24 0722)  SpO2: 98 % (12/30/24 0722)  No intake or output data in the 24 hours ending 12/30/24 0808    Physical " Exam:  General:  Well nourished, no distress.  Neuro: Alert and oriented  Eyes:Sclera anicteric, conjunctiva pink.  Pulm: Clear to auscultation bilaterally. No respiratory Distress.   CV:  Regular rate and rhythm. No murmurs.  Abdomen:  Soft, flat, non-tender, without masses or hepatosplenomegaly.    Lab Results:       ASSESSMENT:  Jose Lara is a 64 y.o. male for surveillance.  PLAN:  Colonoscopy.  Risks , including, but not limited to, bleeding, perforation, missed lesions, and potential need for surgery, were reviewed. Alternatives to colonoscopy were discussed.  TREVOR Pereira MD

## 2024-12-31 ENCOUNTER — OFFICE VISIT (OUTPATIENT)
Age: 64
End: 2024-12-31
Payer: COMMERCIAL

## 2024-12-31 ENCOUNTER — RESULTS FOLLOW-UP (OUTPATIENT)
Dept: OTHER | Facility: HOSPITAL | Age: 64
End: 2024-12-31

## 2024-12-31 VITALS
WEIGHT: 215 LBS | RESPIRATION RATE: 18 BRPM | HEIGHT: 70 IN | SYSTOLIC BLOOD PRESSURE: 122 MMHG | TEMPERATURE: 98 F | BODY MASS INDEX: 30.78 KG/M2 | HEART RATE: 92 BPM | OXYGEN SATURATION: 98 % | DIASTOLIC BLOOD PRESSURE: 72 MMHG

## 2024-12-31 DIAGNOSIS — H91.93 DECREASED HEARING OF BOTH EARS: ICD-10-CM

## 2024-12-31 DIAGNOSIS — N39.3 STRESS INCONTINENCE OF URINE: Primary | ICD-10-CM

## 2024-12-31 DIAGNOSIS — Z00.00 HEALTHCARE MAINTENANCE: ICD-10-CM

## 2024-12-31 DIAGNOSIS — Z12.5 PROSTATE CANCER SCREENING: ICD-10-CM

## 2024-12-31 DIAGNOSIS — E78.01 FAMILIAL HYPERCHOLESTEROLEMIA: ICD-10-CM

## 2024-12-31 DIAGNOSIS — G47.33 OSA (OBSTRUCTIVE SLEEP APNEA): ICD-10-CM

## 2024-12-31 PROCEDURE — 99396 PREV VISIT EST AGE 40-64: CPT | Performed by: INTERNAL MEDICINE

## 2024-12-31 PROCEDURE — 88305 TISSUE EXAM BY PATHOLOGIST: CPT | Performed by: PATHOLOGY

## 2024-12-31 NOTE — ASSESSMENT & PLAN NOTE
Patient is here today for yearly physical.  He did have extensive lab testing performed prior to the visit today we did discuss the results at length with the patient showing no abnormalities that we need to address at this point in time.  The lab did not perform a PSA as ordered but this will be done in the near future.  Patient did undergo routine colonoscopy yesterday and he did have some benign-appearing polyps and will have a repeat study performed in 5 years.  In general he states he has been feeling well and he has a new exercise program trying to go out for long walks on a daily basis.  No new complaints and he did undergo physical exam today in the office but we did defer a prostate and colon rectal exam with him just having colonoscopy yesterday.  Will have PSA performed in the near future and return to the office in 6 months check a lipid profile at that time.

## 2024-12-31 NOTE — PROGRESS NOTES
Name: Jose Lara      : 1960      MRN: [id number]  Encounter Provider: Ramon Townsend DO  Encounter Date: 2024   Encounter department: [facility] INTERNAL MEDICINE    Assessment & Plan  Prostate cancer screening    Orders:    PSA, Total Screen; Future    Familial hypercholesterolemia  Patient is on a combination of Crestor 10 mg daily along with Zetia.  Cholesterol is under excellent control.  I did tell the patient the importance as always watching his intake of fats and cholesterol with his diet.    Orders:    Lipid panel; Future    Stress incontinence of urine  States that he is still having problems occasionally with urinary leakage.  No burning with urination and nocturia x 1 without change.  We do have concerns about his urinary difficulties and have arranged for the patient to be seen by urology for further evaluation and/or treatment.  Again check PSA    Orders:    Ambulatory Referral to Urology; Future    Healthcare maintenance  Patient is here today for yearly physical.  He did have extensive lab testing performed prior to the visit today we did discuss the results at length with the patient showing no abnormalities that we need to address at this point in time.  The lab did not perform a PSA as ordered but this will be done in the near future.  Patient did undergo routine colonoscopy yesterday and he did have some benign-appearing polyps and will have a repeat study performed in 5 years.  In general he states he has been feeling well and he has a new exercise program trying to go out for long walks on a daily basis.  No new complaints and he did undergo physical exam today in the office but we did defer a prostate and colon rectal exam with him just having colonoscopy yesterday.  Will have PSA performed in the near future and return to the office in 6 months check a lipid profile at that time.         KEVIN (obstructive sleep apnea)  Continues to wear his CPAP device on a  nightly basis.         Decreased hearing of both ears  Bilateral hearing aids which she states has made a significant difference in his acuity.              History of Present Illness     Patient is a 64-year-old male history of extensive medical problems outlined previously who is here today for a general physical.  Patient states he is doing well and just underwent a routine colonoscopy yesterday.  No new complaints but still is having some difficulty with her urinary incontinence.    Review of Systems   Constitutional:  Positive for activity change. Negative for appetite change, chills, diaphoresis, fatigue, fever and unexpected weight change.        Patient has started new exercise program and he is trying to walk on a daily basis.  States he is feels more energetic.   HENT: Negative.     Eyes: Negative.    Respiratory: Negative.     Cardiovascular: Negative.    Gastrointestinal: Negative.    Endocrine: Negative.    Genitourinary:  Positive for difficulty urinating. Negative for decreased urine volume, dysuria, enuresis, flank pain, frequency, genital sores, hematuria, penile discharge, penile pain, penile swelling, scrotal swelling, testicular pain and urgency.        Difficulties with some stress incontinence   Musculoskeletal: Negative.         Chronic arthritis and back pain but nothing acute   Skin: Negative.    Allergic/Immunologic: Negative.    Neurological: Negative.    Hematological: Negative.    Psychiatric/Behavioral: Negative.       Past Medical History:   Diagnosis Date    Hyperlipidemia      No past surgical history on file.  No family history on file.  Social History     Tobacco Use    Smoking status: Never    Smokeless tobacco: Never   Vaping Use    Vaping status: Never Used   Substance and Sexual Activity    Alcohol use: Yes    Drug use: No    Sexual activity: Not on file     Current Outpatient Medications on File Prior to Visit   Medication Sig    ezetimibe (ZETIA) 10 mg tablet TAKE 1 TABLET  "DAILY    rosuvastatin (CRESTOR) 10 MG tablet TAKE 1 TABLET DAILY    meloxicam (MOBIC) 15 mg tablet TAKE ONE TABLET BY MOUTH EVERY DAY (Patient not taking: Reported on 12/13/2024)     No Known Allergies  Immunization History   Administered Date(s) Administered    COVID-19 PFIZER VACCINE 0.3 ML IM 04/14/2021, 05/05/2021, 01/24/2022    COVID-19 Pfizer Vac BIVALENT Juan-sucrose 12 Yr+ IM 10/07/2022    COVID-19 Pfizer mRNA vacc PF juan-sucrose 12 yr and older (Comirnaty) 10/20/2023, 10/08/2024    INFLUENZA 12/09/2012, 09/02/2020, 09/16/2021    Influenza, injectable, quadrivalent, preservative free 0.5 mL 09/04/2020    Influenza, recombinant, quadrivalent,injectable, preservative free 10/13/2018    Influenza, seasonal, injectable 10/12/2015, 10/07/2017    Tdap 07/06/2017, 09/16/2021    Zoster Vaccine Recombinant 09/02/2020, 09/04/2020, 11/09/2020     Objective   /72   Pulse 92   Temp 98 °F (36.7 °C)   Resp 18   Ht 5' 10\" (1.778 m)   Wt 97.5 kg (215 lb)   SpO2 98%   BMI 30.85 kg/m²     Physical Exam  Vitals and nursing note reviewed.   Constitutional:       General: He is not in acute distress.     Appearance: Normal appearance. He is obese. He is not ill-appearing, toxic-appearing or diaphoretic.      Comments: Pleasant, obese 64-year-old male who is awake alert in no acute distress and oriented x 3   HENT:      Head: Normocephalic and atraumatic.      Right Ear: Tympanic membrane, ear canal and external ear normal. There is no impacted cerumen.      Left Ear: Tympanic membrane, ear canal and external ear normal. There is no impacted cerumen.      Nose: Nose normal. No congestion or rhinorrhea.      Mouth/Throat:      Mouth: Mucous membranes are moist.      Pharynx: Oropharynx is clear. No oropharyngeal exudate or posterior oropharyngeal erythema.   Eyes:      General: No scleral icterus.        Right eye: No discharge.         Left eye: No discharge.      Extraocular Movements: Extraocular movements intact. "      Conjunctiva/sclera: Conjunctivae normal.      Pupils: Pupils are equal, round, and reactive to light.   Neck:      Vascular: No carotid bruit.   Cardiovascular:      Rate and Rhythm: Normal rate and regular rhythm.      Pulses: Normal pulses.      Heart sounds: Normal heart sounds. No murmur heard.     No friction rub. No gallop.   Pulmonary:      Effort: Pulmonary effort is normal. No respiratory distress.      Breath sounds: Normal breath sounds. No stridor. No wheezing, rhonchi or rales.   Chest:      Chest wall: No tenderness.   Abdominal:      General: Abdomen is flat. Bowel sounds are normal. There is no distension.      Palpations: Abdomen is soft. There is no mass.      Tenderness: There is no abdominal tenderness. There is no right CVA tenderness, left CVA tenderness, guarding or rebound.      Hernia: No hernia is present.   Genitourinary:     Comments: Deferred exam as recent colonoscopy and digital rectal exam with colorectal physician yesterday  Musculoskeletal:         General: No swelling, tenderness, deformity or signs of injury.      Cervical back: Normal range of motion and neck supple. No rigidity or tenderness.      Right lower leg: No edema.      Left lower leg: No edema.      Comments: Adhesive capsulitis with decreased range of motion to his left shoulder girdle which she states is improved somewhat but still occasionally will cause some discomfort   Lymphadenopathy:      Cervical: No cervical adenopathy.   Skin:     General: Skin is warm and dry.      Capillary Refill: Capillary refill takes less than 2 seconds.      Coloration: Skin is not jaundiced or pale.      Findings: No bruising, erythema, lesion or rash.   Neurological:      General: No focal deficit present.      Mental Status: He is alert and oriented to person, place, and time. Mental status is at baseline.      Cranial Nerves: No cranial nerve deficit.      Sensory: No sensory deficit.      Motor: No weakness.       Coordination: Coordination normal.      Gait: Gait normal.      Deep Tendon Reflexes: Reflexes normal.   Psychiatric:         Mood and Affect: Mood normal.         Behavior: Behavior normal.         Thought Content: Thought content normal.         Judgment: Judgment normal.

## 2024-12-31 NOTE — ASSESSMENT & PLAN NOTE
Patient is on a combination of Crestor 10 mg daily along with Zetia.  Cholesterol is under excellent control.  I did tell the patient the importance as always watching his intake of fats and cholesterol with his diet.    Orders:    Lipid panel; Future

## 2025-01-13 ENCOUNTER — TELEPHONE (OUTPATIENT)
Age: 65
End: 2025-01-13

## 2025-01-22 DIAGNOSIS — E78.01 FAMILIAL HYPERCHOLESTEROLEMIA: ICD-10-CM

## 2025-01-23 RX ORDER — ROSUVASTATIN CALCIUM 10 MG/1
10 TABLET, COATED ORAL DAILY
Qty: 100 TABLET | Refills: 1 | Status: SHIPPED | OUTPATIENT
Start: 2025-01-23

## 2025-01-30 PROBLEM — Z00.00 HEALTHCARE MAINTENANCE: Status: RESOLVED | Noted: 2018-06-26 | Resolved: 2025-01-30

## 2025-03-12 DIAGNOSIS — E78.01 FAMILIAL HYPERCHOLESTEROLEMIA: ICD-10-CM

## 2025-03-13 RX ORDER — EZETIMIBE 10 MG/1
10 TABLET ORAL DAILY
Qty: 90 TABLET | Refills: 1 | Status: SHIPPED | OUTPATIENT
Start: 2025-03-13

## 2025-07-24 ENCOUNTER — APPOINTMENT (OUTPATIENT)
Dept: LAB | Facility: AMBULARY SURGERY CENTER | Age: 65
End: 2025-07-24
Payer: COMMERCIAL

## 2025-07-24 DIAGNOSIS — Z00.00 HEALTHCARE MAINTENANCE: ICD-10-CM

## 2025-07-24 DIAGNOSIS — E78.01 FAMILIAL HYPERCHOLESTEROLEMIA: ICD-10-CM

## 2025-07-24 DIAGNOSIS — Z12.5 PROSTATE CANCER SCREENING: ICD-10-CM

## 2025-07-24 LAB
CHOLEST SERPL-MCNC: 160 MG/DL (ref ?–200)
HDLC SERPL-MCNC: 45 MG/DL
LDLC SERPL CALC-MCNC: 89 MG/DL (ref 0–100)
NONHDLC SERPL-MCNC: 115 MG/DL
PSA SERPL-MCNC: 1.07 NG/ML (ref 0–4)
TRIGL SERPL-MCNC: 128 MG/DL (ref ?–150)

## 2025-07-24 PROCEDURE — G0103 PSA SCREENING: HCPCS

## 2025-07-24 PROCEDURE — 36415 COLL VENOUS BLD VENIPUNCTURE: CPT

## 2025-07-24 PROCEDURE — 80061 LIPID PANEL: CPT

## 2025-07-29 ENCOUNTER — OFFICE VISIT (OUTPATIENT)
Age: 65
End: 2025-07-29
Payer: COMMERCIAL

## 2025-07-29 VITALS
DIASTOLIC BLOOD PRESSURE: 86 MMHG | SYSTOLIC BLOOD PRESSURE: 118 MMHG | HEART RATE: 65 BPM | BODY MASS INDEX: 30.49 KG/M2 | HEIGHT: 70 IN | WEIGHT: 213 LBS | OXYGEN SATURATION: 98 %

## 2025-07-29 DIAGNOSIS — M54.41 CHRONIC RIGHT-SIDED LOW BACK PAIN WITH RIGHT-SIDED SCIATICA: ICD-10-CM

## 2025-07-29 DIAGNOSIS — H90.3 SENSORINEURAL HEARING LOSS (SNHL) OF BOTH EARS: ICD-10-CM

## 2025-07-29 DIAGNOSIS — E78.01 FAMILIAL HYPERCHOLESTEROLEMIA: ICD-10-CM

## 2025-07-29 DIAGNOSIS — Z12.5 SCREENING FOR PROSTATE CANCER: ICD-10-CM

## 2025-07-29 DIAGNOSIS — E66.09 CLASS 1 OBESITY DUE TO EXCESS CALORIES WITHOUT SERIOUS COMORBIDITY WITH BODY MASS INDEX (BMI) OF 30.0 TO 30.9 IN ADULT: ICD-10-CM

## 2025-07-29 DIAGNOSIS — Z00.00 HEALTHCARE MAINTENANCE: Primary | ICD-10-CM

## 2025-07-29 DIAGNOSIS — R53.83 FATIGUE, UNSPECIFIED TYPE: ICD-10-CM

## 2025-07-29 DIAGNOSIS — E66.811 CLASS 1 OBESITY DUE TO EXCESS CALORIES WITHOUT SERIOUS COMORBIDITY WITH BODY MASS INDEX (BMI) OF 30.0 TO 30.9 IN ADULT: ICD-10-CM

## 2025-07-29 DIAGNOSIS — G89.29 CHRONIC RIGHT-SIDED LOW BACK PAIN WITH RIGHT-SIDED SCIATICA: ICD-10-CM

## 2025-07-29 PROCEDURE — 99214 OFFICE O/P EST MOD 30 MIN: CPT | Performed by: INTERNAL MEDICINE

## 2025-07-30 RX ORDER — ROSUVASTATIN CALCIUM 10 MG/1
10 TABLET, COATED ORAL DAILY
Qty: 100 TABLET | Refills: 0 | Status: SHIPPED | OUTPATIENT
Start: 2025-07-30

## 2025-08-19 DIAGNOSIS — E78.01 FAMILIAL HYPERCHOLESTEROLEMIA: ICD-10-CM

## 2025-08-21 RX ORDER — ROSUVASTATIN CALCIUM 10 MG/1
10 TABLET, COATED ORAL DAILY
Qty: 90 TABLET | Refills: 1 | Status: SHIPPED | OUTPATIENT
Start: 2025-08-21

## 2025-08-21 RX ORDER — EZETIMIBE 10 MG/1
10 TABLET ORAL DAILY
Qty: 90 TABLET | Refills: 1 | Status: SHIPPED | OUTPATIENT
Start: 2025-08-21